# Patient Record
Sex: FEMALE | Race: ASIAN | Employment: UNEMPLOYED | URBAN - METROPOLITAN AREA
[De-identification: names, ages, dates, MRNs, and addresses within clinical notes are randomized per-mention and may not be internally consistent; named-entity substitution may affect disease eponyms.]

---

## 2017-01-13 RX ORDER — DESOGESTREL AND ETHINYL ESTRADIOL 0.15-0.03
KIT ORAL
Qty: 28 TABLET | Refills: 0 | Status: SHIPPED | OUTPATIENT
Start: 2017-01-13 | End: 2017-02-10 | Stop reason: SDUPTHER

## 2017-01-16 RX ORDER — B-COMPLEX WITH VITAMIN C
TABLET ORAL
Qty: 30 TABLET | Refills: 0 | Status: SHIPPED | OUTPATIENT
Start: 2017-01-16 | End: 2017-03-28 | Stop reason: SDUPTHER

## 2017-02-10 RX ORDER — DESOGESTREL AND ETHINYL ESTRADIOL 0.15-0.03
KIT ORAL
Qty: 28 TABLET | Refills: 0 | Status: SHIPPED | OUTPATIENT
Start: 2017-02-10 | End: 2017-03-28 | Stop reason: SDUPTHER

## 2017-03-28 ENCOUNTER — OFFICE VISIT (OUTPATIENT)
Dept: OBGYN CLINIC | Age: 37
End: 2017-03-28
Payer: MEDICARE

## 2017-03-28 VITALS
BODY MASS INDEX: 20.27 KG/M2 | HEIGHT: 63 IN | DIASTOLIC BLOOD PRESSURE: 84 MMHG | WEIGHT: 114.4 LBS | SYSTOLIC BLOOD PRESSURE: 108 MMHG

## 2017-03-28 DIAGNOSIS — Z01.419 ENCOUNTER FOR GYNECOLOGICAL EXAMINATION WITHOUT ABNORMAL FINDING: Primary | ICD-10-CM

## 2017-03-28 LAB — PAP SMEAR: NORMAL

## 2017-03-28 PROCEDURE — 99395 PREV VISIT EST AGE 18-39: CPT | Performed by: NURSE PRACTITIONER

## 2017-03-28 RX ORDER — DESOGESTREL AND ETHINYL ESTRADIOL 0.15-0.03
KIT ORAL
Qty: 28 TABLET | Refills: 12 | Status: SHIPPED | OUTPATIENT
Start: 2017-03-28 | End: 2019-05-28 | Stop reason: CLARIF

## 2017-03-28 ASSESSMENT — ENCOUNTER SYMPTOMS
ABDOMINAL PAIN: 0
COUGH: 0
SHORTNESS OF BREATH: 0
BACK PAIN: 0
CONSTIPATION: 0
ABDOMINAL DISTENTION: 0
DIARRHEA: 0

## 2017-04-04 DIAGNOSIS — Z01.419 ENCOUNTER FOR GYNECOLOGICAL EXAMINATION WITHOUT ABNORMAL FINDING: ICD-10-CM

## 2017-05-02 RX ORDER — B-COMPLEX WITH VITAMIN C
TABLET ORAL
Qty: 30 TABLET | Refills: 0 | Status: SHIPPED | OUTPATIENT
Start: 2017-05-02 | End: 2017-06-20 | Stop reason: SDUPTHER

## 2017-06-09 RX ORDER — B-COMPLEX WITH VITAMIN C
TABLET ORAL
Qty: 30 TABLET | Refills: 0 | Status: SHIPPED | OUTPATIENT
Start: 2017-06-09 | End: 2017-06-20 | Stop reason: SDUPTHER

## 2017-06-20 ENCOUNTER — OFFICE VISIT (OUTPATIENT)
Dept: FAMILY MEDICINE CLINIC | Age: 37
End: 2017-06-20
Payer: MEDICARE

## 2017-06-20 VITALS
HEIGHT: 62 IN | TEMPERATURE: 98 F | SYSTOLIC BLOOD PRESSURE: 125 MMHG | BODY MASS INDEX: 20.5 KG/M2 | HEART RATE: 86 BPM | DIASTOLIC BLOOD PRESSURE: 74 MMHG | WEIGHT: 111.4 LBS

## 2017-06-20 DIAGNOSIS — D69.6 THROMBOCYTOPENIA (HCC): ICD-10-CM

## 2017-06-20 DIAGNOSIS — R42 DIZZINESS: ICD-10-CM

## 2017-06-20 DIAGNOSIS — D50.9 IRON DEFICIENCY ANEMIA, UNSPECIFIED IRON DEFICIENCY ANEMIA TYPE: Primary | ICD-10-CM

## 2017-06-20 DIAGNOSIS — M25.541 ARTHRALGIA OF BOTH HANDS: ICD-10-CM

## 2017-06-20 DIAGNOSIS — H93.12 TINNITUS, LEFT: ICD-10-CM

## 2017-06-20 DIAGNOSIS — E55.9 VITAMIN D DEFICIENCY: ICD-10-CM

## 2017-06-20 DIAGNOSIS — R63.6 UNDERWEIGHT: ICD-10-CM

## 2017-06-20 DIAGNOSIS — M25.542 ARTHRALGIA OF BOTH HANDS: ICD-10-CM

## 2017-06-20 DIAGNOSIS — M54.9 DORSAL BACK PAIN: ICD-10-CM

## 2017-06-20 PROCEDURE — 99213 OFFICE O/P EST LOW 20 MIN: CPT | Performed by: FAMILY MEDICINE

## 2017-06-20 RX ORDER — LANOLIN ALCOHOL/MO/W.PET/CERES
325 CREAM (GRAM) TOPICAL 2 TIMES DAILY
Qty: 90 TABLET | Refills: 3 | Status: SHIPPED | OUTPATIENT
Start: 2017-06-20 | End: 2019-05-30 | Stop reason: SDUPTHER

## 2017-06-26 LAB
ALBUMIN SERPL-MCNC: 3.7 G/DL
ALP BLD-CCNC: 38 U/L
ALT SERPL-CCNC: 12 U/L
ANA TITER: NORMAL
AST SERPL-CCNC: 14 U/L
BASOPHILS ABSOLUTE: 0 /ΜL
BASOPHILS RELATIVE PERCENT: 0.4 %
BILIRUB SERPL-MCNC: 0.6 MG/DL (ref 0.1–1.4)
BUN BLDV-MCNC: 6 MG/DL
CALCIUM SERPL-MCNC: 9.1 MG/DL
CHLORIDE BLD-SCNC: 107 MMOL/L
CO2: 25 MMOL/L
CREAT SERPL-MCNC: 0.69 MG/DL
EOSINOPHILS ABSOLUTE: 0.2 /ΜL
EOSINOPHILS RELATIVE PERCENT: 3.4 %
GFR CALCULATED: 60
GLUCOSE BLD-MCNC: 79 MG/DL
HCT VFR BLD CALC: 41.3 % (ref 36–46)
HEMOGLOBIN: 14 G/DL (ref 12–16)
IRON: 65
LYMPHOCYTES ABSOLUTE: 1.7 /ΜL
LYMPHOCYTES RELATIVE PERCENT: 23.8 %
MCH RBC QN AUTO: 29.7 PG
MCHC RBC AUTO-ENTMCNC: 33.8 G/DL
MCV RBC AUTO: 88 FL
MONOCYTES ABSOLUTE: 0.4 /ΜL
MONOCYTES RELATIVE PERCENT: 6 %
NEUTROPHILS ABSOLUTE: 4.7 /ΜL
NEUTROPHILS RELATIVE PERCENT: 66.4 %
PDW BLD-RTO: 15.1 %
PLATELET # BLD: 145 K/ΜL
PMV BLD AUTO: 9.8 FL
POTASSIUM SERPL-SCNC: 4.1 MMOL/L
RBC # BLD: 4.7 10^6/ΜL
SODIUM BLD-SCNC: 138 MMOL/L
T4 FREE: 0.98
TOTAL IRON BINDING CAPACITY: 570
TOTAL PROTEIN: 6.6
TSH SERPL DL<=0.05 MIU/L-ACNC: 0.66 UIU/ML
URIC ACID: 3.6
VITAMIN D 25-HYDROXY: 29.9
VITAMIN D2, 25 HYDROXY: ABNORMAL
VITAMIN D3,25 HYDROXY: ABNORMAL
WBC # BLD: 7.1 10^3/ML

## 2017-06-29 DIAGNOSIS — M25.541 ARTHRALGIA OF BOTH HANDS: ICD-10-CM

## 2017-06-29 DIAGNOSIS — E55.9 VITAMIN D DEFICIENCY: ICD-10-CM

## 2017-06-29 DIAGNOSIS — D50.9 IRON DEFICIENCY ANEMIA, UNSPECIFIED IRON DEFICIENCY ANEMIA TYPE: ICD-10-CM

## 2017-06-29 DIAGNOSIS — R63.6 UNDERWEIGHT: ICD-10-CM

## 2017-06-29 DIAGNOSIS — M25.542 ARTHRALGIA OF BOTH HANDS: ICD-10-CM

## 2017-07-11 RX ORDER — B-COMPLEX WITH VITAMIN C
TABLET ORAL
Qty: 30 TABLET | Refills: 0 | Status: SHIPPED | OUTPATIENT
Start: 2017-07-11 | End: 2017-07-24 | Stop reason: SDUPTHER

## 2017-07-14 ASSESSMENT — ENCOUNTER SYMPTOMS
DIARRHEA: 0
TROUBLE SWALLOWING: 0
NAUSEA: 0
SORE THROAT: 0
CONSTIPATION: 0
RHINORRHEA: 0
COUGH: 0
ABDOMINAL PAIN: 0
BACK PAIN: 1
SHORTNESS OF BREATH: 0
CHEST TIGHTNESS: 0

## 2017-07-24 ENCOUNTER — OFFICE VISIT (OUTPATIENT)
Dept: FAMILY MEDICINE CLINIC | Age: 37
End: 2017-07-24
Payer: MEDICARE

## 2017-07-24 VITALS
HEIGHT: 62 IN | DIASTOLIC BLOOD PRESSURE: 89 MMHG | SYSTOLIC BLOOD PRESSURE: 123 MMHG | BODY MASS INDEX: 20.61 KG/M2 | HEART RATE: 92 BPM | WEIGHT: 112 LBS

## 2017-07-24 DIAGNOSIS — E55.9 VITAMIN D DEFICIENCY: ICD-10-CM

## 2017-07-24 DIAGNOSIS — D69.6 THROMBOCYTOPENIA (HCC): ICD-10-CM

## 2017-07-24 DIAGNOSIS — R53.83 FATIGUE, UNSPECIFIED TYPE: ICD-10-CM

## 2017-07-24 DIAGNOSIS — D50.9 IRON DEFICIENCY ANEMIA, UNSPECIFIED IRON DEFICIENCY ANEMIA TYPE: Primary | ICD-10-CM

## 2017-07-24 DIAGNOSIS — R42 DIZZINESS: ICD-10-CM

## 2017-07-24 PROCEDURE — 99213 OFFICE O/P EST LOW 20 MIN: CPT | Performed by: FAMILY MEDICINE

## 2017-07-24 RX ORDER — KETOTIFEN FUMARATE 0.25 MG/ML
SOLUTION OPHTHALMIC
COMMUNITY
Start: 2017-06-27 | End: 2020-12-22 | Stop reason: SDUPTHER

## 2017-07-24 RX ORDER — FEXOFENADINE HCL 180 MG/1
TABLET ORAL
COMMUNITY
Start: 2017-06-27 | End: 2017-09-20 | Stop reason: ALTCHOICE

## 2017-07-24 RX ORDER — FLUTICASONE PROPIONATE 50 MCG
SPRAY, SUSPENSION (ML) NASAL PRN
COMMUNITY
Start: 2017-06-27 | End: 2020-08-20

## 2017-07-24 ASSESSMENT — ENCOUNTER SYMPTOMS
NAUSEA: 0
TROUBLE SWALLOWING: 0
CONSTIPATION: 0
SHORTNESS OF BREATH: 0
DIARRHEA: 0
ABDOMINAL PAIN: 0
COUGH: 0
CHEST TIGHTNESS: 0
BACK PAIN: 1
SORE THROAT: 0
RHINORRHEA: 0

## 2017-09-11 RX ORDER — B-COMPLEX WITH VITAMIN C
TABLET ORAL
Qty: 30 TABLET | Refills: 0 | Status: SHIPPED | OUTPATIENT
Start: 2017-09-11 | End: 2017-09-20 | Stop reason: SDUPTHER

## 2017-09-20 ENCOUNTER — OFFICE VISIT (OUTPATIENT)
Dept: FAMILY MEDICINE CLINIC | Age: 37
End: 2017-09-20
Payer: MEDICARE

## 2017-09-20 VITALS
DIASTOLIC BLOOD PRESSURE: 70 MMHG | HEART RATE: 81 BPM | HEIGHT: 62 IN | TEMPERATURE: 98.1 F | BODY MASS INDEX: 20.82 KG/M2 | WEIGHT: 113.13 LBS | SYSTOLIC BLOOD PRESSURE: 117 MMHG | OXYGEN SATURATION: 99 %

## 2017-09-20 DIAGNOSIS — M54.9 DORSAL BACK PAIN: ICD-10-CM

## 2017-09-20 DIAGNOSIS — R42 DIZZINESS: Primary | ICD-10-CM

## 2017-09-20 DIAGNOSIS — R53.83 FATIGUE, UNSPECIFIED TYPE: ICD-10-CM

## 2017-09-20 DIAGNOSIS — E55.9 VITAMIN D DEFICIENCY: ICD-10-CM

## 2017-09-20 PROCEDURE — 99213 OFFICE O/P EST LOW 20 MIN: CPT | Performed by: FAMILY MEDICINE

## 2017-09-20 ASSESSMENT — ENCOUNTER SYMPTOMS
COUGH: 0
CONSTIPATION: 0
SORE THROAT: 0
TROUBLE SWALLOWING: 0
NAUSEA: 0
DIARRHEA: 0
RHINORRHEA: 0
ABDOMINAL PAIN: 0
CHEST TIGHTNESS: 0
SHORTNESS OF BREATH: 1
BACK PAIN: 1

## 2018-01-31 ENCOUNTER — OFFICE VISIT (OUTPATIENT)
Dept: FAMILY MEDICINE CLINIC | Age: 38
End: 2018-01-31
Payer: MEDICARE

## 2018-01-31 VITALS
DIASTOLIC BLOOD PRESSURE: 77 MMHG | SYSTOLIC BLOOD PRESSURE: 112 MMHG | BODY MASS INDEX: 20.87 KG/M2 | HEART RATE: 82 BPM | HEIGHT: 62 IN | WEIGHT: 113.4 LBS | OXYGEN SATURATION: 100 %

## 2018-01-31 DIAGNOSIS — K06.8 GINGIVAL BLEEDING: Primary | ICD-10-CM

## 2018-01-31 DIAGNOSIS — D69.6 THROMBOCYTOPENIA (HCC): ICD-10-CM

## 2018-01-31 DIAGNOSIS — E55.9 VITAMIN D DEFICIENCY: ICD-10-CM

## 2018-01-31 PROCEDURE — 1036F TOBACCO NON-USER: CPT | Performed by: FAMILY MEDICINE

## 2018-01-31 PROCEDURE — 99213 OFFICE O/P EST LOW 20 MIN: CPT | Performed by: FAMILY MEDICINE

## 2018-01-31 PROCEDURE — G8427 DOCREV CUR MEDS BY ELIG CLIN: HCPCS | Performed by: FAMILY MEDICINE

## 2018-01-31 PROCEDURE — G8420 CALC BMI NORM PARAMETERS: HCPCS | Performed by: FAMILY MEDICINE

## 2018-01-31 PROCEDURE — G8484 FLU IMMUNIZE NO ADMIN: HCPCS | Performed by: FAMILY MEDICINE

## 2018-01-31 ASSESSMENT — ENCOUNTER SYMPTOMS
COUGH: 0
NAUSEA: 0
RHINORRHEA: 0
CONSTIPATION: 0
SHORTNESS OF BREATH: 0
TROUBLE SWALLOWING: 0
ABDOMINAL PAIN: 0
CHEST TIGHTNESS: 0
SORE THROAT: 0
DIARRHEA: 0
BACK PAIN: 1

## 2018-01-31 NOTE — PROGRESS NOTES
Visit Information    Have you changed or started any medications since your last visit including any over-the-counter medicines, vitamins, or herbal medicines? no   Are you having any side effects from any of your medications? -  yes - Enskyce patient states some rash on her face   Have you stopped taking any of your medications? Is so, why? -  no    Have you seen any other physician or provider since your last visit? No  Have you had any other diagnostic tests since your last visit? No  Have you been seen in the emergency room and/or had an admission to a hospital since we last saw you? No  Have you had your routine dental cleaning in the past 6 months? yes - January     Have you activated your [employer] account? If not, what are your barriers?  Yes     Patient Care Team:  Ron Brownlee MD as PCP - General (Family Medicine)    Medical History Review  Past Medical, Family, and Social History reviewed and does not contribute to the patient presenting condition    Health Maintenance   Topic Date Due    DTaP/Tdap/Td vaccine (1 - Tdap) 05/14/1997    Flu vaccine (1) 09/01/2017    Cervical cancer screen  03/28/2018    HIV screen  Completed

## 2018-04-05 ENCOUNTER — HOSPITAL ENCOUNTER (OUTPATIENT)
Age: 38
Setting detail: SPECIMEN
Discharge: HOME | End: 2018-04-05
Payer: MEDICARE

## 2018-04-05 ENCOUNTER — OFFICE VISIT (OUTPATIENT)
Dept: OBGYN CLINIC | Age: 38
End: 2018-04-05
Payer: MEDICARE

## 2018-04-05 VITALS
DIASTOLIC BLOOD PRESSURE: 78 MMHG | BODY MASS INDEX: 19.77 KG/M2 | HEIGHT: 63 IN | WEIGHT: 111.6 LBS | SYSTOLIC BLOOD PRESSURE: 118 MMHG

## 2018-04-05 DIAGNOSIS — Z01.419 ENCOUNTER FOR GYNECOLOGICAL EXAMINATION WITHOUT ABNORMAL FINDING: Primary | ICD-10-CM

## 2018-04-05 DIAGNOSIS — L68.0 FEMALE HIRSUTISM: ICD-10-CM

## 2018-04-05 DIAGNOSIS — Z12.31 ENCOUNTER FOR SCREENING MAMMOGRAM FOR BREAST CANCER: ICD-10-CM

## 2018-04-05 DIAGNOSIS — N93.9 ABNORMAL UTERINE BLEEDING (AUB): ICD-10-CM

## 2018-04-05 PROCEDURE — 99395 PREV VISIT EST AGE 18-39: CPT | Performed by: NURSE PRACTITIONER

## 2018-04-05 ASSESSMENT — ENCOUNTER SYMPTOMS
COUGH: 0
DIARRHEA: 0
ABDOMINAL DISTENTION: 0
ABDOMINAL PAIN: 0
SHORTNESS OF BREATH: 0
BACK PAIN: 0
CONSTIPATION: 0

## 2018-04-09 LAB
AVERAGE GLUCOSE: 97
BASOPHILS ABSOLUTE: 0 /ΜL
BASOPHILS RELATIVE PERCENT: 0.2 %
CORTISOL: 8.4
EOSINOPHILS ABSOLUTE: 0.1 /ΜL
EOSINOPHILS RELATIVE PERCENT: 1.7 %
HBA1C MFR BLD: 5 %
HCT VFR BLD CALC: 40.1 % (ref 36–46)
HEMOGLOBIN: 13.9 G/DL (ref 12–16)
INR BLD: 1.1
LYMPHOCYTES ABSOLUTE: 2.1 /ΜL
LYMPHOCYTES RELATIVE PERCENT: 24.4 %
MCH RBC QN AUTO: 31 PG
MCHC RBC AUTO-ENTMCNC: 34.7 G/DL
MCV RBC AUTO: 89 FL
MONOCYTES ABSOLUTE: 0.5 /ΜL
MONOCYTES RELATIVE PERCENT: 6 %
NEUTROPHILS ABSOLUTE: 5.8 /ΜL
NEUTROPHILS RELATIVE PERCENT: 67.7 %
PDW BLD-RTO: 13.5 %
PLATELET # BLD: 140 K/ΜL
PMV BLD AUTO: 9.9 FL
PROTIME: 12.2 SECONDS
RBC # BLD: 4.49 10^6/ΜL
WBC # BLD: 8.6 10^3/ML

## 2018-04-10 DIAGNOSIS — L68.0 FEMALE HIRSUTISM: ICD-10-CM

## 2018-04-10 DIAGNOSIS — K06.8 GINGIVAL BLEEDING: ICD-10-CM

## 2018-04-13 ENCOUNTER — PROCEDURE VISIT (OUTPATIENT)
Dept: OBGYN CLINIC | Age: 38
End: 2018-04-13
Payer: MEDICARE

## 2018-04-13 DIAGNOSIS — N93.9 ABNORMAL UTERINE BLEEDING (AUB): ICD-10-CM

## 2018-04-13 DIAGNOSIS — L68.0 FEMALE HIRSUTISM: ICD-10-CM

## 2018-04-13 LAB
SEX HORMONE BINDING GLOBULIN: NORMAL
TESTOSTERONE FREE: 0.33
TESTOSTERONE, FEMALES/CHILDREN: 22

## 2018-04-13 PROCEDURE — 76830 TRANSVAGINAL US NON-OB: CPT | Performed by: OBSTETRICS & GYNECOLOGY

## 2018-04-13 PROCEDURE — 76856 US EXAM PELVIC COMPLETE: CPT | Performed by: OBSTETRICS & GYNECOLOGY

## 2018-04-19 ENCOUNTER — OFFICE VISIT (OUTPATIENT)
Dept: FAMILY MEDICINE CLINIC | Age: 38
End: 2018-04-19
Payer: MEDICARE

## 2018-04-19 VITALS
WEIGHT: 109.8 LBS | HEIGHT: 63 IN | DIASTOLIC BLOOD PRESSURE: 74 MMHG | BODY MASS INDEX: 19.45 KG/M2 | SYSTOLIC BLOOD PRESSURE: 114 MMHG | HEART RATE: 77 BPM

## 2018-04-19 DIAGNOSIS — E55.9 VITAMIN D DEFICIENCY: ICD-10-CM

## 2018-04-19 DIAGNOSIS — M54.9 DORSAL BACK PAIN: ICD-10-CM

## 2018-04-19 DIAGNOSIS — D69.6 THROMBOCYTOPENIA (HCC): ICD-10-CM

## 2018-04-19 DIAGNOSIS — K21.9 GASTROESOPHAGEAL REFLUX DISEASE WITHOUT ESOPHAGITIS: ICD-10-CM

## 2018-04-19 DIAGNOSIS — N93.8 DUB (DYSFUNCTIONAL UTERINE BLEEDING): Primary | ICD-10-CM

## 2018-04-19 DIAGNOSIS — D25.9 UTERINE LEIOMYOMA, UNSPECIFIED LOCATION: ICD-10-CM

## 2018-04-19 PROBLEM — H81.8X9 UNILATERAL VESTIBULAR WEAKNESS: Status: ACTIVE | Noted: 2017-06-15

## 2018-04-19 PROBLEM — H81.4 CENTRAL POSITIONAL VERTIGO: Status: ACTIVE | Noted: 2017-06-15

## 2018-04-19 PROCEDURE — G8427 DOCREV CUR MEDS BY ELIG CLIN: HCPCS | Performed by: FAMILY MEDICINE

## 2018-04-19 PROCEDURE — G8420 CALC BMI NORM PARAMETERS: HCPCS | Performed by: FAMILY MEDICINE

## 2018-04-19 PROCEDURE — 99214 OFFICE O/P EST MOD 30 MIN: CPT | Performed by: FAMILY MEDICINE

## 2018-04-19 PROCEDURE — 1036F TOBACCO NON-USER: CPT | Performed by: FAMILY MEDICINE

## 2018-04-19 RX ORDER — OMEPRAZOLE 20 MG/1
20 CAPSULE, DELAYED RELEASE ORAL DAILY
Qty: 30 CAPSULE | Refills: 3 | Status: ON HOLD | OUTPATIENT
Start: 2018-04-19 | End: 2022-05-16 | Stop reason: ALTCHOICE

## 2018-04-19 ASSESSMENT — ENCOUNTER SYMPTOMS
TROUBLE SWALLOWING: 0
BACK PAIN: 1
ABDOMINAL PAIN: 0
DIARRHEA: 0
COUGH: 1
SORE THROAT: 0
CHEST TIGHTNESS: 0
NAUSEA: 0
CONSTIPATION: 0
FACIAL SWELLING: 0
RHINORRHEA: 0
SHORTNESS OF BREATH: 0

## 2018-04-19 ASSESSMENT — PATIENT HEALTH QUESTIONNAIRE - PHQ9
1. LITTLE INTEREST OR PLEASURE IN DOING THINGS: 0
SUM OF ALL RESPONSES TO PHQ QUESTIONS 1-9: 0
SUM OF ALL RESPONSES TO PHQ9 QUESTIONS 1 & 2: 0
2. FEELING DOWN, DEPRESSED OR HOPELESS: 0

## 2018-04-20 LAB — CYTOLOGY REPORT: NORMAL

## 2018-05-09 ENCOUNTER — INITIAL CONSULT (OUTPATIENT)
Dept: OBGYN CLINIC | Age: 38
End: 2018-05-09
Payer: MEDICARE

## 2018-05-09 VITALS
SYSTOLIC BLOOD PRESSURE: 116 MMHG | DIASTOLIC BLOOD PRESSURE: 70 MMHG | HEIGHT: 62 IN | WEIGHT: 109.8 LBS | BODY MASS INDEX: 20.2 KG/M2

## 2018-05-09 DIAGNOSIS — N92.1 MENOMETRORRHAGIA: Primary | ICD-10-CM

## 2018-05-09 PROCEDURE — 1036F TOBACCO NON-USER: CPT | Performed by: OBSTETRICS & GYNECOLOGY

## 2018-05-09 PROCEDURE — G8428 CUR MEDS NOT DOCUMENT: HCPCS | Performed by: OBSTETRICS & GYNECOLOGY

## 2018-05-09 PROCEDURE — 99213 OFFICE O/P EST LOW 20 MIN: CPT | Performed by: OBSTETRICS & GYNECOLOGY

## 2018-05-09 PROCEDURE — G8420 CALC BMI NORM PARAMETERS: HCPCS | Performed by: OBSTETRICS & GYNECOLOGY

## 2018-06-11 ENCOUNTER — OFFICE VISIT (OUTPATIENT)
Dept: OBGYN CLINIC | Age: 38
End: 2018-06-11
Payer: MEDICARE

## 2018-06-11 VITALS
BODY MASS INDEX: 20.17 KG/M2 | WEIGHT: 109.6 LBS | DIASTOLIC BLOOD PRESSURE: 70 MMHG | HEIGHT: 62 IN | SYSTOLIC BLOOD PRESSURE: 114 MMHG

## 2018-06-11 DIAGNOSIS — N94.6 MENORRHALGIA: Primary | ICD-10-CM

## 2018-06-11 DIAGNOSIS — D25.0 SUBMUCOUS LEIOMYOMA OF UTERUS: ICD-10-CM

## 2018-06-11 DIAGNOSIS — N84.0 UTERINE POLYP: ICD-10-CM

## 2018-06-11 PROCEDURE — G8420 CALC BMI NORM PARAMETERS: HCPCS | Performed by: OBSTETRICS & GYNECOLOGY

## 2018-06-11 PROCEDURE — G8428 CUR MEDS NOT DOCUMENT: HCPCS | Performed by: OBSTETRICS & GYNECOLOGY

## 2018-06-11 PROCEDURE — 1036F TOBACCO NON-USER: CPT | Performed by: OBSTETRICS & GYNECOLOGY

## 2018-06-11 PROCEDURE — 99213 OFFICE O/P EST LOW 20 MIN: CPT | Performed by: OBSTETRICS & GYNECOLOGY

## 2018-07-31 ENCOUNTER — OFFICE VISIT (OUTPATIENT)
Dept: FAMILY MEDICINE CLINIC | Age: 38
End: 2018-07-31
Payer: MEDICARE

## 2018-07-31 VITALS
SYSTOLIC BLOOD PRESSURE: 109 MMHG | BODY MASS INDEX: 20.13 KG/M2 | RESPIRATION RATE: 16 BRPM | WEIGHT: 109.4 LBS | HEART RATE: 85 BPM | DIASTOLIC BLOOD PRESSURE: 69 MMHG | HEIGHT: 62 IN | OXYGEN SATURATION: 99 %

## 2018-07-31 DIAGNOSIS — E55.9 VITAMIN D DEFICIENCY: ICD-10-CM

## 2018-07-31 DIAGNOSIS — N93.8 DUB (DYSFUNCTIONAL UTERINE BLEEDING): ICD-10-CM

## 2018-07-31 DIAGNOSIS — M54.9 DORSAL BACK PAIN: Primary | ICD-10-CM

## 2018-07-31 DIAGNOSIS — D50.9 IRON DEFICIENCY ANEMIA, UNSPECIFIED IRON DEFICIENCY ANEMIA TYPE: ICD-10-CM

## 2018-07-31 DIAGNOSIS — D69.6 THROMBOCYTOPENIA (HCC): ICD-10-CM

## 2018-07-31 PROCEDURE — G8427 DOCREV CUR MEDS BY ELIG CLIN: HCPCS | Performed by: FAMILY MEDICINE

## 2018-07-31 PROCEDURE — 1036F TOBACCO NON-USER: CPT | Performed by: FAMILY MEDICINE

## 2018-07-31 PROCEDURE — 99213 OFFICE O/P EST LOW 20 MIN: CPT | Performed by: FAMILY MEDICINE

## 2018-07-31 PROCEDURE — G8420 CALC BMI NORM PARAMETERS: HCPCS | Performed by: FAMILY MEDICINE

## 2018-07-31 ASSESSMENT — ENCOUNTER SYMPTOMS
BACK PAIN: 0
ABDOMINAL PAIN: 0
COUGH: 0
SHORTNESS OF BREATH: 0
TROUBLE SWALLOWING: 0
CHEST TIGHTNESS: 0
DIARRHEA: 0
RHINORRHEA: 0
NAUSEA: 0
SORE THROAT: 0
CONSTIPATION: 0

## 2018-07-31 NOTE — PROGRESS NOTES
Visit Information    Have you changed or started any medications since your last visit including any over-the-counter medicines, vitamins, or herbal medicines? no   Have you stopped taking any of your medications? Is so, why? -  yes - birth control meds   Are you having any side effects from any of your medications? - yes - she felt she was with bc and that's why she stopped     Have you seen any other physician or provider since your last visit? yes - obgyn    Have you had any other diagnostic tests since your last visit?  no   Have you been seen in the emergency room and/or had an admission in a hospital since we last saw you?  no   Have you had your routine dental cleaning in the past 6 months?  no     Do you have an active MyChart account? If no, what is the barrier?   Yes    Patient Care Team:  Baldev Badillo MD as PCP - General (Family Medicine)  Baldev Badillo MD as PCP - [facility] Attributed Provider    Medical History Review  Past Medical, Family, and Social History reviewed and does contribute to the patient presenting condition    Health Maintenance   Topic Date Due    Lipid screen  05/14/2018    DTaP/Tdap/Td vaccine (1 - Tdap) 04/19/2019 (Originally 5/14/1997)    Flu vaccine (1) 09/01/2018    Cervical cancer screen  04/05/2019    HIV screen  Completed

## 2018-07-31 NOTE — PROGRESS NOTES
rhinorrhea, sore throat and trouble swallowing. Eyes: Negative for visual disturbance. Respiratory: Negative for cough, chest tightness and shortness of breath. Cardiovascular: Negative for chest pain, palpitations and leg swelling. Gastrointestinal: Negative for abdominal pain, constipation, diarrhea and nausea. Endocrine: Negative for polydipsia and polyuria. Genitourinary: Negative for difficulty urinating, dysuria and hematuria. Musculoskeletal: Negative for arthralgias, back pain, gait problem and neck pain. Skin: Negative for rash. Neurological: Negative for dizziness, light-headedness and headaches. Psychiatric/Behavioral: Negative for dysphoric mood and sleep disturbance. The patient is not nervous/anxious. Objective:     Physical Exam   Constitutional: She is oriented to person, place, and time. Vital signs are normal. She appears well-developed and well-nourished. HENT:   Head: Normocephalic. Nose: Nose normal.   Mouth/Throat: Oropharynx is clear and moist.   Eyes: Pupils are equal, round, and reactive to light. No scleral icterus. Neck: Normal range of motion. Neck supple. No JVD present. Neck shows no nodes or bruits. Thyroid is not palpable. Cardiovascular: Normal rate, regular rhythm, normal heart sounds and normal pulses. Exam reveals no gallop. No murmur heard. Pulmonary/Chest: Effort normal and breath sounds normal. She has no wheezes. She has no rales. Abdominal: Soft. Bowel sounds are normal. She exhibits no mass. There is no tenderness. Liver and spleen are not palpable. Musculoskeletal:   Legs show no edema. She moves all her limbs well. Feet show no tenderness. They're warm to touch. Pulses are palpable. She is able to stand up straight. There is mild tenderness over the trapezius muscles and dorsal muscles. No back deformity noted. No tremors. Gait is stable. Lymphadenopathy:     She has no cervical adenopathy.    Neurological: She is alert and oriented to person, place, and time. Coordination normal.   Skin: Skin is warm and dry. No rash noted. Psychiatric: She has a normal mood and affect. Her behavior is normal. Thought content normal.   Nursing note and vitals reviewed. /69 (Site: Left Arm, Position: Sitting, Cuff Size: Medium Adult)   Pulse 85   Resp 16   Ht 5' 2\" (1.575 m)   Wt 109 lb 6.4 oz (49.6 kg)   SpO2 99%   BMI 20.01 kg/m²     Assessment:       Diagnosis Orders   1. Dorsal back pain     2. Vitamin D deficiency  Comprehensive Metabolic Panel    Vitamin D 1,25 Dihydroxy    Vitamin D 25 Hydroxy   3. Thrombocytopenia (HCC)  CBC Auto Differential    Comprehensive Metabolic Panel   4. DUB (dysfunctional uterine bleeding)     5. Iron deficiency anemia, unspecified iron deficiency anemia type  Iron and TIBC       Plan:      Return in about 3 months (around 10/31/2018). Orders Placed This Encounter   Procedures    CBC Auto Differential     Standing Status:   Future     Standing Expiration Date:   7/31/2019    Comprehensive Metabolic Panel     Standing Status:   Future     Standing Expiration Date:   7/31/2019    Iron and TIBC     Standing Status:   Future     Standing Expiration Date:   7/31/2019     Order Specific Question:   Is Patient Fasting? Answer:   yes     Order Specific Question:   No of Hours? Answer:   8    Vitamin D 1,25 Dihydroxy     Standing Status:   Future     Standing Expiration Date:   7/31/2019    Vitamin D 25 Hydroxy     Standing Status:   Future     Standing Expiration Date:   7/31/2019     No orders of the defined types were placed in this encounter. Findings and/or pathophysiology discussed with patient. Plan of treatment discussed. Patient's medical problems were discussed with her. Findings were explained. Chart was evaluated. Available lab work and tests results were discussed. Her medications were reviewed. Health maintenance was discussed.   Weight management was

## 2019-05-28 ENCOUNTER — OFFICE VISIT (OUTPATIENT)
Dept: OBGYN CLINIC | Age: 39
End: 2019-05-28
Payer: MEDICARE

## 2019-05-28 ENCOUNTER — HOSPITAL ENCOUNTER (OUTPATIENT)
Age: 39
Setting detail: SPECIMEN
Discharge: HOME | End: 2019-05-28
Payer: MEDICARE

## 2019-05-28 VITALS
BODY MASS INDEX: 18.5 KG/M2 | DIASTOLIC BLOOD PRESSURE: 66 MMHG | SYSTOLIC BLOOD PRESSURE: 100 MMHG | WEIGHT: 104.4 LBS | HEIGHT: 63 IN

## 2019-05-28 DIAGNOSIS — Z01.419 ENCOUNTER FOR GYNECOLOGICAL EXAMINATION WITH PAPANICOLAOU SMEAR OF CERVIX: ICD-10-CM

## 2019-05-28 DIAGNOSIS — N92.1 MENORRHAGIA WITH IRREGULAR CYCLE: ICD-10-CM

## 2019-05-28 DIAGNOSIS — Z00.00 ENCOUNTER FOR WELL WOMAN EXAM WITHOUT GYNECOLOGICAL EXAM: Primary | ICD-10-CM

## 2019-05-28 PROCEDURE — 99396 PREV VISIT EST AGE 40-64: CPT | Performed by: NURSE PRACTITIONER

## 2019-05-28 ASSESSMENT — ENCOUNTER SYMPTOMS
ABDOMINAL DISTENTION: 0
BACK PAIN: 0
COUGH: 0
CONSTIPATION: 0
ABDOMINAL PAIN: 0
DIARRHEA: 0
SHORTNESS OF BREATH: 0

## 2019-05-28 NOTE — PROGRESS NOTES
HPI:     Coleman Vera a 37 y.o. female who presents today for:  Chief Complaint   Patient presents with    Annual Exam     presents for annual exam, last pap 04/05/18 normal, no mamm's    Vaginal Bleeding     episodes of bleeding twice per month    Bleeding/Bruising     bruising and unexplained weight lose       HPI  Here for annual exam.  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 Has 3 children- 15/10/7. Eats very healthy, working on adding exercise. GYNECOLOGICHISTORY:  MenstrualHistory: Patient's last menstrual period was 05/01/2019. Sexually Transmitted Infections: No  History of Ectopic Pregnancy:No  Menarche: 13  Cycles states tat she is having a period twice a month  Had US last year- fibroid noted and discussed with Dr Phil Chin. He recommended hysteroscopy and she did not pursue at that time  Durationof cycles: 2/7  Dysmenorrhea: mild  Sexually active: not currently  Dyspareunia:    Current Outpatient Medications   Medication Sig Dispense Refill    omeprazole (PRILOSEC) 20 MG delayed release capsule Take 1 capsule by mouth daily 30 capsule 3    EQ EYE ITCH RELIEF 0.025 % ophthalmic solution       fluticasone (FLONASE) 50 MCG/ACT nasal spray       ferrous sulfate (FE TABS) 325 (65 Fe) MG EC tablet Take 1 tablet by mouth 2 times daily (Patient taking differently: Take 325 mg by mouth daily as needed ) 90 tablet 3    Calcium Carbonate-Vitamin D (OYSTER SHELL CALCIUM/D) 500-200 MG-UNIT TABS TAKE ONE TABLET BY MOUTH ONCE DAILY 30 tablet 0    ibuprofen (ADVIL;MOTRIN) 400 MG tablet Take 1 tablet by mouth every 8 hours as needed for Pain 90 tablet 1    fluocinolone acetonide (SYNALAR) 0.01 % external solution        No current facility-administered medications for this visit.       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[facility])      Denies family history of breast, ovarian, uterus, colon CA     Past Surgical History:   Procedure Laterality Date     SECTION, LOW TRANSVERSE  2004     SECTION, LOW TRANSVERSE  2008     SECTION, LOW TRANSVERSE  10/2012    TUBAL LIGATION  10/8/12     Family History   Problem Relation Age of Onset    High Blood Pressure Mother     High Blood Pressure Father      Social History     Tobacco Use    Smoking status: Never Smoker    Smokeless tobacco: Never Used   Substance Use Topics    Alcohol use: No        Subjective:      Review of Systems   Constitutional: Positive for unexpected weight change (weight loss). Negative for appetite change and fatigue. HENT: Negative for congestion and hearing loss. Eyes: Negative for visual disturbance. Respiratory: Negative for cough and shortness of breath. Cardiovascular: Negative for chest pain and palpitations. Gastrointestinal: Negative for abdominal distention, abdominal pain, constipation and diarrhea. Genitourinary: Positive for menstrual problem (menorrhagia). Negative for flank pain, frequency, pelvic pain and vaginal discharge. Musculoskeletal: Negative for back pain. Neurological: Negative for syncope and headaches. Hematological: Bruises/bleeds easily. Psychiatric/Behavioral: Negative for behavioral problems. Objective:     /66   Ht 5' 2.75\" (1.594 m)   Wt 104 lb 6.4 oz (47.4 kg)   LMP 2019 Comment: twice per month  Breastfeeding? No   BMI 18.64 kg/m²   Physical Exam   Constitutional: She is oriented to person, place, and time. She appears well-developed and well-nourished. Eyes: Pupils are equal, round, and reactive to light. Neck: No tracheal deviation present. No thyromegaly present. Cardiovascular: Normal rate, regular rhythm and normal heart sounds. Pulmonary/Chest: Effort normal and breath sounds normal. No respiratory distress. Right breast exhibits no inverted nipple. Left breast exhibits no inverted nipple, no mass, no nipple discharge, no skin change and no tenderness.  No breast tenderness, discharge or bleeding. Breasts are symmetrical.   Abdominal: Soft. Bowel sounds are normal. She exhibits no distension and no mass. There is no tenderness. There is no CVA tenderness. Hernia confirmed negative in the right inguinal area and confirmed negative in the left inguinal area. Genitourinary: No breast tenderness, discharge or bleeding. There is no rash or lesion on the right labia. There is no rash or lesion on the left labia. Uterus is not deviated, not enlarged and not fixed. Cervix exhibits no motion tenderness, no discharge and no friability. Right adnexum displays no mass, no tenderness and no fullness. Left adnexum displays no mass, no tenderness and no fullness. No tenderness in the vagina. No vaginal discharge found. Musculoskeletal: She exhibits no edema or tenderness. Lymphadenopathy:     She has no cervical adenopathy. Right: No inguinal adenopathy present. Left: No inguinal adenopathy present. Neurological: She is alert and oriented to person, place, and time. Skin: Skin is warm and dry. Psychiatric: She has a normal mood and affect. Her behavior is normal. Judgment and thought content normal.         Assessment:     1. Encounter for well woman exam without gynecological exam    2. Menorrhagia with irregular cycle          Plan:   1. Pap collected. Discussed new pap smear guidelines. Desires re-pap in 1 year. 2. Screening mammogram discussed and advised yearly if within normal limits. 3. Calcium and Vitamin D dosing reviewed. 4. Colonoscopy screening reviewed. 5. Bone density testing reviewed. 6. Pelvic US  7.  Advised FU w/PCP for well exam and bruising problems, unintended weight loss  Electronicallysigned by Christina Espana on 5/28/2019

## 2019-05-30 DIAGNOSIS — D50.9 IRON DEFICIENCY ANEMIA, UNSPECIFIED IRON DEFICIENCY ANEMIA TYPE: ICD-10-CM

## 2019-05-30 NOTE — TELEPHONE ENCOUNTER
Health Maintenance   Topic Date Due    DTaP/Tdap/Td vaccine (1 - Tdap) 05/14/1997    Lipid screen  05/14/2018    Cervical cancer screen  04/05/2019    Flu vaccine (Season Ended) 09/01/2019    HIV screen  Completed    Pneumococcal 0-64 years Vaccine  Aged Out             (applicable per patient's age: Cancer Screenings, Depression Screening, Fall Risk Screening, Immunizations)    Hemoglobin A1C (%)   Date Value   04/09/2018 5.0     AST (U/L)   Date Value   06/26/2017 14     ALT (U/L)   Date Value   06/26/2017 12     BUN (mg/dL)   Date Value   06/26/2017 6      (goal A1C is < 7)   (goal LDL is <100) need 30-50% reduction from baseline     BP Readings from Last 3 Encounters:   05/28/19 100/66   07/31/18 109/69   06/11/18 114/70    (goal /80)      All Future Testing planned in [facility]:  Lab Frequency Next Occurrence   CBC Auto Differential Once 07/31/2019   Comprehensive Metabolic Panel Once 70/06/2080   Iron and TIBC Once 07/31/2019   Vitamin D 1,25 Dihydroxy Once 06/01/2019   Vitamin D 25 Hydroxy Once 06/01/2019   US PELVIS LIMITED Once 05/28/2019   PAP Smear Once 05/28/2019       Next Visit Date:  Future Appointments   Date Time Provider Shaila Mosquera   6/4/2019  9:15 AM SCHEDULE, Pamela Perez ULTRASOUND [facility]   6/7/2019 10:20 AM STEVENSON Herrera V WALK IN [facility]            Patient Active Problem List:     Other specified fetal and placental problems affecting management of mother, antepartum     Suspected placental problem not found     Dorsal back pain     DUB (dysfunctional uterine bleeding)     Vitamin D deficiency     Thrombocytopenia (HCC)     Underweight     Anemia     Allergic conjunctivitis     Decreased libido     Fatigue     Menorrhagia     Dizziness     Allergic conjunctivitis of both eyes     Iron deficiency anemia     Arthralgia of both hands     Central positional vertigo     Unilateral vestibular weakness     Uterine leiomyoma

## 2019-05-31 RX ORDER — LANOLIN ALCOHOL/MO/W.PET/CERES
325 CREAM (GRAM) TOPICAL 2 TIMES DAILY
Qty: 90 TABLET | Refills: 0 | Status: SHIPPED | OUTPATIENT
Start: 2019-05-31 | End: 2019-06-20 | Stop reason: DRUGHIGH

## 2019-06-03 RX ORDER — B-COMPLEX WITH VITAMIN C
TABLET ORAL
Qty: 30 TABLET | Refills: 1 | Status: SHIPPED | OUTPATIENT
Start: 2019-06-03 | End: 2021-01-26

## 2019-06-03 NOTE — TELEPHONE ENCOUNTER
Health Maintenance   Topic Date Due    DTaP/Tdap/Td vaccine (1 - Tdap) 05/14/1997    Lipid screen  05/14/2018    Cervical cancer screen  04/05/2019    Flu vaccine (Season Ended) 09/01/2019    HIV screen  Completed    Pneumococcal 0-64 years Vaccine  Aged Out             (applicable per patient's age: Cancer Screenings, Depression Screening, Fall Risk Screening, Immunizations)    Hemoglobin A1C (%)   Date Value   04/09/2018 5.0     AST (U/L)   Date Value   06/26/2017 14     ALT (U/L)   Date Value   06/26/2017 12     BUN (mg/dL)   Date Value   06/26/2017 6      (goal A1C is < 7)   (goal LDL is <100) need 30-50% reduction from baseline     BP Readings from Last 3 Encounters:   05/28/19 100/66   07/31/18 109/69   06/11/18 114/70    (goal /80)      All Future Testing planned in [facility]:  Lab Frequency Next Occurrence   CBC Auto Differential Once 07/31/2019   Comprehensive Metabolic Panel Once 34/35/3480   Iron and TIBC Once 07/31/2019   Vitamin D 1,25 Dihydroxy Once 06/01/2019   Vitamin D 25 Hydroxy Once 06/01/2019   US PELVIS LIMITED Once 05/28/2019   PAP Smear Once 05/28/2019       Next Visit Date:  Future Appointments   Date Time Provider Shaila Mosquera   6/4/2019  9:15 AM SCHEDULE, Jared Yusuf [facility]   6/7/2019 10:20 AM STEVENSON Forrester V WALK IN [facility]            Patient Active Problem List:     Other specified fetal and placental problems affecting management of mother, antepartum     Suspected placental problem not found     Dorsal back pain     DUB (dysfunctional uterine bleeding)     Vitamin D deficiency     Thrombocytopenia (HCC)     Underweight     Anemia     Allergic conjunctivitis     Decreased libido     Fatigue     Menorrhagia     Dizziness     Allergic conjunctivitis of both eyes     Iron deficiency anemia     Arthralgia of both hands     Central positional vertigo     Unilateral vestibular weakness     Uterine leiomyoma

## 2019-06-04 ENCOUNTER — PROCEDURE VISIT (OUTPATIENT)
Dept: OBGYN CLINIC | Age: 39
End: 2019-06-04
Payer: MEDICARE

## 2019-06-04 DIAGNOSIS — D25.1 FIBROIDS, INTRAMURAL: ICD-10-CM

## 2019-06-04 DIAGNOSIS — N92.0 MENORRHAGIA WITH REGULAR CYCLE: Primary | ICD-10-CM

## 2019-06-04 PROCEDURE — 76830 TRANSVAGINAL US NON-OB: CPT | Performed by: OBSTETRICS & GYNECOLOGY

## 2019-06-04 PROCEDURE — 76856 US EXAM PELVIC COMPLETE: CPT | Performed by: OBSTETRICS & GYNECOLOGY

## 2019-06-06 LAB — CYTOLOGY REPORT: NORMAL

## 2019-06-07 ENCOUNTER — OFFICE VISIT (OUTPATIENT)
Dept: PRIMARY CARE CLINIC | Age: 39
End: 2019-06-07
Payer: MEDICARE

## 2019-06-07 VITALS
WEIGHT: 105 LBS | OXYGEN SATURATION: 98 % | HEART RATE: 75 BPM | HEIGHT: 63 IN | DIASTOLIC BLOOD PRESSURE: 79 MMHG | BODY MASS INDEX: 18.61 KG/M2 | RESPIRATION RATE: 20 BRPM | SYSTOLIC BLOOD PRESSURE: 125 MMHG | TEMPERATURE: 97.9 F

## 2019-06-07 DIAGNOSIS — Z76.89 REFERRED BY PRIMARY CARE PHYSICIAN: ICD-10-CM

## 2019-06-07 DIAGNOSIS — R63.6 UNDERWEIGHT: ICD-10-CM

## 2019-06-07 DIAGNOSIS — Z00.00 ANNUAL PHYSICAL EXAM: ICD-10-CM

## 2019-06-07 DIAGNOSIS — E55.9 VITAMIN D DEFICIENCY: ICD-10-CM

## 2019-06-07 DIAGNOSIS — Z00.00 HEALTHCARE MAINTENANCE: ICD-10-CM

## 2019-06-07 DIAGNOSIS — T14.8XXA BRUISING: Primary | ICD-10-CM

## 2019-06-07 DIAGNOSIS — D69.6 THROMBOCYTOPENIA (HCC): ICD-10-CM

## 2019-06-07 PROCEDURE — 99396 PREV VISIT EST AGE 40-64: CPT | Performed by: PHYSICIAN ASSISTANT

## 2019-06-07 ASSESSMENT — PATIENT HEALTH QUESTIONNAIRE - PHQ9
SUM OF ALL RESPONSES TO PHQ9 QUESTIONS 1 & 2: 0
2. FEELING DOWN, DEPRESSED OR HOPELESS: 0
SUM OF ALL RESPONSES TO PHQ QUESTIONS 1-9: 0
1. LITTLE INTEREST OR PLEASURE IN DOING THINGS: 0
SUM OF ALL RESPONSES TO PHQ QUESTIONS 1-9: 0

## 2019-06-07 NOTE — PROGRESS NOTES
[facility]  [address] [facility]  [address]  Dept: [phone]  Dept Fax: [phone]    Office Progress/Follow Up Note  Date of patient's visit: 6/7/2019  Patient's Name:  Maurice Bridges YOB: 1980            Patient Care Team:  Natalie Canavan, PA-C as PCP - General  Natalie Canavan, PA-C as PCP - [facility] Empaneled Provider  ================================================================    REASON FOR VISIT/CHIEF COMPLAINT:  Bleeding/Bruising (states she has a low platelet count); Chest Pain (left side under arm pit); and Fatigue    HISTORY OF PRESENTING ILLNESS:  History was obtained from: patient. Maurice Bridges is a 37 y.o. is here for physical, complaining of bruising, leg pain. Patient states she is compliant with medications. Pt states that she has noticed bruising \" over the last year\", has history of thrombocytopenia. Discussed bruising, thrombocytopenia in depth with patient, informed patient we will evaluate with lab work. Patient states bilateral leg pain with back pain. Upon physical examination myalgia due to muscle strain. Discussed muscle strain, treatments in depth with patient, informed patient she will be given stretches for muscle strain in bilateral legs and back, if no improvement patient will be referred to PT. Patient is requesting referral to PCP closer to her home, referral given. Patient blood pressure stable in office. Patient weight is stable. Labs reviewed, instructed patient to get lab done before seeing new PCP. Health maintenance reviewed, instructed patient to follow-up with new PCP for health maintenance concerns. Medication reviewed. Fatigue    This is a recurrent problem. Associated symptoms include fatigue, myalgias and numbness.  Pertinent negatives include no abdominal pain, arthralgias, chest pain, chills, congestion, coughing, fever, headaches, nausea, sore throat or tobacco: Never Used   Substance Use Topics    Alcohol use: No    Drug use: No       Family History   Problem Relation Age of Onset    High Blood Pressure Mother     High Blood Pressure Father         REVIEW OFSYSTEMS:  Review of Systems   Constitutional: Positive for fatigue. Negative for chills and fever. HENT: Negative for congestion, hearing loss, rhinorrhea and sore throat. Eyes: Negative for pain and visual disturbance. Respiratory: Negative for cough and shortness of breath. Cardiovascular: Negative for chest pain. Gastrointestinal: Negative for abdominal pain, constipation, diarrhea, nausea and vomiting. Genitourinary: Negative for difficulty urinating, dysuria, frequency and urgency. Musculoskeletal: Positive for myalgias. Negative for arthralgias and back pain. Neurological: Positive for dizziness, tingling, light-headedness and numbness. Negative for headaches. Hematological: Bruises/bleeds easily. PHYSICAL EXAM:  Vitals:    06/07/19 1052   BP: 125/79   Site: Left Upper Arm   Position: Sitting   Cuff Size: Medium Adult   Pulse: 75   Resp: 20   Temp: 97.9 °F (36.6 °C)   TempSrc: Oral   SpO2: 98%   Weight: 105 lb (47.6 kg)   Height: 5' 2.75\" (1.594 m)     BP Readings from Last 3 Encounters:   06/07/19 125/79   05/28/19 100/66   07/31/18 109/69        Physical Exam   Constitutional: She is oriented to person, place, and time. Vital signs are normal. She appears well-developed and well-nourished. She is cooperative. HENT:   Head: Normocephalic and atraumatic. Mouth/Throat: Oropharynx is clear and moist.   Eyes: Pupils are equal, round, and reactive to light. Conjunctivae are normal.   Neck: Normal range of motion. Cardiovascular: Normal rate, regular rhythm and normal heart sounds. Pulmonary/Chest: Effort normal and breath sounds normal.   Abdominal: Soft. She exhibits no mass. There is no tenderness. Musculoskeletal: Normal range of motion.  She exhibits no edema or

## 2019-06-07 NOTE — PROGRESS NOTES
Visit Information    Have you changed or started any medications since your last visit including any over-the-counter medicines, vitamins, or herbal medicines? no   Have you stopped taking any of your medications? Is so, why? -  no  Are you having any side effects from any of your medications? - no    Have you seen any other physician or provider since your last visit? yes - dr. Morales Members   Have you had any other diagnostic tests since your last visit?  no   Have you been seen in the emergency room and/or had an admission in a hospital since we last saw you?  no   Have you had your routine dental cleaning in the past 6 months?  no     Do you have an active MyChart account? If no, what is the barrier?   Yes    Patient Care Team:  Jenna De Jesus PA-C as PCP - General  Jenna De Jesus PA-C as PCP - [facility] Provider    Medical History Review  Past Medical, Family, and Social History reviewed and does not contribute to the patient presenting condition    Health Maintenance   Topic Date Due    DTaP/Tdap/Td vaccine (1 - Tdap) 05/14/1997    Lipid screen  05/14/2018    Flu vaccine (Season Ended) 09/01/2019    Cervical cancer screen  05/28/2020    HIV screen  Completed    Pneumococcal 0-64 years Vaccine  Aged Out

## 2019-06-07 NOTE — PATIENT INSTRUCTIONS
Patient Education        Calf Strain: Rehab Exercises  Your Care Instructions  Here are some examples of typical rehabilitation exercises for your condition. Start each exercise slowly. Ease off the exercise if you start to have pain. Your doctor or physical therapist will tell you when you can start these exercises and which ones will work best for you. How to do the exercises  Calf wall stretch (back knee straight)    1. Stand facing a wall with your hands on the wall at about eye level. Put your affected leg about a step behind your other leg. 2. Keeping your back leg straight and your back heel on the floor, bend your front knee and gently bring your hip and chest toward the wall until you feel a stretch in the calf of your back leg. 3. Hold the stretch for at least 15 to 30 seconds. 4. Repeat 2 to 4 times. Calf wall stretch (knees bent)    1. Stand facing a wall with your hands on the wall at about eye level. Put your affected leg about a step behind your other leg. 2. Keeping both heels on the floor, bend both knees. Then gently bring your hip and chest toward the wall until you feel a stretch in the calf of your back leg. 3. Hold the stretch for at least 15 to 30 seconds. 4. Repeat 2 to 4 times. Bilateral calf stretch (knees straight)    1. Place a book on the floor a few inches from a wall or countertop, and put the balls of your feet on it. Your heels should be on the floor. The book needs to be thick enough so that you can feel a gentle stretch in each calf. If you are not steady on your feet, hold on to a chair, counter, or wall while you do this stretch. 2. Keep your knees straight, and lean forward until you feel a stretch in each calf. 3. To get more stretch, add another book or use a thicker book, such as a phone book, a dictionary, or an encyclopedia. 4. Hold the stretch for at least 15 to 30 seconds. 5. Repeat 2 to 4 times. Bilateral calf stretch (knees bent)    1.  Place a book on the floor a few inches from a wall or countertop, and put the balls of your feet on it. Your heels should be on the floor. The book needs to be thick enough so that you can feel a gentle stretch in each calf. If you are not steady on your feet, hold on to a chair, counter, or wall while you do this stretch. 2. Bend your knees, and lean forward until you feel a stretch in each calf. 3. To get more stretch, add another book or use a thicker book, such as a phone book, a dictionary, or an encyclopedia. 4. Hold the stretch for at least 15 to 30 seconds. 5. Repeat 2 to 4 times. Ankle plantarflexion    1. Sit with your affected leg straight and supported on the floor. Your other leg should be bent, with that foot flat on the floor. 2. Keeping your affected leg straight, gently flex your foot downward so your toes are pointed away from your body. Then slowly relax your foot to the starting position. 3. Repeat 8 to 12 times. Ankle dorsiflexion    1. Sit with your affected leg straight and supported on the floor. Your other leg should be bent, with that foot flat on the floor. 2. Keeping your leg straight, gently flex your foot back so your toes point upward. Then slowly relax your foot to the starting position. 3. Repeat 8 to 12 times. Bilateral heel raises on step    1. Stand on the bottom step of a staircase, facing up toward the stairs. Put the balls of your feet on the step. If you are not steady on your feet, hold on to the banister or wall. 2. Keeping both knees straight, slowly lift your heels above the step so that you are standing on your toes. Then slowly lower your heels below the step and toward the floor. 3. Return to the starting position, with your feet even with the step. 4. Repeat 8 to 12 times. Follow-up care is a key part of your treatment and safety. Be sure to make and go to all appointments, and call your doctor if you are having problems.  It's also a good idea to know your test results and keep a list of the medicines you take. Where can you learn more? Go to https://[email or web address].[email or web address] org and sign in to your [employer] account. Enter J384 in the [facility] box to learn more about \"Calf Strain: Rehab Exercises. \"     If you do not have an account, please click on the \"Sign Up Now\" link. Current as of: September 20, 2018  Content Version: 12.0  © 3880-9934 Healthwise, Incorporated. Care instructions adapted under license by [facility] ([facility]). If you have questions about a medical condition or this instruction, always ask your healthcare professional. [address] any warranty or liability for your use of this information. Patient Education        Back Stretches: Exercises  Your Care Instructions  Here are some examples of exercises for stretching your back. Start each exercise slowly. Ease off the exercise if you start to have pain. Your doctor or physical therapist will tell you when you can start these exercises and which ones will work best for you. How to do the exercises  Overhead stretch    5. Stand comfortably with your feet shoulder-width apart. 6. Looking straight ahead, raise both arms over your head and reach toward the ceiling. Do not allow your head to tilt back. 7. Hold for 15 to 30 seconds, then lower your arms to your sides. 8. Repeat 2 to 4 times. Side stretch    5. Stand comfortably with your feet shoulder-width apart. 6. Raise one arm over your head, and then lean to the other side. 7. Slide your hand down your leg as you let the weight of your arm gently stretch your side muscles. Hold for 15 to 30 seconds. 8. Repeat 2 to 4 times on each side. Press-up    6. Lie on your stomach, supporting your body with your forearms. 7. Press your elbows down into the floor to raise your upper back. As you do this, relax your stomach muscles and allow your back to arch without using your back muscles.  As your press up, do not let your hips or pelvis come off the floor. 8. Hold for 15 to 30 seconds, then relax. 9. Repeat 2 to 4 times. Relax and rest    6. Lie on your back with a rolled towel under your neck and a pillow under your knees. Extend your arms comfortably to your sides. 7. Relax and breathe normally. 8. Remain in this position for about 10 minutes. 9. If you can, do this 2 or 3 times each day. Follow-up care is a key part of your treatment and safety. Be sure to make and go to all appointments, and call your doctor if you are having problems. It's also a good idea to know your test results and keep a list of the medicines you take. Where can you learn more? Go to https://TheraVid.GetShopApp. org and sign in to your [employer] account. Enter Y648 in the [employer] box to learn more about \"Back Stretches: Exercises. \"     If you do not have an account, please click on the \"Sign Up Now\" link. Current as of: September 20, 2018  Content Version: 12.0  © 7984-9468 Healthwise, Incorporated. Care instructions adapted under license by [facility] ([facility]). If you have questions about a medical condition or this instruction, always ask your healthcare professional. [address] any warranty or liability for your use of this information. Patient Education        Healthy Upper Back: Exercises  Your Care Instructions  Here are some examples of exercises for your upper back. Start each exercise slowly. Ease off the exercise if you start to have pain. Your doctor or physical therapist will tell you when you can start these exercises and which ones will work best for you. How to do the exercises  Lower neck and upper back stretch    9. Stretch your arms out in front of your body. Clasp one hand on top of your other hand. 10. Gently reach out so that you feel your shoulder blades stretching away from each other. 11. Gently bend your head forward.   12. Hold for 15 to 30 seconds. 13. Repeat 2 to 4 times. Midback stretch    9. Kneel on the floor, and sit back on your ankles. 10. Lean forward, place your hands on the floor, and stretch your arms out in front of you. Rest your head between your arms. 11. Gently push your chest toward the floor, reaching as far in front of you as possible. 12. Hold for 15 to 30 seconds. 13. Repeat 2 to 4 times. Shoulder rolls    10. Sit comfortably with your feet shoulder-width apart. You can also do this exercise while standing. 11. Roll your shoulders up, then back, and then down in a smooth, circular motion. 12. Repeat 2 to 4 times. Wall push-up    10. Stand against a wall with your feet about 12 to 24 inches back from the wall. If you feel any pain when you do this exercise, stand closer to the wall. 11. Place your hands on the wall slightly wider apart than your shoulders, and lean forward. 12. Gently lean your body toward the wall. Then push back to your starting position. Keep the motion smooth and controlled. 13. Repeat 8 to 12 times. Resisted shoulder blade squeeze    4. Sit or stand, holding the band in both hands in front of you. Keep your elbows close to your sides, bent at a 90-degree angle. Your palms should face up. 5. Squeeze your shoulder blades together, and move your arms to the outside, stretching the band. Be sure to keep your elbows at your sides while you do this. 6. Relax. 7. Repeat 8 to 12 times. Resisted rows    4. Put the band around a solid object, such as a bedpost, at about waist level. Hold one end of the band in each hand. 5. With your elbows at your sides and bent to 90 degrees, pull the band back to move your shoulder blades toward each other. Return to the starting position. 6. Repeat 8 to 12 times. Follow-up care is a key part of your treatment and safety. Be sure to make and go to all appointments, and call your doctor if you are having problems.  It's also a good idea to know your test results and keep a list of the medicines you take. Where can you learn more? Go to https://[email or web address].[email or web address] org and sign in to your [employer] account. Enter P558 in the [facility] box to learn more about \"Healthy Upper Back: Exercises. \"     If you do not have an account, please click on the \"Sign Up Now\" link. Current as of: September 20, 2018  Content Version: 12.0  © 0405-5856 Healthwise, Incorporated. Care instructions adapted under license by [facility] ([facility]). If you have questions about a medical condition or this instruction, always ask your healthcare professional. [address] any warranty or liability for your use of this information.

## 2019-06-11 LAB
ALBUMIN SERPL-MCNC: 4.1 G/DL
ALP BLD-CCNC: 52 U/L
ALT SERPL-CCNC: 18 U/L
ANION GAP SERPL CALCULATED.3IONS-SCNC: 6 MMOL/L
AST SERPL-CCNC: 15 U/L
BASOPHILS ABSOLUTE: NORMAL /ΜL
BASOPHILS RELATIVE PERCENT: NORMAL %
BILIRUB SERPL-MCNC: 0.6 MG/DL (ref 0.1–1.4)
BUN BLDV-MCNC: 11 MG/DL
CALCIUM SERPL-MCNC: 9.1 MG/DL
CHLORIDE BLD-SCNC: 105 MMOL/L
CHOLESTEROL, TOTAL: 126 MG/DL
CHOLESTEROL/HDL RATIO: 2.4
CO2: 27 MMOL/L
CREAT SERPL-MCNC: 0.66 MG/DL
EOSINOPHILS ABSOLUTE: NORMAL /ΜL
EOSINOPHILS RELATIVE PERCENT: NORMAL %
GFR CALCULATED: >60
GLUCOSE BLD-MCNC: 82 MG/DL
HCT VFR BLD CALC: 37.7 % (ref 36–46)
HDLC SERPL-MCNC: 53 MG/DL (ref 35–70)
HEMOGLOBIN: 13 G/DL (ref 12–16)
INR BLD: 1
IRON: 105
LDL CHOLESTEROL CALCULATED: 55 MG/DL (ref 0–160)
LYMPHOCYTES ABSOLUTE: NORMAL /ΜL
LYMPHOCYTES RELATIVE PERCENT: NORMAL %
MCH RBC QN AUTO: 31.3 PG
MCHC RBC AUTO-ENTMCNC: 34.6 G/DL
MCV RBC AUTO: 91 FL
MONOCYTES ABSOLUTE: NORMAL /ΜL
MONOCYTES RELATIVE PERCENT: NORMAL %
NEUTROPHILS ABSOLUTE: NORMAL /ΜL
NEUTROPHILS RELATIVE PERCENT: NORMAL %
PLATELET # BLD: 131 K/ΜL
PMV BLD AUTO: 10.5 FL
POTASSIUM SERPL-SCNC: 4.1 MMOL/L
PROTIME: 12 SECONDS
RBC # BLD: 4.16 10^6/ΜL
SODIUM BLD-SCNC: 138 MMOL/L
T4 FREE: 1.05
TOTAL IRON BINDING CAPACITY: 487
TOTAL PROTEIN: 6.4
TRIGL SERPL-MCNC: 91 MG/DL
TSH SERPL DL<=0.05 MIU/L-ACNC: 0.72 UIU/ML
VITAMIN D 25-HYDROXY: 21.3
VITAMIN D2, 25 HYDROXY: NORMAL
VITAMIN D3,25 HYDROXY: NORMAL
VLDLC SERPL CALC-MCNC: 18 MG/DL
WBC # BLD: 6.8 10^3/ML

## 2019-06-15 ASSESSMENT — ENCOUNTER SYMPTOMS
ABDOMINAL PAIN: 0
EYE PAIN: 0
CONSTIPATION: 0
RHINORRHEA: 0
SHORTNESS OF BREATH: 0
NAUSEA: 0
DIARRHEA: 0
BACK PAIN: 0
COUGH: 0
SORE THROAT: 0
VOMITING: 0

## 2019-06-17 ENCOUNTER — OFFICE VISIT (OUTPATIENT)
Dept: OBGYN CLINIC | Age: 39
End: 2019-06-17
Payer: MEDICARE

## 2019-06-17 VITALS
HEIGHT: 63 IN | DIASTOLIC BLOOD PRESSURE: 78 MMHG | BODY MASS INDEX: 18.54 KG/M2 | SYSTOLIC BLOOD PRESSURE: 110 MMHG | WEIGHT: 104.6 LBS

## 2019-06-17 DIAGNOSIS — N92.3 INTERMENSTRUAL BLEEDING: Primary | ICD-10-CM

## 2019-06-17 DIAGNOSIS — D25.1 INTRAMURAL LEIOMYOMA OF UTERUS: ICD-10-CM

## 2019-06-17 PROCEDURE — G8420 CALC BMI NORM PARAMETERS: HCPCS | Performed by: NURSE PRACTITIONER

## 2019-06-17 PROCEDURE — G8427 DOCREV CUR MEDS BY ELIG CLIN: HCPCS | Performed by: NURSE PRACTITIONER

## 2019-06-17 PROCEDURE — 99213 OFFICE O/P EST LOW 20 MIN: CPT | Performed by: NURSE PRACTITIONER

## 2019-06-17 PROCEDURE — 1036F TOBACCO NON-USER: CPT | Performed by: NURSE PRACTITIONER

## 2019-06-17 NOTE — PROGRESS NOTES
S-  Here to review US results from 6.4.19 for intramenstrual bleeding- happens for a few days monthly between her cycles- small amounts of mucousy/red discharge. Us notes 7.8cm intramural fibroid. Endometrial lining thin- 3.79 . Seeing her PCP for bruising issues  Labs drawn and fairly unremarkable  TSH wnl  Long discussion regarding hysteroscopy as recommended last year by Dr Emy Gao. She is open to scheduling a surgical consult and if bleeding continues, we can discuss other options    O-  Assessment deferred at this time  VS as charted    A-  Patient is a 37 y.o. [id number]  seen with total face to face time of 15 minutes. More than 50% of this visit was on counseling and education regarding her    Diagnosis Orders   1. Intermenstrual bleeding     2. Intramural leiomyoma of uterus      and her options. She was also counseled on her preventative health maintenance recommendations and follow-up of surgical consult. Refer to plan and assessment. Recent Results (from the past 8736 hour(s))   GYN Cytology    Collection Time: 05/28/19  8:52 AM   Result Value Ref Range    Cytology Report       [id number]  [email or web address]  [facility]  ANATOMIC PATHOLOGY  [address] [place] [address], [facility] [address] [place], [address]  [phone]  Fax: [phone]  GYNECOLOGIC CYTOLOGY REPORT    Patient Name: Best Castro  MR#: [id number]  Specimen #[id number]  Source:  1: Cervical material, (ThinPrep vial, Imaging-assisted review)    Clinical History  Menorrhagia: with irregular cycle N92.1  Z01.419 Routine gyn exam without abnormal findings  High risk HPV DNA testing is requested if the diagnosis is abnormal  LMP:  5/1/19  INTERPRETATION    Cervical material, (ThinPrep vial, Imaging-assisted review):  Specimen Adequacy:       Unsatisfactory for evaluation.   Specimen processed and examined but unsatisfactory   for evaluation of epithelial abnormality due to:       - Insufficient epithelial cell component, predominantly blood.  Descriptive Diagnosis:       Interpretation not possible due to unsatisfactory specimen  adequacy. Cytotechnologist:   ANIBAL Live CD(ASCP)   **Electronically Signed Out**  bee/6/6/2019         P-  Set up surgical consult w/Dr Emy Gao  Discuss options after hysteroscopy  RTO annual exam and prn

## 2019-06-20 ENCOUNTER — OFFICE VISIT (OUTPATIENT)
Dept: PRIMARY CARE CLINIC | Age: 39
End: 2019-06-20
Payer: MEDICARE

## 2019-06-20 VITALS
BODY MASS INDEX: 18.61 KG/M2 | DIASTOLIC BLOOD PRESSURE: 78 MMHG | HEIGHT: 63 IN | RESPIRATION RATE: 20 BRPM | HEART RATE: 76 BPM | TEMPERATURE: 97.9 F | OXYGEN SATURATION: 100 % | WEIGHT: 105 LBS | SYSTOLIC BLOOD PRESSURE: 119 MMHG

## 2019-06-20 DIAGNOSIS — Z00.00 HEALTHCARE MAINTENANCE: ICD-10-CM

## 2019-06-20 DIAGNOSIS — E55.9 VITAMIN D DEFICIENCY: ICD-10-CM

## 2019-06-20 DIAGNOSIS — T14.8XXA BRUISING: ICD-10-CM

## 2019-06-20 DIAGNOSIS — R63.6 UNDERWEIGHT: ICD-10-CM

## 2019-06-20 DIAGNOSIS — D69.6 THROMBOCYTOPENIA (HCC): ICD-10-CM

## 2019-06-20 DIAGNOSIS — D50.9 IRON DEFICIENCY ANEMIA, UNSPECIFIED IRON DEFICIENCY ANEMIA TYPE: ICD-10-CM

## 2019-06-20 DIAGNOSIS — D69.6 THROMBOCYTOPENIA (HCC): Primary | ICD-10-CM

## 2019-06-20 PROCEDURE — 99214 OFFICE O/P EST MOD 30 MIN: CPT | Performed by: PHYSICIAN ASSISTANT

## 2019-06-20 PROCEDURE — G8420 CALC BMI NORM PARAMETERS: HCPCS | Performed by: PHYSICIAN ASSISTANT

## 2019-06-20 PROCEDURE — G8427 DOCREV CUR MEDS BY ELIG CLIN: HCPCS | Performed by: PHYSICIAN ASSISTANT

## 2019-06-20 PROCEDURE — 1036F TOBACCO NON-USER: CPT | Performed by: PHYSICIAN ASSISTANT

## 2019-06-20 RX ORDER — LANOLIN ALCOHOL/MO/W.PET/CERES
325 CREAM (GRAM) TOPICAL EVERY OTHER DAY
Qty: 90 TABLET | Refills: 0 | Status: SHIPPED | OUTPATIENT
Start: 2019-06-20 | End: 2021-10-15

## 2019-06-20 NOTE — PROGRESS NOTES
[facility]  [address]  [address]  Dept: [phone]  Dept Fax: [phone]    Office Progress/Follow Up Note  Date of patient's visit: 6/20/2019  Patient's Name:  Daily Sanderson YOB: 1980            Patient Care Team:  Justin Zhao PA-C as PCP - General  Justin Zhao PA-C as PCP - [facility] Empaneled Provider  ================================================================    REASON FOR VISIT/CHIEF COMPLAINT:  Discuss Labs    HISTORY OF PRESENTING ILLNESS:  History was obtained from: patient. Daily Sanderson is a 37 y.o. is here for follow-up for myalgia, vitamin D deficiency, iron deficiency. Patient states she is compliant with medications. Patient states myalgia has improved mildly with stretches, instructed patient continue to perform stretches. Patient states she was seen by gynecology for multiple menstrual cycles every month, states has been occurring for the last 2 years, patient states she will refer to gynecology surgery for evaluation. Patient blood pressure is stable in office. Patient weight is stable. Labs reviewed, vitamin D deficiency, stable thrombocytopenia, elevated iron. Discussed vitamin D deficiency in depth with patient, instructed patient continue to take calcium/vitamin D supplements daily. Discussed thrombocytopenia in depth with patient, patient is requesting further evaluation, informed patient she will be referred to hematology. Discussed elevated iron in depth with patient, instructed patient to increase iron supplements to every other day. Health maintenance reviewed. Medications reviewed, prescribed iron 325 mg every other day.     HPI    Patient Active Problem List   Diagnosis    Other specified fetal and placental problems affecting management of mother, antepartum    Suspected placental problem not found    Dorsal back pain    DUB (dysfunctional uterine bleeding)    Vitamin D deficiency    Thrombocytopenia (HCC)    Underweight    Anemia    Allergic conjunctivitis    Decreased libido    Fatigue    Menorrhagia    Dizziness    Allergic conjunctivitis of both eyes    Iron deficiency anemia    Arthralgia of both hands    Central positional vertigo    Unilateral vestibular weakness    Uterine leiomyoma       Health Maintenance Due   Topic Date Due    DTaP/Tdap/Td vaccine (1 - Tdap) 05/14/1997       No Known Allergies      Current Outpatient Medications   Medication Sig Dispense Refill    ferrous sulfate (FE TABS) 325 (65 Fe) MG EC tablet Take 1 tablet by mouth every other day 90 tablet 0    Calcium Carbonate-Vitamin D (OYSTER SHELL CALCIUM/D) 500-200 MG-UNIT TABS TAKE ONE TABLET BY MOUTH ONCE DAILY 30 tablet 1    omeprazole (PRILOSEC) 20 MG delayed release capsule Take 1 capsule by mouth daily 30 capsule 3    EQ EYE ITCH RELIEF 0.025 % ophthalmic solution       fluticasone (FLONASE) 50 MCG/ACT nasal spray       ibuprofen (ADVIL;MOTRIN) 400 MG tablet Take 1 tablet by mouth every 8 hours as needed for Pain 90 tablet 1    fluocinolone acetonide (SYNALAR) 0.01 % external solution        No current facility-administered medications for this visit. Social History     Tobacco Use    Smoking status: Never Smoker    Smokeless tobacco: Never Used   Substance Use Topics    Alcohol use: No    Drug use: No       Family History   Problem Relation Age of Onset    High Blood Pressure Mother     High Blood Pressure Father         REVIEW OFSYSTEMS:  Review of Systems   Constitutional: Negative for chills and fever. HENT: Negative for congestion. Respiratory: Negative for cough, shortness of breath and wheezing. Cardiovascular: Negative for chest pain. Gastrointestinal: Negative for constipation, diarrhea, nausea and vomiting. Genitourinary: Negative for dysuria, frequency and urgency. Musculoskeletal: Positive for myalgias.  Negative for back pain and neck pain. Neurological: Negative for headaches. PHYSICAL EXAM:  Vitals:    06/20/19 0902   BP: 119/78   Site: Left Upper Arm   Position: Sitting   Cuff Size: Medium Adult   Pulse: 76   Resp: 20   Temp: 97.9 °F (36.6 °C)   TempSrc: Oral   SpO2: 100%   Weight: 105 lb (47.6 kg)   Height: 5' 2.75\" (1.594 m)     BP Readings from Last 3 Encounters:   06/26/19 118/70   06/20/19 119/78   06/17/19 110/78        Physical Exam   Constitutional: She is oriented to person, place, and time. Vital signs are normal. She appears well-developed and well-nourished. She is cooperative. HENT:   Head: Normocephalic and atraumatic. Right Ear: External ear normal.   Left Ear: External ear normal.   Nose: Nose normal.   Eyes: Pupils are equal, round, and reactive to light. Conjunctivae and lids are normal.   Cardiovascular: Normal rate, regular rhythm and normal heart sounds. Pulmonary/Chest: Effort normal and breath sounds normal.   Abdominal: Soft. She exhibits no mass. There is no tenderness. Musculoskeletal: She exhibits no tenderness. Neurological: She is alert and oriented to person, place, and time. Skin: Skin is warm and dry. Vitals reviewed. DIAGNOSTIC FINDINGS:  CBC:  Lab Results   Component Value Date    WBC 6.8 06/11/2019    HGB 13.0 06/11/2019     06/11/2019     06/07/2012       BMP:    Lab Results   Component Value Date     06/11/2019    K 4.1 06/11/2019     06/11/2019    CO2 27 06/11/2019    BUN 11 06/11/2019    CREATININE 0.66 06/11/2019    GLUCOSE 82 06/11/2019    GLUCOSE 76 06/07/2012       HEMOGLOBIN A1C:   Lab Results   Component Value Date    LABA1C 5.0 04/09/2018       FASTING LIPID PANEL:  Lab Results   Component Value Date    CHOL 126 06/11/2019    HDL 53 06/11/2019    TRIG 91 06/11/2019       ASSESSMENT AND PLAN:  Kandi Banegas was seen today for discuss labs.     Diagnoses and all orders for this visit:    [facility])  -     [facility]    Vitamin D deficiency    Iron deficiency anemia, unspecified iron deficiency anemia type  -     ferrous sulfate (FE TABS) 325 (65 Fe) MG EC tablet; Take 1 tablet by mouth every other day      FOLLOW UP AND INSTRUCTIONS:  Return if symptoms worsen or fail to improve. · Discussed use, benefit, and side effects of prescribed medications. Barriers to medication compliance addressed. All patient questions answered. Pt voiced understanding. · Patient given educational materials - see patient instructions    Electronically signed by Sabrina Carvajal PA-C on 6/20/19 at 9:19 AM    This note is created with the assistance of a speech-recognition program. While intendingto generate a document that actually reflects the content of the visit, the document can still have some mistakes which may not have been identified and corrected by editing.

## 2019-06-24 ENCOUNTER — TELEPHONE (OUTPATIENT)
Dept: ONCOLOGY | Age: 39
End: 2019-06-24

## 2019-06-24 NOTE — TELEPHONE ENCOUNTER
Received internal referral from Candy William PA-C for pt to see hem/onc for thrombocytopenia.   Attempted to call pt, no answer, unable to leave a message-referral in def que

## 2019-06-26 ENCOUNTER — INITIAL CONSULT (OUTPATIENT)
Dept: OBGYN CLINIC | Age: 39
End: 2019-06-26
Payer: MEDICARE

## 2019-06-26 VITALS — SYSTOLIC BLOOD PRESSURE: 118 MMHG | HEIGHT: 63 IN | DIASTOLIC BLOOD PRESSURE: 70 MMHG | BODY MASS INDEX: 18.75 KG/M2

## 2019-06-26 DIAGNOSIS — N92.1 MENOMETRORRHAGIA: Primary | ICD-10-CM

## 2019-06-26 PROCEDURE — G8427 DOCREV CUR MEDS BY ELIG CLIN: HCPCS | Performed by: OBSTETRICS & GYNECOLOGY

## 2019-06-26 PROCEDURE — G8420 CALC BMI NORM PARAMETERS: HCPCS | Performed by: OBSTETRICS & GYNECOLOGY

## 2019-06-26 PROCEDURE — 1036F TOBACCO NON-USER: CPT | Performed by: OBSTETRICS & GYNECOLOGY

## 2019-06-26 PROCEDURE — 99213 OFFICE O/P EST LOW 20 MIN: CPT | Performed by: OBSTETRICS & GYNECOLOGY

## 2019-06-26 NOTE — PROGRESS NOTES
though it is less sure with the small fibroid. Still, she wants to avoid hysterectomy, and this would be a much milder option. IMP:   Encounter Diagnosis   Name Primary?  Menometrorrhagia Yes         PLAN: I will give her information about endometrial ablation, but give her a month to consider and to have her hematology consult.   Meanwhile, I will write Tee Cornelius with this information

## 2019-06-29 ASSESSMENT — ENCOUNTER SYMPTOMS
NAUSEA: 0
SHORTNESS OF BREATH: 0
VOMITING: 0
CONSTIPATION: 0
BACK PAIN: 0
WHEEZING: 0
DIARRHEA: 0
COUGH: 0

## 2019-07-11 DIAGNOSIS — M54.9 DORSAL BACK PAIN: ICD-10-CM

## 2019-07-11 RX ORDER — IBUPROFEN 400 MG/1
400 TABLET ORAL EVERY 8 HOURS PRN
Qty: 90 TABLET | Refills: 2 | Status: SHIPPED | OUTPATIENT
Start: 2019-07-11 | End: 2020-08-20

## 2020-01-22 ENCOUNTER — OFFICE VISIT (OUTPATIENT)
Dept: OBGYN CLINIC | Age: 40
End: 2020-01-22
Payer: COMMERCIAL

## 2020-01-22 VITALS
WEIGHT: 104 LBS | SYSTOLIC BLOOD PRESSURE: 120 MMHG | DIASTOLIC BLOOD PRESSURE: 68 MMHG | HEIGHT: 63 IN | BODY MASS INDEX: 18.43 KG/M2

## 2020-01-22 PROCEDURE — G8484 FLU IMMUNIZE NO ADMIN: HCPCS | Performed by: OBSTETRICS & GYNECOLOGY

## 2020-01-22 PROCEDURE — 1036F TOBACCO NON-USER: CPT | Performed by: OBSTETRICS & GYNECOLOGY

## 2020-01-22 PROCEDURE — 99213 OFFICE O/P EST LOW 20 MIN: CPT | Performed by: OBSTETRICS & GYNECOLOGY

## 2020-01-22 PROCEDURE — G8427 DOCREV CUR MEDS BY ELIG CLIN: HCPCS | Performed by: OBSTETRICS & GYNECOLOGY

## 2020-01-22 PROCEDURE — G8420 CALC BMI NORM PARAMETERS: HCPCS | Performed by: OBSTETRICS & GYNECOLOGY

## 2020-01-22 RX ORDER — MEDROXYPROGESTERONE ACETATE 10 MG/1
10 TABLET ORAL DAILY
Qty: 10 TABLET | Refills: 0 | Status: SHIPPED | OUTPATIENT
Start: 2020-01-22 | End: 2020-08-20 | Stop reason: ALTCHOICE

## 2020-01-22 NOTE — PROGRESS NOTES
Patient is a 37 y.o. [id number]  seen with total face to face time of 15 minutes. More than 50% of this visit was on counseling and education regarding her menometrorrhagia and her options. She was also counseled on her preventative health maintenance recommendations and follow-up. Blood pressure 120/68, height 5' 2.75\" (1.594 m), weight 104 lb (47.2 kg), not currently breastfeeding. Recent Results (from the past 8736 hour(s))   GYN Cytology    Collection Time: 05/28/19  8:52 AM   Result Value Ref Range    Cytology Report       [id number]  [facility]  [facility]  ANATOMIC PATHOLOGY  [address] [place] [address],  [address]. [place], [address]  [phone]  Fax: [phone]  GYNECOLOGIC CYTOLOGY REPORT    Patient Name: Theresa Frankel  MR#: [id number]  Specimen #[id number]  Source:  1: Cervical material, (ThinPrep vial, Imaging-assisted review)    Clinical History  Menorrhagia: with irregular cycle N92.1  Z01.419 Routine gyn exam without abnormal findings  High risk HPV DNA testing is requested if the diagnosis is abnormal  LMP:  5/1/19  INTERPRETATION    Cervical material, (ThinPrep vial, Imaging-assisted review):  Specimen Adequacy:       Unsatisfactory for evaluation. Specimen processed and examined but unsatisfactory   for evaluation of epithelial abnormality due to:       - Insufficient epithelial cell component, predominantly blood. Descriptive Diagnosis:       Interpretation not possible due to unsatisfactory specimen  adequacy. Cytotechnologist:   ANIBAL Live CD(ASCP)   **Electronically Signed Out**  bee/6/6/2019     TSH without Reflex    Collection Time: 06/11/19 12:00 AM   Result Value Ref Range    TSH 0.72 uIU/mL   T4, Free    Collection Time: 06/11/19 12:00 AM   Result Value Ref Range    T4 Free 1.05    Iron and TIBC    Collection Time: 06/11/19 12:00 AM   Result Value Ref Range    Iron 105     TIBC 487    Vitamin D 25 Hydroxy    Collection Time: 06/11/19 12:00 AM Result Value Ref Range    Vit D, 25-Hydroxy 21.3     Vitamin D3,25 Hydroxy      Vitamin D2, 25 Hydroxy     Protime-INR    Collection Time: 06/11/19 12:00 AM   Result Value Ref Range    Protime 12.0 seconds    INR 1.0    CBC    Collection Time: 06/11/19 12:00 AM   Result Value Ref Range    WBC 6.8 10^3/mL    Hemoglobin 13.0 12.0 - 16.0 g/dL    Hematocrit 37.7 36 - 46 %    Platelets 562 K/µL    Neutrophils %  %    Lymphocytes %  %    Monocytes %  %    Eosinophils %  %    Basophils %  %    Neutrophils Absolute  /µL    Lymphocytes Absolute  /µL    Monocytes Absolute  /µL    Eosinophils Absolute  /µL    Basophils Absolute  /µL    RBC 4.16 10^6/µL    MCV 91 fL    MCH 31.3 pg    MCHC 34.6 g/dL    MPV 10.5 fL   Lipid Panel    Collection Time: 06/11/19 12:00 AM   Result Value Ref Range    Cholesterol, Total 126 mg/dL    HDL 53 35 - 70 mg/dL    LDL Calculated 55 0 - 160 mg/dL    Triglycerides 91 mg/dL    Chol/HDL Ratio 2.4     VLDL 18 mg/dL   Comprehensive Metabolic Panel    Collection Time: 06/11/19 12:00 AM   Result Value Ref Range    Sodium 138 mmol/L    Chloride 105 mmol/L    Potassium 4.1 mmol/L    BUN 11 mg/dL    CREATININE 0.66     Glucose 82 mg/dL    AST 15 U/L    ALT 18 U/L    Calcium 9.1 mg/dL    Total Protein 6.4     CO2 27 mmol/L    Alb 4.1     Alkaline Phosphatase 52 U/L    Total Bilirubin 0.6 0.1 - 1.4 mg/dL    Gfr Calculated >60     Anion Gap 6 mmol/L       The patient is still having frequent bleeding, reporting she has spotting almost every day. We had talked about options at her last visit but she did not pursue that because of insurance issues. Now she is on care source and interested in discussing it again. I went over the options once more of hysterectomy, endometrial ablation, or trying hormonal management. There may be a bit of language barrier, but she is not sure yet which she wants to do. She is scared of surgery.   We finally agreed to try the progesterone x10 days to see if it will stop the

## 2020-01-23 ENCOUNTER — TELEPHONE (OUTPATIENT)
Dept: OBGYN CLINIC | Age: 40
End: 2020-01-23

## 2020-01-28 ENCOUNTER — TELEPHONE (OUTPATIENT)
Dept: OBGYN CLINIC | Age: 40
End: 2020-01-28

## 2020-01-28 NOTE — TELEPHONE ENCOUNTER
Attempted to call patient. Writer spoke with her in person last week with a couple questions regarding her surgery. Attempt to call back made but phone sounded like it was answered with no response and immediate hang up. Will attempt again.

## 2020-01-30 ENCOUNTER — TELEPHONE (OUTPATIENT)
Dept: OBGYN CLINIC | Age: 40
End: 2020-01-30

## 2020-01-30 RX ORDER — TRANEXAMIC ACID 650 1/1
1300 TABLET ORAL 3 TIMES DAILY
Qty: 30 TABLET | Refills: 0 | Status: SHIPPED | OUTPATIENT
Start: 2020-01-30 | End: 2020-08-20 | Stop reason: ALTCHOICE

## 2020-01-30 NOTE — PROGRESS NOTES
Phone call: 38 y/o Pt calling states she continues to have bleeding every day and wanted to know if her Rx can be changed to stop bleeding. Pt would like to talk with .      Bleeding:  Describes it as spotting when waking and when up to bathroom.      I agreed to try Lysteda 1300 mg tid

## 2020-02-03 NOTE — TELEPHONE ENCOUNTER
Pt calling to say that her bleeding has not stopped on Lysteda and no one has called her to schedule PAT. Pt bleeding has improved and is having spotting. Explained to pt it may not go completely away but it's working if it's significant better. Gave pt number to get r/s after missing her PAT appt.

## 2020-02-05 ENCOUNTER — HOSPITAL ENCOUNTER (OUTPATIENT)
Dept: PREADMISSION TESTING | Age: 40
Discharge: HOME | End: 2020-02-09
Payer: COMMERCIAL

## 2020-02-05 VITALS
SYSTOLIC BLOOD PRESSURE: 132 MMHG | TEMPERATURE: 98.6 F | RESPIRATION RATE: 20 BRPM | OXYGEN SATURATION: 100 % | BODY MASS INDEX: 19.07 KG/M2 | DIASTOLIC BLOOD PRESSURE: 83 MMHG | HEIGHT: 62 IN | WEIGHT: 103.62 LBS | HEART RATE: 74 BPM

## 2020-02-05 LAB
-: NORMAL
AMORPHOUS: NORMAL
BACTERIA: NORMAL
BILIRUBIN URINE: NEGATIVE
CASTS UA: NORMAL /LPF (ref 0–8)
COLOR: YELLOW
COMMENT UA: ABNORMAL
CRYSTALS, UA: NORMAL /HPF
EPITHELIAL CELLS UA: NORMAL /HPF (ref 0–5)
GLUCOSE URINE: NEGATIVE
HCT VFR BLD CALC: 36.3 % (ref 36.3–47.1)
HEMOGLOBIN: 11.8 G/DL (ref 11.9–15.1)
KETONES, URINE: NEGATIVE
LEUKOCYTE ESTERASE, URINE: NEGATIVE
MCH RBC QN AUTO: 30.3 PG (ref 25.2–33.5)
MCHC RBC AUTO-ENTMCNC: 32.5 G/DL (ref 28.4–34.8)
MCV RBC AUTO: 93.1 FL (ref 82.6–102.9)
MUCUS: NORMAL
NITRITE, URINE: NEGATIVE
NRBC AUTOMATED: 0 PER 100 WBC
OTHER OBSERVATIONS UA: NORMAL
PDW BLD-RTO: 12.9 % (ref 11.8–14.4)
PH UA: 5.5 (ref 5–8)
PLATELET # BLD: 172 K/UL (ref 138–453)
PMV BLD AUTO: 11.3 FL (ref 8.1–13.5)
PROTEIN UA: NEGATIVE
RBC # BLD: 3.9 M/UL (ref 3.95–5.11)
RBC UA: NORMAL /HPF (ref 0–4)
RENAL EPITHELIAL, UA: NORMAL /HPF
SPECIFIC GRAVITY UA: 1.01 (ref 1–1.03)
TRICHOMONAS: NORMAL
TURBIDITY: CLEAR
URINE HGB: ABNORMAL
UROBILINOGEN, URINE: NORMAL
WBC # BLD: 6.6 K/UL (ref 3.5–11.3)
WBC UA: NORMAL /HPF (ref 0–5)
YEAST: NORMAL

## 2020-02-05 PROCEDURE — 36415 COLL VENOUS BLD VENIPUNCTURE: CPT

## 2020-02-05 PROCEDURE — 85027 COMPLETE CBC AUTOMATED: CPT

## 2020-02-05 PROCEDURE — 81001 URINALYSIS AUTO W/SCOPE: CPT

## 2020-02-05 RX ORDER — SODIUM CHLORIDE, SODIUM LACTATE, POTASSIUM CHLORIDE, CALCIUM CHLORIDE 600; 310; 30; 20 MG/100ML; MG/100ML; MG/100ML; MG/100ML
1000 INJECTION, SOLUTION INTRAVENOUS CONTINUOUS
Status: CANCELLED | OUTPATIENT
Start: 2020-02-05

## 2020-02-05 NOTE — ANESTHESIA PRE-OP
Anesthesia Focused Assessment    STOP-BANG Sleep Apnea Questionnaire    Obstructive Sleep Apnea:                                                                 No     Machine used:                                                                                  No            SNORE loudly (heard through closed doors)? No      TIRED, fatigued, sleepy during daytime? No      OBSERVED stopping breathing during sleep? No      High blood PRESSURE being treated? No      BMI over 35? No  AGE over 48? No  NECK circumference over 16\"? No  GENDER (male)? No                High risk 5-8  Intermediate risk 3-4  Low risk 0-2    Total 3  High risk 5-8  Intermediate risk 3-4  Low risk 0-2      Type 1 DM:                                                                                        No    TYPE 2:                                                                                             No    If yes, insulin dependent? No    Coronary Artery Disease :                                                                No        Active smoker                                                                                   No    Drinks Alcohol                                                                                   No    Dentition: Dentures                                                                            No              Partial                                                                                                 No    Any loose or missing teeth                                                                 No    Defib / AICD / Pacemaker:                                                               No    Renal Failure: No    If Yes, On dialysis?       Hx of anesthesia complications with Patient                               No    Hx of anesthesia complications with family No    Medical or cardiac clearance ordered:                                         No    Anesthesiologist called:                                                                No    GARRETT Escobar PA-C  Electronically signed 2/5/2020 at 9:10 AM

## 2020-02-07 ENCOUNTER — HOSPITAL ENCOUNTER (OUTPATIENT)
Age: 40
Setting detail: OUTPATIENT SURGERY
Discharge: HOME | End: 2020-02-07
Attending: OBSTETRICS & GYNECOLOGY | Admitting: OBSTETRICS & GYNECOLOGY
Payer: COMMERCIAL

## 2020-02-07 ENCOUNTER — ANESTHESIA (OUTPATIENT)
Dept: OPERATING ROOM | Age: 40
End: 2020-02-07
Payer: COMMERCIAL

## 2020-02-07 ENCOUNTER — ANESTHESIA EVENT (OUTPATIENT)
Dept: OPERATING ROOM | Age: 40
End: 2020-02-07
Payer: COMMERCIAL

## 2020-02-07 VITALS
BODY MASS INDEX: 18.95 KG/M2 | HEIGHT: 62 IN | SYSTOLIC BLOOD PRESSURE: 134 MMHG | RESPIRATION RATE: 16 BRPM | WEIGHT: 103 LBS | DIASTOLIC BLOOD PRESSURE: 79 MMHG | TEMPERATURE: 97.9 F | HEART RATE: 66 BPM | OXYGEN SATURATION: 100 %

## 2020-02-07 VITALS
OXYGEN SATURATION: 100 % | DIASTOLIC BLOOD PRESSURE: 86 MMHG | TEMPERATURE: 96 F | RESPIRATION RATE: 13 BRPM | SYSTOLIC BLOOD PRESSURE: 114 MMHG

## 2020-02-07 PROBLEM — Z98.890 STATUS POST HYSTEROSCOPY: Status: ACTIVE | Noted: 2020-02-07

## 2020-02-07 PROCEDURE — 7100000001 HC PACU RECOVERY - ADDTL 15 MIN: Performed by: OBSTETRICS & GYNECOLOGY

## 2020-02-07 PROCEDURE — 7100000041 HC SPAR PHASE II RECOVERY - ADDTL 15 MIN: Performed by: OBSTETRICS & GYNECOLOGY

## 2020-02-07 PROCEDURE — 2500000003 HC RX 250 WO HCPCS: Performed by: NURSE ANESTHETIST, CERTIFIED REGISTERED

## 2020-02-07 PROCEDURE — 3700000001 HC ADD 15 MINUTES (ANESTHESIA): Performed by: OBSTETRICS & GYNECOLOGY

## 2020-02-07 PROCEDURE — 3600000004 HC SURGERY LEVEL 4 BASE: Performed by: OBSTETRICS & GYNECOLOGY

## 2020-02-07 PROCEDURE — 58563 HYSTEROSCOPY ABLATION: CPT | Performed by: OBSTETRICS & GYNECOLOGY

## 2020-02-07 PROCEDURE — 2720000010 HC SURG SUPPLY STERILE: Performed by: OBSTETRICS & GYNECOLOGY

## 2020-02-07 PROCEDURE — 7100000000 HC PACU RECOVERY - FIRST 15 MIN: Performed by: OBSTETRICS & GYNECOLOGY

## 2020-02-07 PROCEDURE — 2580000003 HC RX 258: Performed by: ANESTHESIOLOGY

## 2020-02-07 PROCEDURE — 2709999900 HC NON-CHARGEABLE SUPPLY: Performed by: OBSTETRICS & GYNECOLOGY

## 2020-02-07 PROCEDURE — 6370000000 HC RX 637 (ALT 250 FOR IP): Performed by: ANESTHESIOLOGY

## 2020-02-07 PROCEDURE — 88305 TISSUE EXAM BY PATHOLOGIST: CPT

## 2020-02-07 PROCEDURE — 2580000003 HC RX 258: Performed by: OBSTETRICS & GYNECOLOGY

## 2020-02-07 PROCEDURE — 3700000000 HC ANESTHESIA ATTENDED CARE: Performed by: OBSTETRICS & GYNECOLOGY

## 2020-02-07 PROCEDURE — 3600000014 HC SURGERY LEVEL 4 ADDTL 15MIN: Performed by: OBSTETRICS & GYNECOLOGY

## 2020-02-07 PROCEDURE — 6360000002 HC RX W HCPCS: Performed by: NURSE ANESTHETIST, CERTIFIED REGISTERED

## 2020-02-07 PROCEDURE — 7100000040 HC SPAR PHASE II RECOVERY - FIRST 15 MIN: Performed by: OBSTETRICS & GYNECOLOGY

## 2020-02-07 PROCEDURE — 81025 URINE PREGNANCY TEST: CPT

## 2020-02-07 RX ORDER — ONDANSETRON 2 MG/ML
4 INJECTION INTRAMUSCULAR; INTRAVENOUS
Status: DISCONTINUED | OUTPATIENT
Start: 2020-02-07 | End: 2020-02-07 | Stop reason: HOSPADM

## 2020-02-07 RX ORDER — HYDROCODONE BITARTRATE AND ACETAMINOPHEN 5; 325 MG/1; MG/1
1 TABLET ORAL EVERY 6 HOURS PRN
Qty: 6 TABLET | Refills: 0 | Status: SHIPPED | OUTPATIENT
Start: 2020-02-07 | End: 2020-02-10

## 2020-02-07 RX ORDER — DEXAMETHASONE SODIUM PHOSPHATE 10 MG/ML
INJECTION INTRAMUSCULAR; INTRAVENOUS PRN
Status: DISCONTINUED | OUTPATIENT
Start: 2020-02-07 | End: 2020-02-07 | Stop reason: SDUPTHER

## 2020-02-07 RX ORDER — IBUPROFEN 800 MG/1
800 TABLET ORAL EVERY 8 HOURS PRN
Qty: 30 TABLET | Refills: 0 | Status: SHIPPED | OUTPATIENT
Start: 2020-02-07

## 2020-02-07 RX ORDER — HYDROCODONE BITARTRATE AND ACETAMINOPHEN 5; 325 MG/1; MG/1
1 TABLET ORAL
Status: COMPLETED | OUTPATIENT
Start: 2020-02-07 | End: 2020-02-07

## 2020-02-07 RX ORDER — LIDOCAINE HYDROCHLORIDE 10 MG/ML
INJECTION, SOLUTION EPIDURAL; INFILTRATION; INTRACAUDAL; PERINEURAL PRN
Status: DISCONTINUED | OUTPATIENT
Start: 2020-02-07 | End: 2020-02-07 | Stop reason: SDUPTHER

## 2020-02-07 RX ORDER — ONDANSETRON 2 MG/ML
INJECTION INTRAMUSCULAR; INTRAVENOUS PRN
Status: DISCONTINUED | OUTPATIENT
Start: 2020-02-07 | End: 2020-02-07 | Stop reason: SDUPTHER

## 2020-02-07 RX ORDER — FENTANYL CITRATE 50 UG/ML
25 INJECTION, SOLUTION INTRAMUSCULAR; INTRAVENOUS EVERY 5 MIN PRN
Status: DISCONTINUED | OUTPATIENT
Start: 2020-02-07 | End: 2020-02-07 | Stop reason: HOSPADM

## 2020-02-07 RX ORDER — CEFAZOLIN SODIUM 2 G/50ML
SOLUTION INTRAVENOUS PRN
Status: DISCONTINUED | OUTPATIENT
Start: 2020-02-07 | End: 2020-02-07 | Stop reason: SDUPTHER

## 2020-02-07 RX ORDER — FENTANYL CITRATE 50 UG/ML
50 INJECTION, SOLUTION INTRAMUSCULAR; INTRAVENOUS EVERY 5 MIN PRN
Status: DISCONTINUED | OUTPATIENT
Start: 2020-02-07 | End: 2020-02-07 | Stop reason: HOSPADM

## 2020-02-07 RX ORDER — FENTANYL CITRATE 50 UG/ML
INJECTION, SOLUTION INTRAMUSCULAR; INTRAVENOUS PRN
Status: DISCONTINUED | OUTPATIENT
Start: 2020-02-07 | End: 2020-02-07 | Stop reason: SDUPTHER

## 2020-02-07 RX ORDER — PROPOFOL 10 MG/ML
INJECTION, EMULSION INTRAVENOUS PRN
Status: DISCONTINUED | OUTPATIENT
Start: 2020-02-07 | End: 2020-02-07 | Stop reason: SDUPTHER

## 2020-02-07 RX ORDER — MEPERIDINE HYDROCHLORIDE 50 MG/ML
12.5 INJECTION INTRAMUSCULAR; INTRAVENOUS; SUBCUTANEOUS EVERY 5 MIN PRN
Status: DISCONTINUED | OUTPATIENT
Start: 2020-02-07 | End: 2020-02-07 | Stop reason: HOSPADM

## 2020-02-07 RX ORDER — SODIUM CHLORIDE, SODIUM LACTATE, POTASSIUM CHLORIDE, CALCIUM CHLORIDE 600; 310; 30; 20 MG/100ML; MG/100ML; MG/100ML; MG/100ML
1000 INJECTION, SOLUTION INTRAVENOUS CONTINUOUS
Status: DISCONTINUED | OUTPATIENT
Start: 2020-02-07 | End: 2020-02-07 | Stop reason: HOSPADM

## 2020-02-07 RX ORDER — MAGNESIUM HYDROXIDE 1200 MG/15ML
LIQUID ORAL CONTINUOUS PRN
Status: COMPLETED | OUTPATIENT
Start: 2020-02-07 | End: 2020-02-07

## 2020-02-07 RX ORDER — MIDAZOLAM HYDROCHLORIDE 1 MG/ML
INJECTION INTRAMUSCULAR; INTRAVENOUS PRN
Status: DISCONTINUED | OUTPATIENT
Start: 2020-02-07 | End: 2020-02-07 | Stop reason: SDUPTHER

## 2020-02-07 RX ORDER — PROPOFOL 10 MG/ML
INJECTION, EMULSION INTRAVENOUS CONTINUOUS PRN
Status: DISCONTINUED | OUTPATIENT
Start: 2020-02-07 | End: 2020-02-07 | Stop reason: SDUPTHER

## 2020-02-07 RX ADMIN — PROPOFOL 100 MCG/KG/MIN: 10 INJECTION, EMULSION INTRAVENOUS at 10:00

## 2020-02-07 RX ADMIN — LIDOCAINE HYDROCHLORIDE 50 MG: 10 INJECTION, SOLUTION EPIDURAL; INFILTRATION; INTRACAUDAL; PERINEURAL at 09:56

## 2020-02-07 RX ADMIN — CEFAZOLIN SODIUM 2 G: 2 SOLUTION INTRAVENOUS at 10:00

## 2020-02-07 RX ADMIN — FENTANYL CITRATE 50 MCG: 50 INJECTION INTRAMUSCULAR; INTRAVENOUS at 09:51

## 2020-02-07 RX ADMIN — FENTANYL CITRATE 25 MCG: 50 INJECTION INTRAMUSCULAR; INTRAVENOUS at 09:56

## 2020-02-07 RX ADMIN — ONDANSETRON 4 MG: 2 INJECTION, SOLUTION INTRAMUSCULAR; INTRAVENOUS at 10:33

## 2020-02-07 RX ADMIN — SODIUM CHLORIDE, POTASSIUM CHLORIDE, SODIUM LACTATE AND CALCIUM CHLORIDE 1000 ML: 600; 310; 30; 20 INJECTION, SOLUTION INTRAVENOUS at 09:02

## 2020-02-07 RX ADMIN — DEXAMETHASONE SODIUM PHOSPHATE 10 MG: 10 INJECTION INTRAMUSCULAR; INTRAVENOUS at 10:07

## 2020-02-07 RX ADMIN — HYDROCODONE BITARTRATE AND ACETAMINOPHEN 1 TABLET: 5; 325 TABLET ORAL at 12:35

## 2020-02-07 RX ADMIN — FENTANYL CITRATE 25 MCG: 50 INJECTION INTRAMUSCULAR; INTRAVENOUS at 10:24

## 2020-02-07 RX ADMIN — MIDAZOLAM HYDROCHLORIDE 2 MG: 1 INJECTION, SOLUTION INTRAMUSCULAR; INTRAVENOUS at 09:51

## 2020-02-07 RX ADMIN — PROPOFOL 120 MG: 10 INJECTION, EMULSION INTRAVENOUS at 09:56

## 2020-02-07 ASSESSMENT — PAIN SCALES - GENERAL
PAINLEVEL_OUTOF10: 0
PAINLEVEL_OUTOF10: 0

## 2020-02-07 ASSESSMENT — PULMONARY FUNCTION TESTS
PIF_VALUE: 10
PIF_VALUE: 11
PIF_VALUE: 10
PIF_VALUE: 2
PIF_VALUE: 10
PIF_VALUE: 10
PIF_VALUE: 13
PIF_VALUE: 1
PIF_VALUE: 10
PIF_VALUE: 14
PIF_VALUE: 10
PIF_VALUE: 13
PIF_VALUE: 1
PIF_VALUE: 10
PIF_VALUE: 0
PIF_VALUE: 3
PIF_VALUE: 10
PIF_VALUE: 1
PIF_VALUE: 15
PIF_VALUE: 13
PIF_VALUE: 1
PIF_VALUE: 10
PIF_VALUE: 21
PIF_VALUE: 10
PIF_VALUE: 11
PIF_VALUE: 10
PIF_VALUE: 1
PIF_VALUE: 4

## 2020-02-07 ASSESSMENT — PAIN - FUNCTIONAL ASSESSMENT: PAIN_FUNCTIONAL_ASSESSMENT: 0-10

## 2020-02-07 NOTE — ANESTHESIA PRE PROCEDURE
antepartum O36.8990, O43.899    Suspected placental problem not found Z03.72    Dorsal back pain M54.9    DUB (dysfunctional uterine bleeding) N93.8    Vitamin D deficiency E55.9    Thrombocytopenia (HCC) D69.6    Underweight R63.6    Anemia D64.9    Allergic conjunctivitis H10.10    Decreased libido R68.82    Fatigue R53.83    Menorrhagia N92.0    Dizziness R42    Allergic conjunctivitis of both eyes H10.13    Iron deficiency anemia D50.9    Arthralgia of both hands M25.541, M25.542    Central positional vertigo H81.4    Unilateral vestibular weakness H81.8X9    Uterine leiomyoma D25.9    20 Hysteroscopy, D&C, Novasure ablation  Z98.890       Past Medical History:        Diagnosis Date    Dorsal back pain 2013    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[facility])        Past Surgical History:        Procedure Laterality Date     SECTION, LOW TRANSVERSE  2004     SECTION, LOW TRANSVERSE  2008     SECTION, LOW TRANSVERSE  10/2012    TUBAL LIGATION  10/8/12       Social History:    Social History     Tobacco Use    Smoking status: Never Smoker    Smokeless tobacco: Never Used   Substance Use Topics    Alcohol use:  No                                Counseling given: Not Answered      Vital Signs (Current):   Vitals:    20 0841   BP: 138/80   Pulse: 87   Resp: 16   Temp: 98.1 °F (36.7 °C)   TempSrc: Temporal   SpO2: 100%   Weight: 103 lb (46.7 kg)   Height: 5' 2\" (1.575 m)                                              BP Readings from Last 3 Encounters:   20 138/80   20 132/83   20 120/68       NPO Status: Time of last liquid consumption:                         Time of last solid consumption:                         Date of last liquid consumption: 20                        Date of last solid food consumption: 20    BMI:   Wt Readings from Last 3 Encounters:   20 103 lb (46.7 kg)   20 103 lb 9.9 oz (47 kg)   20 104 lb (47.2 kg)     Body mass index is 18.84 kg/m². CBC:   Lab Results   Component Value Date    WBC 6.6 02/05/2020    RBC 3.90 02/05/2020    RBC 3.61 06/07/2012    HGB 11.8 02/05/2020    HCT 36.3 02/05/2020    MCV 93.1 02/05/2020    RDW 12.9 02/05/2020     02/05/2020     06/07/2012       CMP:   Lab Results   Component Value Date     06/11/2019    K 4.1 06/11/2019     06/11/2019    CO2 27 06/11/2019    BUN 11 06/11/2019    CREATININE 0.66 06/11/2019    GFRAA >60 06/07/2012    LABGLOM >60 06/11/2019    LABGLOM >60 06/07/2012    GLUCOSE 82 06/11/2019    GLUCOSE 76 06/07/2012    CALCIUM 9.1 06/11/2019    BILITOT 0.6 06/11/2019    ALKPHOS 52 06/11/2019    AST 15 06/11/2019    ALT 18 06/11/2019       POC Tests: No results for input(s): POCGLU, POCNA, POCK, POCCL, POCBUN, POCHEMO, POCHCT in the last 72 hours. Coags:   Lab Results   Component Value Date    PROTIME 12.0 06/11/2019    INR 1.0 06/11/2019       HCG (If Applicable):   Lab Results   Component Value Date    PREGTESTUR positive 04/25/2012        ABGs: No results found for: PHART, PO2ART, YVS2ABU, FJZ0SJI, BEART, C5IOJUQR     Type & Screen (If Applicable):  No results found for: LABABO, [address]    Anesthesia Evaluation  Patient summary reviewed and Nursing notes reviewed no history of anesthetic complications:   Airway: Mallampati: III  TM distance: >3 FB   Neck ROM: full  Mouth opening: > = 3 FB Dental: normal exam         Pulmonary:Negative Pulmonary ROS and normal exam                               Cardiovascular:Negative CV ROS                      Neuro/Psych:   (+) psychiatric history:            GI/Hepatic/Renal: Neg GI/Hepatic/Renal ROS            Endo/Other: Negative Endo/Other ROS                    Abdominal:           Vascular: negative vascular ROS. Anesthesia Plan      general     ASA 2       Induction: intravenous. MIPS: Postoperative opioids intended.   Anesthetic plan and risks

## 2020-02-07 NOTE — PROGRESS NOTES
Patient decided she would like her pain medication before discharge. Order in [facility], awaiting pharmacy verification.

## 2020-02-07 NOTE — OP NOTE
Operative Note  Department of Obstetrics and Gynecology  [address] [facility]       Patient: Rehana Bernstein   : 1980  MRN: [id number]       Acct: [de-identified]   PCP: Leopoldo Goring, PA-C  Date of Procedure: 20    Pre-operative Diagnosis: 37 y.o. female [id number]   Menometorrhagia     Post-operative Diagnosis: same    Procedure: Hysteroscopy, dilation and curretage, Novasure ablation     Surgeon: Dr. Adrián Elaine MD  Assistants: Austin Lindquist DO; PGY1    Anesthesia: general    Indications: The patient is a 37y.o.-year-old [id number] with who is status post tubal   ligation and thus has completed her family status and desires novasure endometrial ablation for resolution of her symptoms of menometorrhagia. Procedure Details: The patient was seen in the pre-op room. The risks, benefits, complications, treatment options, and expected outcomes were discussed with the patient. The patient concurred with the proposed plan, giving informed consent. The patient was taken to the Operating Room and identified as Rehana Bernstein and the procedure was verified. A Time Out was held and the above information confirmed. After administration of adequate general  anesthesia. She was placed in the dorsolithotomy position with yello-fin stirrups and prepped and draped in the usual sterile fashion. The bladder was emptied. Examination under anesthesia revealed approximately 8 cm sized anteverted uterus. A weighted speculum was inserted into the posterior vaginal fornix. The anterior cervical lip was grasped with a single-tooth tenaculum. The uterine sound was used to measure the cervix and uterine cavity, measuring 4cm and 8cm respectively. The cervix was then dilated with Hegar dilators to size 8. Hysteroscopy was carried out revealing a normal endocervical and endometrial cavity. A sharp curettage was then carried out with endometrial curettings sent to pathology.     NovaSure endometrial ablation procedure was then performed, the device was introduced into the uterus. The uterine cavity length was entered on the instrument panel as 8cm. The array was deployed, and the width was registered as 3cm which was entered into the instrument panel also. The cavity assessment was successfully completed followed by initiation of the ablation cycle which was completed without difficulty in 2 minutes and 0 seconds at a power of 66 sarabia. The novasure device was then removed. Repeat hysteroscopy showed adequate ablation of the endometrium. All the vaginal instruments were removed and hemostasis at the site of the tenaculum was noted. Instrument, sponge, and needle counts were correct at the conclusion of the case. SCDs for DVT prophylaxis remain in place for the post operative period. Dr. Higinio Paris was present for the entire operation.     Findings:  Approximately 8 week sized anteverted uterus, normal appearing endometrium, right tubal ostia visualized   Estimated Blood Loss: Minimalml  Drains:  None  Total IV Fluids: 400ml  Urine output: 200 ml clear urine   Specimens:  Endometrial curretings  Instrument and Sponge Count: Correct  Complications: none  Condition: stable, transfer to post anesthesia recovery    Lily Stokes DO  Ob/Gyn Resident  2/7/2020, 10:42 AM

## 2020-02-07 NOTE — H&P
OB/GYN Pre-Op H&P  [address] [facility]    Patient Name: Antwan Stanford     Patient : 1980  Room/Bed: Room/bed info not found  Admission Date/Time: No admission date for patient encounter. Primary Care Physician: Estela Merlos  MRN: [id number]    Date: 2020  Time: 11:10 PM    The patient was seen in pre-op holding. She is here for hysteroscopy, dilation and curretage, novasure endometrial ablation. Antwan Stanford 37 y.o. was previously evaluated in the clinic for irregular menstrual bleeding. Patient reports spotting almost daily. She has tried progesterone with no relief of her symptoms. She has elected to proceed with endometrial ablation. She had a bilateral tubal ligation in . The procedure risks and complications were reviewed. The labs, Consent, and H&P were reviewed and updated. The patient was counseled on the possibility of  the need of a second surgery. The patient voiced understanding and had all of her questions answered. The possibility of incomplete removal of abnormal tissue was discussed. OBSTETRICAL HISTORY:   OB History    Para Term  AB Living   4 2 2 0 1 3   SAB TAB Ectopic Molar Multiple Live Births   1 0 0 0 0 3      # Outcome Date GA Lbr Kasi/2nd Weight Sex Delivery Anes PTL Lv   4  10/08/12 39w0d  6 lb 4 oz (2.835 kg) M CS-Unspec   GISSELL      Apgar1: 8  Apgar5: 9   3 Term 2008     CS-Unspec   GISSELL   2 Term 2004     CS-Unspec   GISSELL   1 SAB                PAST MEDICAL HISTORY:   has a past medical history of Dorsal back pain and 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[facility] [address] PAST SURGICAL HISTORY:   has a past surgical history that includes Tubal ligation (10/8/12);  section, low transverse (2004);  section, low transverse (2008); and  section, low transverse (10/2012).     ALLERGIES:  Allergies as of 2020    (No Known Allergies)       MEDICATIONS:  No current facility-administered medications for this rhonchi  CARDIOVASCULAR: Regular rate and rhythm, no murmurs rubs or gallops  ABDOMEN: soft, non tender, non distended, no rebound or guarding, no hernias, no hepatomegaly, no splenomegly  MUSCULOSKELETAL:  Equal strength bilaterally, normal muscle tone  SKIN: No abscess or rash  NEUROLOGIC:  Cranial nerves 2-12 grossly intact, no focal deficits  PSYCH: affect appropriate  Pelvic Exam: deferred to OR      LAB RESULTS:  Hospital Outpatient Visit on 02/05/2020   Component Date Value Ref Range Status    WBC 02/05/2020 6.6  3.5 - 11.3 k/uL Final    RBC 02/05/2020 3.90* 3.95 - 5.11 m/uL Final    Hemoglobin 02/05/2020 11.8* 11.9 - 15.1 g/dL Final    Hematocrit 02/05/2020 36.3  36.3 - 47.1 % Final    MCV 02/05/2020 93.1  82.6 - 102.9 fL Final    MCH 02/05/2020 30.3  25.2 - 33.5 pg Final    MCHC 02/05/2020 32.5  28.4 - 34.8 g/dL Final    RDW 02/05/2020 12.9  11.8 - 14.4 % Final    Platelets 65/94/6996 172  138 - 453 k/uL Final    MPV 02/05/2020 11.3  8.1 - 13.5 fL Final    NRBC Automated 02/05/2020 0.0  0.0 per 100 WBC Final    Color, UA 02/05/2020 YELLOW  YELLOW Final    Turbidity UA 02/05/2020 CLEAR  CLEAR Final    Glucose, Ur 02/05/2020 NEGATIVE  NEGATIVE Final    Bilirubin Urine 02/05/2020 NEGATIVE  NEGATIVE Final    Ketones, Urine 02/05/2020 NEGATIVE  NEGATIVE Final    Specific Gravity, UA 02/05/2020 1.007  1.005 - 1.030 Final    Urine Hgb 02/05/2020 MODERATE* NEGATIVE Final    pH, UA 02/05/2020 5.5  5.0 - 8.0 Final    Protein, UA 02/05/2020 NEGATIVE  NEGATIVE Final    Urobilinogen, Urine 02/05/2020 Normal  Normal Final    Nitrite, Urine 02/05/2020 NEGATIVE  NEGATIVE Final    Leukocyte Esterase, Urine 02/05/2020 NEGATIVE  NEGATIVE Final    Urinalysis Comments 02/05/2020 NOT REPORTED   Final    - 02/05/2020        Final    WBC, UA 02/05/2020 2 TO 5  0 - 5 /HPF Final    RBC, UA 02/05/2020 2 TO 5  0 - 4 /HPF Final    Reference range defined for non-centrifuged specimen.     Casts UA 02/05/2020 0

## 2020-02-10 LAB
HCG, PREGNANCY URINE (POC): NEGATIVE
SURGICAL PATHOLOGY REPORT: NORMAL

## 2020-02-11 ENCOUNTER — TELEPHONE (OUTPATIENT)
Dept: OBGYN CLINIC | Age: 40
End: 2020-02-11

## 2020-02-11 NOTE — TELEPHONE ENCOUNTER
OK to take calcium supplement and Vit D and if hgb was low, iron is fine.   Encourage fluids and rest.

## 2020-02-24 ENCOUNTER — OFFICE VISIT (OUTPATIENT)
Dept: OBGYN CLINIC | Age: 40
End: 2020-02-24
Payer: COMMERCIAL

## 2020-02-24 VITALS
SYSTOLIC BLOOD PRESSURE: 110 MMHG | WEIGHT: 103.8 LBS | HEIGHT: 62 IN | BODY MASS INDEX: 19.1 KG/M2 | DIASTOLIC BLOOD PRESSURE: 68 MMHG

## 2020-02-24 PROCEDURE — 99213 OFFICE O/P EST LOW 20 MIN: CPT | Performed by: OBSTETRICS & GYNECOLOGY

## 2020-08-20 ENCOUNTER — HOSPITAL ENCOUNTER (OUTPATIENT)
Age: 40
Setting detail: SPECIMEN
Discharge: HOME | End: 2020-08-20
Payer: COMMERCIAL

## 2020-08-20 ENCOUNTER — OFFICE VISIT (OUTPATIENT)
Dept: OBGYN CLINIC | Age: 40
End: 2020-08-20
Payer: COMMERCIAL

## 2020-08-20 VITALS
DIASTOLIC BLOOD PRESSURE: 78 MMHG | WEIGHT: 102.4 LBS | BODY MASS INDEX: 18.84 KG/M2 | SYSTOLIC BLOOD PRESSURE: 102 MMHG | HEIGHT: 62 IN

## 2020-08-20 PROCEDURE — 99396 PREV VISIT EST AGE 40-64: CPT | Performed by: NURSE PRACTITIONER

## 2020-08-20 ASSESSMENT — ENCOUNTER SYMPTOMS
ABDOMINAL PAIN: 0
BACK PAIN: 0
SHORTNESS OF BREATH: 0
ABDOMINAL DISTENTION: 0
COUGH: 0
DIARRHEA: 0
CONSTIPATION: 0

## 2020-08-20 NOTE — PROGRESS NOTES
DILATATION AND CURETTAGE HYSTEROSCOPY, Jhonatan Paula performed by Chetan Frausto MD at [address] [facility]  10/8/12     Family History   Problem Relation Age of Onset    High Blood Pressure Mother     High Blood Pressure Father      Social History     Tobacco Use    Smoking status: Never Smoker    Smokeless tobacco: Never Used   Substance Use Topics    Alcohol use: No        Subjective:      Review of Systems   Constitutional: Negative for appetite change and fatigue. HENT: Negative for congestion and hearing loss. Eyes: Negative for visual disturbance. Respiratory: Negative for cough and shortness of breath. Cardiovascular: Negative for chest pain and palpitations. Gastrointestinal: Negative for abdominal distention, abdominal pain, constipation and diarrhea. Genitourinary: Positive for vaginal discharge (w/itching). Negative for flank pain, frequency, menstrual problem (amenorrhea) and pelvic pain. Musculoskeletal: Negative for back pain. Neurological: Negative for syncope and headaches. Psychiatric/Behavioral: Negative for behavioral problems. Objective:     /78 (Site: Right Upper Arm, Position: Sitting, Cuff Size: Medium Adult)   Ht 5' 2\" (1.575 m)   Wt 102 lb 6.4 oz (46.4 kg)   LMP 02/01/2020   Breastfeeding No   BMI 18.73 kg/m²   Physical Exam  Constitutional:       Appearance: She is well-developed. Eyes:      Pupils: Pupils are equal, round, and reactive to light. Neck:      Thyroid: No thyromegaly. Trachea: No tracheal deviation. Cardiovascular:      Rate and Rhythm: Normal rate and regular rhythm. Heart sounds: Normal heart sounds. Pulmonary:      Effort: Pulmonary effort is normal. No respiratory distress. Breath sounds: Normal breath sounds. Chest:      Breasts: Breasts are symmetrical.         Right: No inverted nipple. Left: No inverted nipple, mass, nipple discharge, skin change or tenderness.    Abdominal:      General: Bowel sounds are normal. There is no distension. Palpations: Abdomen is soft. There is no mass. Tenderness: There is no abdominal tenderness. Hernia: There is no hernia in the left inguinal area. Genitourinary:     Labia:         Right: No rash or lesion. Left: No rash or lesion. Vagina: No vaginal discharge or tenderness. Cervix: No cervical motion tenderness, discharge or friability. Uterus: Not deviated, not enlarged and not fixed. Adnexa:         Right: No mass, tenderness or fullness. Left: No mass, tenderness or fullness. Musculoskeletal:         General: No tenderness. Lymphadenopathy:      Cervical: No cervical adenopathy. Skin:     General: Skin is warm and dry. Neurological:      Mental Status: She is alert and oriented to person, place, and time. Psychiatric:         Behavior: Behavior normal.         Thought Content: Thought content normal.         Judgment: Judgment normal.           Assessment:     1. Encounter for gynecological examination    2. Vaginal itching    3. Vaginal discharge          Plan:   1. Pap collected. Discussed new pap smear guidelines. Desires re-pap in 1 year. 2. Screening mammogram discussed and advised yearly if within normal limits. 3. Calcium and Vitamin D dosing reviewed. 4. Colonoscopy screening reviewed. 5. Bone density testing reviewed.    Affirm  Electronicallysigned by Aydee Borrego on 8/20/2020

## 2020-08-21 LAB
DIRECT EXAM: ABNORMAL
Lab: ABNORMAL
SPECIMEN DESCRIPTION: ABNORMAL

## 2020-08-21 RX ORDER — FLUCONAZOLE 150 MG/1
150 TABLET ORAL ONCE
Qty: 1 TABLET | Refills: 0 | Status: SHIPPED | OUTPATIENT
Start: 2020-08-21 | End: 2020-08-21

## 2020-08-27 LAB — CYTOLOGY REPORT: NORMAL

## 2020-09-04 ENCOUNTER — OFFICE VISIT (OUTPATIENT)
Dept: PRIMARY CARE CLINIC | Age: 40
End: 2020-09-04
Payer: COMMERCIAL

## 2020-09-04 VITALS
WEIGHT: 100.2 LBS | DIASTOLIC BLOOD PRESSURE: 70 MMHG | SYSTOLIC BLOOD PRESSURE: 120 MMHG | HEART RATE: 70 BPM | OXYGEN SATURATION: 100 % | BODY MASS INDEX: 18.44 KG/M2 | HEIGHT: 62 IN | RESPIRATION RATE: 15 BRPM

## 2020-09-04 PROCEDURE — 99204 OFFICE O/P NEW MOD 45 MIN: CPT | Performed by: INTERNAL MEDICINE

## 2020-09-04 RX ORDER — MIRTAZAPINE 15 MG/1
15 TABLET, FILM COATED ORAL NIGHTLY
Qty: 60 TABLET | Refills: 0 | Status: SHIPPED | OUTPATIENT
Start: 2020-09-04 | End: 2020-12-22

## 2020-09-04 ASSESSMENT — PATIENT HEALTH QUESTIONNAIRE - PHQ9
SUM OF ALL RESPONSES TO PHQ QUESTIONS 1-9: 0
2. FEELING DOWN, DEPRESSED OR HOPELESS: 0
SUM OF ALL RESPONSES TO PHQ QUESTIONS 1-9: 0
1. LITTLE INTEREST OR PLEASURE IN DOING THINGS: 0
SUM OF ALL RESPONSES TO PHQ9 QUESTIONS 1 & 2: 0

## 2020-09-10 ASSESSMENT — ENCOUNTER SYMPTOMS
BACK PAIN: 0
SHORTNESS OF BREATH: 0
COUGH: 0
DIARRHEA: 0
SINUS PAIN: 0
ABDOMINAL DISTENTION: 0
WHEEZING: 0
SINUS PRESSURE: 0
VOMITING: 0
NAUSEA: 0
ABDOMINAL PAIN: 0
CONSTIPATION: 0

## 2020-09-10 NOTE — PROGRESS NOTES
[facility] [place] PR[facility]  [facility] [address] [facility] [address]  [address] [address]  Dept: [phone]  Dept Fax: [phone]    Kathy Lynch is a 41 y.o. female who presents today for her medical conditions/complaints as noted below. Chief Complaint   Patient presents with   [facility] Patient     Establish Care. HPI:     This is a 68-year-old female who is here to establish care. She has past medical history of leiomyoma of uterus status post D&C, vitamin D deficiency, iron deficiency anemia, GERD, underweight and dysthymia. She has been underweight for a long time and she reports that she is unable to gain weight her BMI is at 18.3. Blood pressure is normal.  Currently she is only on iron tablets and what multivitamins. No other complaints or concerns. Discussed with her about getting blood work. She is also mildly depressed and discussed about starting her on Remeron. No other complaints or concerns.       Hemoglobin A1C (%)   Date Value   2018 5.0             ( goal A1C is < 7)   No results found for: LABMICR  LDL Calculated (mg/dL)   Date Value   2019 55       (goal LDL is <100)   AST (U/L)   Date Value   2019 15     ALT (U/L)   Date Value   2019 18     BUN (mg/dL)   Date Value   2019 11     BP Readings from Last 3 Encounters:   20 120/70   20 102/78   20 110/68          (goal 120/80)    Past Medical History:   Diagnosis Date    Dorsal back pain 2013    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HCC)     Thrombocytopenia ([facility] [address]     Vitamin D deficiency       Past Surgical History:   Procedure Laterality Date     SECTION, LOW TRANSVERSE  2004     SECTION, LOW TRANSVERSE  2008     SECTION, LOW TRANSVERSE  10/2012    DILATION AND CURETTAGE OF UTERUS  2020     DILATATION AND CURETTAGE HYSTEROSCOPY, NOVASURE    DILATION AND CURETTAGE OF UTERUS N/A 2020    DILATATION AND CURETTAGE HYSTEROSCOPY, Yesi Benavides performed by Eliana King MD at [address] [place] [address]  10/8/12       Family History   Problem Relation Age of Onset    High Blood Pressure Mother     High Blood Pressure Father        Social History     Tobacco Use    Smoking status: Never Smoker    Smokeless tobacco: Never Used   Substance Use Topics    Alcohol use: No      Current Outpatient Medications   Medication Sig Dispense Refill    mirtazapine (REMERON) 15 MG tablet Take 1 tablet by mouth nightly 60 tablet 0    ibuprofen (ADVIL;MOTRIN) 800 MG tablet Take 1 tablet by mouth every 8 hours as needed for Pain 30 tablet 0    ferrous sulfate (FE TABS) 325 (65 Fe) MG EC tablet Take 1 tablet by mouth every other day (Patient taking differently: Take 325 mg by mouth every other day Indications: PRN ) 90 tablet 0    Calcium Carbonate-Vitamin D (OYSTER SHELL CALCIUM/D) 500-200 MG-UNIT TABS TAKE ONE TABLET BY MOUTH ONCE DAILY (Patient taking differently: daily TAKE ONE TABLET BY MOUTH ONCE DAILY) 30 tablet 1    omeprazole (PRILOSEC) 20 MG delayed release capsule Take 1 capsule by mouth daily (Patient taking differently: Take 20 mg by mouth daily Indications: PRN ) 30 capsule 3    EQ EYE ITCH RELIEF 0.025 % ophthalmic solution       fluocinolone acetonide (SYNALAR) 0.01 % external solution        No current facility-administered medications for this visit. No Known Allergies    Health Maintenance   Topic Date Due    DTaP/Tdap/Td vaccine (1 - Tdap) 09/04/2021 (Originally 5/14/1997)    Flu vaccine (1) 09/04/2021 (Originally 9/1/2020)    Cervical cancer screen  08/20/2021    Lipid screen  06/11/2024    HIV screen  Completed    Hepatitis A vaccine  Aged Out    Hepatitis B vaccine  Aged Out    Hib vaccine  Aged Out    Meningococcal (ACWY) vaccine  Aged Out    Pneumococcal 0-64 years Vaccine  Aged Out       Subjective:      Review of Systems   Constitutional: Negative for activity change, appetite change, chills, fatigue and fever. HENT: Negative for congestion, ear pain, hearing loss, nosebleeds, sinus pressure, sinus pain and sneezing. Eyes: Negative for visual disturbance. Respiratory: Negative for cough, shortness of breath and wheezing. Cardiovascular: Negative for chest pain and palpitations. Gastrointestinal: Negative for abdominal distention, abdominal pain, constipation, diarrhea, nausea and vomiting. Endocrine: Negative for cold intolerance, heat intolerance, polydipsia, polyphagia and polyuria. Genitourinary: Negative for difficulty urinating, menstrual problem, vaginal bleeding and vaginal discharge. Musculoskeletal: Negative for back pain and joint swelling. Skin: Negative for rash. Neurological: Negative for numbness and headaches. Psychiatric/Behavioral: Negative for sleep disturbance. The patient is not nervous/anxious. All other systems reviewed and are negative. Objective:     Physical Exam  Vitals signs and nursing note reviewed. Constitutional:       General: She is not in acute distress. Appearance: She is well-developed. She is not diaphoretic. HENT:      Head: Normocephalic and atraumatic. Right Ear: Hearing normal.      Left Ear: Hearing normal.      Mouth/Throat:      Pharynx: Uvula midline. Eyes:      General: No scleral icterus. Conjunctiva/sclera: Conjunctivae normal.      Pupils: Pupils are equal, round, and reactive to light. Neck:      Musculoskeletal: Full passive range of motion without pain, normal range of motion and neck supple. Thyroid: No thyroid mass or thyromegaly. Vascular: No JVD. Trachea: Phonation normal.   Cardiovascular:      Rate and Rhythm: Normal rate and regular rhythm. Pulses: No decreased pulses. Carotid pulses are 2+ on the right side and 2+ on the left side. Radial pulses are 2+ on the right side and 2+ on the left side. Heart sounds: Normal heart sounds. No murmur.    Pulmonary:      Effort: Pulmonary effort is normal. No accessory muscle usage or respiratory distress. Breath sounds: Normal breath sounds. No wheezing or rales. Abdominal:      General: Bowel sounds are normal. There is no distension. Palpations: Abdomen is soft. Tenderness: There is no abdominal tenderness. Musculoskeletal: Normal range of motion. General: No deformity. Lymphadenopathy:      Cervical: No cervical adenopathy. Skin:     General: Skin is warm. Capillary Refill: Capillary refill takes less than 2 seconds. Findings: No rash. Neurological:      Mental Status: She is alert and oriented to person, place, and time. Deep Tendon Reflexes: Reflexes normal.   Psychiatric:         Behavior: Behavior normal.       /70   Pulse 70   Resp 15   Ht 5' 2\" (1.575 m)   Wt 100 lb 3.2 oz (45.5 kg)   SpO2 100%   BMI 18.33 kg/m²     Assessment:          1. Encounter to establish care with new doctor      2. Intramural leiomyoma of uterus  Status post D&C    3. Thrombocytopenia ([address]  monitor    4. Vitamin D deficiency  supplementation    5. Iron deficiency anemia, unspecified iron deficiency anemia type  On ferrous sulphate    6. Gastroesophageal reflux disease, esophagitis presence not specified  Well controlled    7. Underweight    - mirtazapine (REMERON) 15 MG tablet; Take 1 tablet by mouth nightly  Dispense: 60 tablet; Refill: 0    8. Dysthymia    - mirtazapine (REMERON) 15 MG tablet; Take 1 tablet by mouth nightly  Dispense: 60 tablet; Refill: 0            Diagnosis Orders   1. Encounter to establish care with new doctor     2. Intramural leiomyoma of uterus     3. Thrombocytopenia ([address]     4. Vitamin D deficiency     5. Iron deficiency anemia, unspecified iron deficiency anemia type     6. Gastroesophageal reflux disease, esophagitis presence not specified     7. Underweight  mirtazapine (REMERON) 15 MG tablet   8.  Dysthymia  mirtazapine (REMERON) 15 MG tablet           Plan: Return in about 2 weeks (around 9/18/2020) for Routine follow-up. No orders of the defined types were placed in this encounter. Orders Placed This Encounter   Medications    mirtazapine (REMERON) 15 MG tablet     Sig: Take 1 tablet by mouth nightly     Dispense:  60 tablet     Refill:  0         Patient given educational materials - see patient instructions. Discussed use, benefit, and side effects of prescribedmedications. All patient questions answered. Pt voiced understanding. Reviewed health maintenance. Instructed to continue current medications, diet and exercise. Patient agreed with treatment plan. Follow up as directed. I spent a total of 45 minutes face to face with this patient. Over 50% of that time was spent on counseling and care coordination. Please see assessment and plan for details. Electronically signed by Swetha Borjas MD on 9/10/2020 at 4:48 PM      Please note that this chart was generated using voice recognition Dragon dictation software. Although every effort was made to ensure the accuracy of this automatedtranscription, some errors in transcription may have occurred.

## 2020-09-24 ENCOUNTER — HOSPITAL ENCOUNTER (OUTPATIENT)
Age: 40
Setting detail: SPECIMEN
Discharge: HOME | End: 2020-09-24
Payer: COMMERCIAL

## 2020-09-24 ENCOUNTER — OFFICE VISIT (OUTPATIENT)
Dept: PRIMARY CARE CLINIC | Age: 40
End: 2020-09-24
Payer: COMMERCIAL

## 2020-09-24 VITALS
BODY MASS INDEX: 18.26 KG/M2 | DIASTOLIC BLOOD PRESSURE: 62 MMHG | HEART RATE: 77 BPM | TEMPERATURE: 98 F | HEIGHT: 62 IN | OXYGEN SATURATION: 100 % | WEIGHT: 99.2 LBS | RESPIRATION RATE: 15 BRPM | SYSTOLIC BLOOD PRESSURE: 110 MMHG

## 2020-09-24 LAB
ABSOLUTE EOS #: 0.23 K/UL (ref 0–0.44)
ABSOLUTE IMMATURE GRANULOCYTE: <0.03 K/UL (ref 0–0.3)
ABSOLUTE LYMPH #: 2.02 K/UL (ref 1.1–3.7)
ABSOLUTE MONO #: 0.61 K/UL (ref 0.1–1.2)
ALBUMIN SERPL-MCNC: 4.4 G/DL (ref 3.5–5.2)
ALBUMIN/GLOBULIN RATIO: 1.8 (ref 1–2.5)
ALP BLD-CCNC: 49 U/L (ref 35–104)
ALT SERPL-CCNC: 10 U/L (ref 5–33)
ANION GAP SERPL CALCULATED.3IONS-SCNC: 10 MMOL/L (ref 9–17)
AST SERPL-CCNC: 14 U/L
BASOPHILS # BLD: 0 % (ref 0–2)
BASOPHILS ABSOLUTE: 0.03 K/UL (ref 0–0.2)
BILIRUB SERPL-MCNC: 0.56 MG/DL (ref 0.3–1.2)
BUN BLDV-MCNC: 8 MG/DL (ref 6–20)
BUN/CREAT BLD: NORMAL (ref 9–20)
CALCIUM SERPL-MCNC: 9.1 MG/DL (ref 8.6–10.4)
CHLORIDE BLD-SCNC: 107 MMOL/L (ref 98–107)
CHOLESTEROL/HDL RATIO: 2.4
CHOLESTEROL: 127 MG/DL
CO2: 23 MMOL/L (ref 20–31)
CREAT SERPL-MCNC: 0.56 MG/DL (ref 0.5–0.9)
DIFFERENTIAL TYPE: ABNORMAL
EOSINOPHILS RELATIVE PERCENT: 3 % (ref 1–4)
FOLATE: 7.9 NG/ML
GFR AFRICAN AMERICAN: >60 ML/MIN
GFR NON-AFRICAN AMERICAN: >60 ML/MIN
GFR SERPL CREATININE-BSD FRML MDRD: NORMAL ML/MIN/{1.73_M2}
GFR SERPL CREATININE-BSD FRML MDRD: NORMAL ML/MIN/{1.73_M2}
GLUCOSE BLD-MCNC: 90 MG/DL (ref 70–99)
HCT VFR BLD CALC: 42.3 % (ref 36.3–47.1)
HDLC SERPL-MCNC: 52 MG/DL
HEMOGLOBIN: 13.9 G/DL (ref 11.9–15.1)
IMMATURE GRANULOCYTES: 0 %
LDL CHOLESTEROL: 64 MG/DL (ref 0–130)
LYMPHOCYTES # BLD: 27 % (ref 24–43)
MCH RBC QN AUTO: 30.9 PG (ref 25.2–33.5)
MCHC RBC AUTO-ENTMCNC: 32.9 G/DL (ref 28.4–34.8)
MCV RBC AUTO: 94 FL (ref 82.6–102.9)
MONOCYTES # BLD: 8 % (ref 3–12)
NRBC AUTOMATED: 0 PER 100 WBC
PDW BLD-RTO: 12.7 % (ref 11.8–14.4)
PLATELET # BLD: 129 K/UL (ref 138–453)
PLATELET ESTIMATE: ABNORMAL
PMV BLD AUTO: 12 FL (ref 8.1–13.5)
POTASSIUM SERPL-SCNC: 4.8 MMOL/L (ref 3.7–5.3)
RBC # BLD: 4.5 M/UL (ref 3.95–5.11)
RBC # BLD: ABNORMAL 10*6/UL
SEG NEUTROPHILS: 62 % (ref 36–65)
SEGMENTED NEUTROPHILS ABSOLUTE COUNT: 4.68 K/UL (ref 1.5–8.1)
SODIUM BLD-SCNC: 140 MMOL/L (ref 135–144)
TOTAL PROTEIN: 6.8 G/DL (ref 6.4–8.3)
TRIGL SERPL-MCNC: 55 MG/DL
TSH SERPL DL<=0.05 MIU/L-ACNC: 0.8 MIU/L (ref 0.3–5)
VITAMIN B-12: 776 PG/ML (ref 232–1245)
VITAMIN D 25-HYDROXY: 25.3 NG/ML (ref 30–100)
VLDLC SERPL CALC-MCNC: NORMAL MG/DL (ref 1–30)
WBC # BLD: 7.6 K/UL (ref 3.5–11.3)
WBC # BLD: ABNORMAL 10*3/UL

## 2020-09-24 PROCEDURE — 99386 PREV VISIT NEW AGE 40-64: CPT | Performed by: INTERNAL MEDICINE

## 2020-09-24 ASSESSMENT — ENCOUNTER SYMPTOMS
CONSTIPATION: 0
NAUSEA: 0
VOMITING: 0
WHEEZING: 0
SHORTNESS OF BREATH: 0
SINUS PRESSURE: 0
BACK PAIN: 0
DIARRHEA: 0
ABDOMINAL PAIN: 0
SINUS PAIN: 0
ABDOMINAL DISTENTION: 0
COUGH: 0

## 2020-09-24 NOTE — PROGRESS NOTES
ibuprofen (ADVIL;MOTRIN) 800 MG tablet Take 1 tablet by mouth every 8 hours as needed for Pain 30 tablet 0    ferrous sulfate (FE TABS) 325 (65 Fe) MG EC tablet Take 1 tablet by mouth every other day (Patient taking differently: Take 325 mg by mouth every other day Indications: PRN ) 90 tablet 0    Calcium Carbonate-Vitamin D (OYSTER SHELL CALCIUM/D) 500-200 MG-UNIT TABS TAKE ONE TABLET BY MOUTH ONCE DAILY (Patient taking differently: daily TAKE ONE TABLET BY MOUTH ONCE DAILY) 30 tablet 1    omeprazole (PRILOSEC) 20 MG delayed release capsule Take 1 capsule by mouth daily (Patient taking differently: Take 20 mg by mouth daily Indications: PRN ) 30 capsule 3    EQ EYE ITCH RELIEF 0.025 % ophthalmic solution       fluocinolone acetonide (SYNALAR) 0.01 % external solution        No current facility-administered medications for this visit. No Known Allergies    Health Maintenance   Topic Date Due    DTaP/Tdap/Td vaccine (1 - Tdap) 09/04/2021 (Originally 5/14/1997)    Flu vaccine (1) 09/04/2021 (Originally 9/1/2020)    Cervical cancer screen  08/20/2021    Lipid screen  06/11/2024    HIV screen  Completed    Hepatitis A vaccine  Aged Out    Hepatitis B vaccine  Aged Out    Hib vaccine  Aged Out    Meningococcal (ACWY) vaccine  Aged Out    Pneumococcal 0-64 years Vaccine  Aged Out       Subjective:      Review of Systems   Constitutional: Negative for activity change, appetite change, chills, fatigue and fever. HENT: Negative for congestion, ear pain, hearing loss, nosebleeds, sinus pressure, sinus pain and sneezing. Eyes: Negative for visual disturbance. Respiratory: Negative for cough, shortness of breath and wheezing. Cardiovascular: Negative for chest pain and palpitations. Gastrointestinal: Negative for abdominal distention, abdominal pain, constipation, diarrhea, nausea and vomiting. Endocrine: Negative for cold intolerance, heat intolerance, polydipsia, polyphagia and polyuria. Genitourinary: Negative for difficulty urinating, menstrual problem, vaginal bleeding and vaginal discharge. Musculoskeletal: Negative for back pain and joint swelling. Skin: Negative for rash. Neurological: Negative for numbness and headaches. Psychiatric/Behavioral: Negative for sleep disturbance. The patient is not nervous/anxious. All other systems reviewed and are negative. Objective:     Physical Exam  Vitals signs and nursing note reviewed. Constitutional:       General: She is not in acute distress. Appearance: She is well-developed. She is not diaphoretic. HENT:      Head: Normocephalic and atraumatic. Right Ear: Hearing normal.      Left Ear: Hearing normal.      Mouth/Throat:      Pharynx: Uvula midline. Eyes:      General: No scleral icterus. Conjunctiva/sclera: Conjunctivae normal.      Pupils: Pupils are equal, round, and reactive to light. Neck:      Musculoskeletal: Full passive range of motion without pain, normal range of motion and neck supple. Thyroid: No thyroid mass or thyromegaly. Vascular: No JVD. Trachea: Phonation normal.   Cardiovascular:      Rate and Rhythm: Normal rate and regular rhythm. Pulses: No decreased pulses. Carotid pulses are 2+ on the right side and 2+ on the left side. Radial pulses are 2+ on the right side and 2+ on the left side. Heart sounds: Normal heart sounds. No murmur. Pulmonary:      Effort: Pulmonary effort is normal. No accessory muscle usage or respiratory distress. Breath sounds: Normal breath sounds. No wheezing or rales. Abdominal:      General: Bowel sounds are normal. There is no distension. Palpations: Abdomen is soft. Tenderness: There is no abdominal tenderness. Musculoskeletal: Normal range of motion. General: No deformity. Lymphadenopathy:      Cervical: No cervical adenopathy. Skin:     General: Skin is warm.       Capillary Refill: Capillary refill takes less than 2 seconds. Findings: No rash. Neurological:      Mental Status: She is alert and oriented to person, place, and time. Deep Tendon Reflexes: Reflexes normal.   Psychiatric:         Behavior: Behavior normal.       /62   Pulse 77   Temp 98 °F (36.7 °C) (Temporal)   Resp 15   Ht 5' 2\" (1.575 m)   Wt 99 lb 3.2 oz (45 kg)   SpO2 100%   BMI 18.14 kg/m²     Assessment:          1. Annual physical exam    - Comprehensive Metabolic Panel; Future  - Lipid Panel; Future  - CBC With Auto Differential; Future  - Vitamin B12 & Folate; Future  - Vitamin D 25 Hydroxy; Future  - Hemoglobin A1C; Future  - TSH With Reflex Ft4; Future            Diagnosis Orders   1. Annual physical exam  Comprehensive Metabolic Panel    Lipid Panel    CBC With Auto Differential    Vitamin B12 & Folate    Vitamin D 25 Hydroxy    Hemoglobin A1C    TSH With Reflex Ft4           Plan:      Return in about 3 months (around 12/24/2020) for Routine follow-up. Orders Placed This Encounter   Procedures    Comprehensive Metabolic Panel     Standing Status:   Future     Standing Expiration Date:   9/24/2021    Lipid Panel     Standing Status:   Future     Standing Expiration Date:   9/24/2021     Order Specific Question:   Is Patient Fasting?/# of Hours     Answer:   8-10    CBC With Auto Differential     Standing Status:   Future     Standing Expiration Date:   9/24/2021    Vitamin B12 & Folate     Standing Status:   Future     Standing Expiration Date:   9/24/2021    Vitamin D 25 Hydroxy     Standing Status:   Future     Standing Expiration Date:   9/24/2021    Hemoglobin A1C     Standing Status:   Future     Standing Expiration Date:   9/24/2021    TSH With Reflex Ft4     Standing Status:   Future     Standing Expiration Date:   9/24/2021     No orders of the defined types were placed in this encounter. Patient given educational materials - see patient instructions.   Discussed use, benefit, and side effects of prescribedmedications. All patient questions answered. Pt voiced understanding. Reviewed health maintenance. Instructed to continue current medications, diet and exercise. Patient agreed with treatment plan. Follow up as directed. I spent a total of 30 minutes face to face with this patient. Over 50% of that time was spent on counseling and care coordination. Please see assessment and plan for details. Electronically signed by Alethea Layton MD on 9/24/2020 at 8:58 AM      Please note that this chart was generated using voice recognition Dragon dictation software. Although every effort was made to ensure the accuracy of this automatedtranscription, some errors in transcription may have occurred.

## 2020-09-29 LAB
ESTIMATED AVERAGE GLUCOSE: 105 MG/DL
HBA1C MFR BLD: 5.3 % (ref 4–6)

## 2020-10-06 RX ORDER — ERGOCALCIFEROL 1.25 MG/1
50000 CAPSULE ORAL WEEKLY
Qty: 12 CAPSULE | Refills: 1 | Status: SHIPPED | OUTPATIENT
Start: 2020-10-06 | End: 2021-04-29

## 2020-12-22 ENCOUNTER — OFFICE VISIT (OUTPATIENT)
Dept: PRIMARY CARE CLINIC | Age: 40
End: 2020-12-22
Payer: COMMERCIAL

## 2020-12-22 VITALS
HEART RATE: 84 BPM | WEIGHT: 101 LBS | TEMPERATURE: 97 F | SYSTOLIC BLOOD PRESSURE: 112 MMHG | BODY MASS INDEX: 18.47 KG/M2 | OXYGEN SATURATION: 100 % | DIASTOLIC BLOOD PRESSURE: 64 MMHG | RESPIRATION RATE: 15 BRPM

## 2020-12-22 PROCEDURE — 99214 OFFICE O/P EST MOD 30 MIN: CPT | Performed by: INTERNAL MEDICINE

## 2020-12-22 RX ORDER — KETOTIFEN FUMARATE 0.25 MG/ML
1 SOLUTION OPHTHALMIC 2 TIMES DAILY
Qty: 10 ML | Refills: 0 | Status: ON HOLD | OUTPATIENT
Start: 2020-12-22 | End: 2022-05-16 | Stop reason: ALTCHOICE

## 2020-12-22 RX ORDER — FLUOCINOLONE ACETONIDE 0.1 MG/ML
SOLUTION TOPICAL
Qty: 90 ML | Refills: 0 | Status: ON HOLD | OUTPATIENT
Start: 2020-12-22 | End: 2022-05-16 | Stop reason: ALTCHOICE

## 2020-12-26 ASSESSMENT — ENCOUNTER SYMPTOMS
SHORTNESS OF BREATH: 0
VOMITING: 0
DIARRHEA: 0
SINUS PAIN: 0
SINUS PRESSURE: 0
COUGH: 0
BACK PAIN: 0
NAUSEA: 0
ABDOMINAL PAIN: 0
CONSTIPATION: 0
ABDOMINAL DISTENTION: 0
WHEEZING: 0

## 2020-12-27 NOTE — PROGRESS NOTES
[facility] PRIMARY CARE  [facility] [address] [facility] [address]  [address] [address]  Dept: [phone]  Dept Fax: [phone]    Taylor Allen is a 41 y.o. female who presents today for her medical conditions/complaints as noted below. Chief Complaint   Patient presents with    Follow-up     Patient states she has \"no energy\"       HPI:     This is a 76-year-old female who is here for regular follow-up. Advised her to continue taking Remeron as she has not started taking it. Reviewed all her medications. She is due for mammogram.  Discussed in detail with the patient.       Hemoglobin A1C (%)   Date Value   2020 5.3   2018 5.0             ( goal A1C is < 7)   No results found for: LABMICR  LDL Cholesterol (mg/dL)   Date Value   2020 64     LDL Calculated (mg/dL)   Date Value   2019 55       (goal LDL is <100)   AST (U/L)   Date Value   2020 14     ALT (U/L)   Date Value   2020 10     BUN (mg/dL)   Date Value   2020 8     BP Readings from Last 3 Encounters:   20 112/64   20 110/62   20 120/70          (goal 120/80)    Past Medical History:   Diagnosis Date    Dorsal back pain 2013    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HCC)     Thrombocytopenia ([facility] [address]     Vitamin D deficiency       Past Surgical History:   Procedure Laterality Date     SECTION, LOW TRANSVERSE  2004     SECTION, LOW TRANSVERSE  2008     SECTION, LOW TRANSVERSE  10/2012    DILATION AND CURETTAGE OF UTERUS  2020     DILATATION AND CURETTAGE HYSTEROSCOPY, NOVASURE    DILATION AND CURETTAGE OF UTERUS N/A 2020    DILATATION AND CURETTAGE HYSTEROSCOPY, Luisana Railing performed by Rita Alexandra MD at [address] [place] [address]  10/8/12       Family History   Problem Relation Age of Onset    High Blood Pressure Mother     High Blood Pressure Father        Social History     Tobacco Use    Smoking status: Never Smoker  Smokeless tobacco: Never Used   Substance Use Topics    Alcohol use: No      Current Outpatient Medications   Medication Sig Dispense Refill    fluocinolone acetonide (SYNALAR) 0.01 % external solution Use as directed 90 mL 0    EQ EYE ITCH RELIEF 0.025 % ophthalmic solution Place 1 drop into both eyes 2 times daily 10 mL 0    vitamin D (ERGOCALCIFEROL) 1.25 MG (20230 UT) CAPS capsule Take 1 capsule by mouth once a week 12 capsule 1    ibuprofen (ADVIL;MOTRIN) 800 MG tablet Take 1 tablet by mouth every 8 hours as needed for Pain 30 tablet 0    ferrous sulfate (FE TABS) 325 (65 Fe) MG EC tablet Take 1 tablet by mouth every other day (Patient taking differently: Take 325 mg by mouth every other day Indications: PRN ) 90 tablet 0    Calcium Carbonate-Vitamin D (OYSTER SHELL CALCIUM/D) 500-200 MG-UNIT TABS TAKE ONE TABLET BY MOUTH ONCE DAILY (Patient taking differently: daily TAKE ONE TABLET BY MOUTH ONCE DAILY) 30 tablet 1    omeprazole (PRILOSEC) 20 MG delayed release capsule Take 1 capsule by mouth daily (Patient taking differently: Take 20 mg by mouth daily Indications: PRN ) 30 capsule 3     No current facility-administered medications for this visit. No Known Allergies    Health Maintenance   Topic Date Due    Hepatitis C screen  1980    DTaP/Tdap/Td vaccine (1 - Tdap) 09/04/2021 (Originally 5/14/1997)    Flu vaccine (1) 09/04/2021 (Originally 9/1/2020)    Cervical cancer screen  08/20/2021    Lipid screen  09/24/2025    HIV screen  Completed    Hepatitis A vaccine  Aged Out    Hepatitis B vaccine  Aged Out    Hib vaccine  Aged Out    Meningococcal (ACWY) vaccine  Aged Out    Pneumococcal 0-64 years Vaccine  Aged Out       Subjective:      Review of Systems   Constitutional: Negative for activity change, appetite change, chills, fatigue and fever. HENT: Negative for congestion, ear pain, hearing loss, nosebleeds, sinus pressure, sinus pain and sneezing. Eyes: Negative for visual disturbance. Respiratory: Negative for cough, shortness of breath and wheezing. Cardiovascular: Negative for chest pain and palpitations. Gastrointestinal: Negative for abdominal distention, abdominal pain, constipation, diarrhea, nausea and vomiting. Endocrine: Negative for cold intolerance, heat intolerance, polydipsia, polyphagia and polyuria. Genitourinary: Negative for difficulty urinating, menstrual problem, vaginal bleeding and vaginal discharge. Musculoskeletal: Negative for back pain and joint swelling. Skin: Negative for rash. Neurological: Negative for numbness and headaches. Psychiatric/Behavioral: Negative for sleep disturbance. The patient is not nervous/anxious. All other systems reviewed and are negative. Objective:     Physical Exam  Vitals signs and nursing note reviewed. Constitutional:       General: She is not in acute distress. Appearance: She is well-developed. She is not diaphoretic. HENT:      Head: Normocephalic and atraumatic. Right Ear: Hearing normal.      Left Ear: Hearing normal.      Mouth/Throat:      Pharynx: Uvula midline. Eyes:      General: No scleral icterus. Conjunctiva/sclera: Conjunctivae normal.      Pupils: Pupils are equal, round, and reactive to light. Neck:      Musculoskeletal: Full passive range of motion without pain, normal range of motion and neck supple. Thyroid: No thyroid mass or thyromegaly. Vascular: No JVD. Trachea: Phonation normal.   Cardiovascular:      Rate and Rhythm: Normal rate and regular rhythm. Pulses: No decreased pulses. Carotid pulses are 2+ on the right side and 2+ on the left side. Radial pulses are 2+ on the right side and 2+ on the left side. Heart sounds: Normal heart sounds. No murmur. Pulmonary:      Effort: Pulmonary effort is normal. No accessory muscle usage or respiratory distress. Breath sounds: Normal breath sounds. No wheezing or rales. Abdominal:      General: Bowel sounds are normal. There is no distension. Palpations: Abdomen is soft. Tenderness: There is no abdominal tenderness. Musculoskeletal: Normal range of motion. General: No deformity. Lymphadenopathy:      Cervical: No cervical adenopathy. Skin:     General: Skin is warm. Capillary Refill: Capillary refill takes less than 2 seconds. Findings: No rash. Neurological:      Mental Status: She is alert and oriented to person, place, and time. Deep Tendon Reflexes: Reflexes normal.   Psychiatric:         Behavior: Behavior normal.       /64   Pulse 84   Temp 97 °F (36.1 °C) (Temporal)   Resp 15   Wt 101 lb (45.8 kg)   SpO2 100%   BMI 18.47 kg/m²     Assessment:          1. Underweight  remeron    2. Gastroesophageal reflux disease, unspecified whether esophagitis present  prilosec    3. Vitamin D deficiency  Vit d supplements    4. Breast cancer screening by mammogram    - EMILY DIGITAL SCREEN W OR WO CAD BILATERAL; Future            Diagnosis Orders   1. Underweight     2. Gastroesophageal reflux disease, unspecified whether esophagitis present     3. Vitamin D deficiency     4. Breast cancer screening by mammogram  EMILY DIGITAL SCREEN W OR WO CAD BILATERAL           Plan:      Return in about 4 weeks (around 1/19/2021) for Routine follow-up.     Orders Placed This Encounter   Procedures    EMILY DIGITAL SCREEN W OR WO CAD BILATERAL     Standing Status:   Future     Standing Expiration Date:   12/22/2021     Order Specific Question:   Reason for exam:     Answer:   screening     Orders Placed This Encounter   Medications    fluocinolone acetonide (SYNALAR) 0.01 % external solution     Sig: Use as directed     Dispense:  90 mL     Refill:  0    EQ EYE ITCH RELIEF 0.025 % ophthalmic solution     Sig: Place 1 drop into both eyes 2 times daily     Dispense:  10 mL     Refill:  0 Patient given educational materials - see patient instructions. Discussed use, benefit, and side effects of prescribedmedications. All patient questions answered. Pt voiced understanding. Reviewed health maintenance. Instructed to continue current medications, diet and exercise. Patient agreed with treatment plan. Follow up as directed. I spent a total of 25 minutes face to face with this patient. Over 50% of that time was spent on counseling and care coordination. Please see assessment and plan for details. Electronically signed by Nicole Lowry MD on 12/26/2020 at 8:46 PM      Please note that this chart was generated using voice recognition Dragon dictation software. Although every effort was made to ensure the accuracy of this automatedtranscription, some errors in transcription may have occurred.

## 2021-01-13 ENCOUNTER — HOSPITAL ENCOUNTER (OUTPATIENT)
Dept: MAMMOGRAPHY | Age: 41
Discharge: HOME | End: 2021-01-15
Payer: COMMERCIAL

## 2021-01-13 DIAGNOSIS — Z12.31 BREAST CANCER SCREENING BY MAMMOGRAM: ICD-10-CM

## 2021-01-13 PROCEDURE — 77063 BREAST TOMOSYNTHESIS BI: CPT

## 2021-01-15 ENCOUNTER — HOSPITAL ENCOUNTER (OUTPATIENT)
Dept: WOMENS IMAGING | Age: 41
Discharge: HOME | End: 2021-01-17
Payer: COMMERCIAL

## 2021-01-15 DIAGNOSIS — R92.8 ABNORMAL MAMMOGRAM OF LEFT BREAST: ICD-10-CM

## 2021-01-15 DIAGNOSIS — R92.8 ABNORMAL MAMMOGRAM OF LEFT BREAST: Primary | ICD-10-CM

## 2021-01-15 DIAGNOSIS — R92.8 ABNORMAL MAMMOGRAM: ICD-10-CM

## 2021-01-15 PROCEDURE — 76642 ULTRASOUND BREAST LIMITED: CPT

## 2021-01-15 PROCEDURE — G0279 TOMOSYNTHESIS, MAMMO: HCPCS

## 2021-01-26 ENCOUNTER — OFFICE VISIT (OUTPATIENT)
Dept: PRIMARY CARE CLINIC | Age: 41
End: 2021-01-26
Payer: COMMERCIAL

## 2021-01-26 VITALS
HEART RATE: 80 BPM | DIASTOLIC BLOOD PRESSURE: 64 MMHG | WEIGHT: 102.2 LBS | BODY MASS INDEX: 18.69 KG/M2 | RESPIRATION RATE: 16 BRPM | TEMPERATURE: 97.2 F | SYSTOLIC BLOOD PRESSURE: 100 MMHG | OXYGEN SATURATION: 100 %

## 2021-01-26 DIAGNOSIS — R63.6 UNDERWEIGHT: Primary | ICD-10-CM

## 2021-01-26 PROCEDURE — 99213 OFFICE O/P EST LOW 20 MIN: CPT | Performed by: INTERNAL MEDICINE

## 2021-01-26 RX ORDER — MEGESTROL ACETATE 20 MG/1
20 TABLET ORAL DAILY
Qty: 30 TABLET | Refills: 3 | Status: SHIPPED | OUTPATIENT
Start: 2021-01-26 | End: 2021-08-25

## 2021-01-26 ASSESSMENT — PATIENT HEALTH QUESTIONNAIRE - PHQ9
SUM OF ALL RESPONSES TO PHQ QUESTIONS 1-9: 0
SUM OF ALL RESPONSES TO PHQ QUESTIONS 1-9: 0
SUM OF ALL RESPONSES TO PHQ9 QUESTIONS 1 & 2: 0
SUM OF ALL RESPONSES TO PHQ QUESTIONS 1-9: 0

## 2021-01-31 ASSESSMENT — ENCOUNTER SYMPTOMS
VOMITING: 0
DIARRHEA: 0
SINUS PRESSURE: 0
COUGH: 0
ABDOMINAL DISTENTION: 0
BACK PAIN: 0
NAUSEA: 0
WHEEZING: 0
ABDOMINAL PAIN: 0
SINUS PAIN: 0
CONSTIPATION: 0
SHORTNESS OF BREATH: 0

## 2021-02-01 NOTE — PROGRESS NOTES
[facility] PRIMARY CARE  [facility] [address] [facility] [address]  [address] [address]  Dept: [phone]  Dept Fax: [phone]    Kerry Castaneda is a 41 y.o. female who presents today for her medical conditions/complaints as noted below. Chief Complaint   Patient presents with    Follow-up       HPI:     Is a 41year-old female who is here for follow-up. She was started on Remeron and she has been feeling very sleepy since the start of the medication so she stopped taking it. Discussed about starting her on case. No other complaints or concerns.       Hemoglobin A1C (%)   Date Value   2020 5.3   2018 5.0             ( goal A1C is < 7)   No results found for: LABMICR  LDL Cholesterol (mg/dL)   Date Value   2020 64     LDL Calculated (mg/dL)   Date Value   2019 55       (goal LDL is <100)   AST (U/L)   Date Value   2020 14     ALT (U/L)   Date Value   2020 10     BUN (mg/dL)   Date Value   2020 8     BP Readings from Last 3 Encounters:   21 100/64   20 112/64   20 110/62          (goal 120/80)    Past Medical History:   Diagnosis Date    Dorsal back pain 2013    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HCC)     Thrombocytopenia ([facility] [address]     Vitamin D deficiency       Past Surgical History:   Procedure Laterality Date     SECTION, LOW TRANSVERSE  2004     SECTION, LOW TRANSVERSE  2008     SECTION, LOW TRANSVERSE  10/2012    DILATION AND CURETTAGE OF UTERUS  2020     DILATATION AND CURETTAGE HYSTEROSCOPY, NOVASURE    DILATION AND CURETTAGE OF UTERUS N/A 2020    DILATATION AND CURETTAGE HYSTEROSCOPY, Franklin Chucho performed by Bhumika Olmstead MD at [place] [address]  10/8/12       Family History   Problem Relation Age of Onset    High Blood Pressure Mother     High Blood Pressure Father        Social History     Tobacco Use    Smoking status: Never Smoker    Smokeless tobacco: Never Used Substance Use Topics    Alcohol use: No      Current Outpatient Medications   Medication Sig Dispense Refill    megestrol (MEGACE) 20 MG tablet Take 1 tablet by mouth daily 30 tablet 3    fluocinolone acetonide (SYNALAR) 0.01 % external solution Use as directed 90 mL 0    EQ EYE ITCH RELIEF 0.025 % ophthalmic solution Place 1 drop into both eyes 2 times daily 10 mL 0    vitamin D (ERGOCALCIFEROL) 1.25 MG (94945 UT) CAPS capsule Take 1 capsule by mouth once a week 12 capsule 1    ibuprofen (ADVIL;MOTRIN) 800 MG tablet Take 1 tablet by mouth every 8 hours as needed for Pain 30 tablet 0    ferrous sulfate (FE TABS) 325 (65 Fe) MG EC tablet Take 1 tablet by mouth every other day (Patient taking differently: Take 325 mg by mouth every other day Indications: PRN ) 90 tablet 0    omeprazole (PRILOSEC) 20 MG delayed release capsule Take 1 capsule by mouth daily (Patient taking differently: Take 20 mg by mouth daily Indications: PRN ) 30 capsule 3     No current facility-administered medications for this visit. No Known Allergies    Health Maintenance   Topic Date Due    Hepatitis C screen  1980    DTaP/Tdap/Td vaccine (1 - Tdap) 09/04/2021 (Originally 5/14/1997)    Flu vaccine (1) 09/04/2021 (Originally 9/1/2020)    Cervical cancer screen  08/20/2021    Lipid screen  09/24/2025    HIV screen  Completed    Hepatitis A vaccine  Aged Out    Hepatitis B vaccine  Aged Out    Hib vaccine  Aged Out    Meningococcal (ACWY) vaccine  Aged Out    Pneumococcal 0-64 years Vaccine  Aged Out       Subjective:      Review of Systems   Constitutional: Negative for activity change, appetite change, chills, fatigue and fever. HENT: Negative for congestion, ear pain, hearing loss, nosebleeds, sinus pressure, sinus pain and sneezing. Eyes: Negative for visual disturbance. Respiratory: Negative for cough, shortness of breath and wheezing. Cardiovascular: Negative for chest pain and palpitations. (MEGACE) 20 MG tablet; Take 1 tablet by mouth daily  Dispense: 30 tablet; Refill: 3            Diagnosis Orders   1. Underweight  megestrol (MEGACE) 20 MG tablet           Plan:      Return in about 2 months (around 3/26/2021). No orders of the defined types were placed in this encounter. Orders Placed This Encounter   Medications    megestrol (MEGACE) 20 MG tablet     Sig: Take 1 tablet by mouth daily     Dispense:  30 tablet     Refill:  3         Patient given educational materials - see patient instructions. Discussed use, benefit, and side effects of prescribedmedications. All patient questions answered. Pt voiced understanding. Reviewed health maintenance. Instructed to continue current medications, diet and exercise. Patient agreed with treatment plan. Follow up as directed. I spent a total of 15 minutes face to face with this patient. Over 50% of that time was spent on counseling and care coordination. Please see assessment and plan for details. Electronically signed by Anita Davidson MD on 1/31/2021 at 8:50 PM      Please note that this chart was generated using voice recognition Dragon dictation software. Although every effort was made to ensure the accuracy of this automatedtranscription, some errors in transcription may have occurred.

## 2021-04-08 ENCOUNTER — OFFICE VISIT (OUTPATIENT)
Dept: PRIMARY CARE CLINIC | Age: 41
End: 2021-04-08
Payer: COMMERCIAL

## 2021-04-08 VITALS
HEART RATE: 75 BPM | OXYGEN SATURATION: 99 % | SYSTOLIC BLOOD PRESSURE: 132 MMHG | RESPIRATION RATE: 16 BRPM | WEIGHT: 101.8 LBS | DIASTOLIC BLOOD PRESSURE: 66 MMHG | BODY MASS INDEX: 18.62 KG/M2

## 2021-04-08 DIAGNOSIS — E55.9 VITAMIN D DEFICIENCY: ICD-10-CM

## 2021-04-08 DIAGNOSIS — K21.9 GASTROESOPHAGEAL REFLUX DISEASE, UNSPECIFIED WHETHER ESOPHAGITIS PRESENT: Primary | ICD-10-CM

## 2021-04-08 DIAGNOSIS — R63.6 UNDERWEIGHT: ICD-10-CM

## 2021-04-08 PROCEDURE — 99213 OFFICE O/P EST LOW 20 MIN: CPT | Performed by: INTERNAL MEDICINE

## 2021-04-09 ENCOUNTER — IMMUNIZATION (OUTPATIENT)
Dept: PRIMARY CARE CLINIC | Age: 41
End: 2021-04-09
Payer: COMMERCIAL

## 2021-04-09 PROCEDURE — 91300 COVID-19, PFIZER VACCINE 30MCG/0.3ML DOSE: CPT | Performed by: INTERNAL MEDICINE

## 2021-04-09 PROCEDURE — 0001A COVID-19, PFIZER VACCINE 30MCG/0.3ML DOSE: CPT | Performed by: INTERNAL MEDICINE

## 2021-04-10 ASSESSMENT — ENCOUNTER SYMPTOMS
COUGH: 0
BACK PAIN: 0
NAUSEA: 0
ABDOMINAL DISTENTION: 0
DIARRHEA: 0
CONSTIPATION: 0
SHORTNESS OF BREATH: 0
WHEEZING: 0
SINUS PAIN: 0
SINUS PRESSURE: 0
VOMITING: 0
ABDOMINAL PAIN: 0

## 2021-04-11 NOTE — PROGRESS NOTES
Blood Pressure Father        Social History     Tobacco Use    Smoking status: Never Smoker    Smokeless tobacco: Never Used   Substance Use Topics    Alcohol use: No      Current Outpatient Medications   Medication Sig Dispense Refill    megestrol (MEGACE) 20 MG tablet Take 1 tablet by mouth daily 30 tablet 3    fluocinolone acetonide (SYNALAR) 0.01 % external solution Use as directed 90 mL 0    EQ EYE ITCH RELIEF 0.025 % ophthalmic solution Place 1 drop into both eyes 2 times daily 10 mL 0    vitamin D (ERGOCALCIFEROL) 1.25 MG (19116 UT) CAPS capsule Take 1 capsule by mouth once a week 12 capsule 1    ibuprofen (ADVIL;MOTRIN) 800 MG tablet Take 1 tablet by mouth every 8 hours as needed for Pain 30 tablet 0    ferrous sulfate (FE TABS) 325 (65 Fe) MG EC tablet Take 1 tablet by mouth every other day (Patient taking differently: Take 325 mg by mouth every other day Indications: PRN ) 90 tablet 0    omeprazole (PRILOSEC) 20 MG delayed release capsule Take 1 capsule by mouth daily (Patient taking differently: Take 20 mg by mouth daily Indications: PRN ) 30 capsule 3     No current facility-administered medications for this visit. No Known Allergies    Health Maintenance   Topic Date Due    Hepatitis C screen  Never done    DTaP/Tdap/Td vaccine (1 - Tdap) 09/04/2021 (Originally 5/14/1997)    Flu vaccine (Season Ended) 09/04/2021 (Originally 9/1/2021)    COVID-19 Vaccine (2 - Pfizer 2-dose series) 04/30/2021    Cervical cancer screen  08/20/2021    Lipid screen  09/24/2025    HIV screen  Completed    Hepatitis A vaccine  Aged Out    Hepatitis B vaccine  Aged Out    Hib vaccine  Aged Out    Meningococcal (ACWY) vaccine  Aged Out    Pneumococcal 0-64 years Vaccine  Aged Out       Subjective:      Review of Systems   Constitutional: Negative for activity change, appetite change, chills, fatigue and fever.    HENT: Negative for congestion, ear pain, hearing loss, nosebleeds, sinus pressure, sinus usage or respiratory distress. Breath sounds: Normal breath sounds. No wheezing or rales. Abdominal:      General: Bowel sounds are normal. There is no distension. Palpations: Abdomen is soft. Tenderness: There is no abdominal tenderness. Musculoskeletal: Normal range of motion. General: No deformity. Lymphadenopathy:      Cervical: No cervical adenopathy. Skin:     General: Skin is warm. Capillary Refill: Capillary refill takes less than 2 seconds. Findings: No rash. Neurological:      Mental Status: She is alert and oriented to person, place, and time. Deep Tendon Reflexes: Reflexes normal.   Psychiatric:         Behavior: Behavior normal.       /66   Pulse 75   Resp 16   Wt 101 lb 12.8 oz (46.2 kg)   SpO2 99%   BMI 18.62 kg/m²     Assessment:          1. Gastroesophageal reflux disease, unspecified whether esophagitis present  Prilosec    2. Vitamin D deficiency  Vitamin D supplements    3. Underweight  Megace            Diagnosis Orders   1. Gastroesophageal reflux disease, unspecified whether esophagitis present     2. Vitamin D deficiency     3. Underweight             Plan:      Return in about 6 months (around 10/8/2021) for Routine follow-up. No orders of the defined types were placed in this encounter. No orders of the defined types were placed in this encounter. Patient given educational materials - see patient instructions. Discussed use, benefit, and side effects of prescribedmedications. All patient questions answered. Pt voiced understanding. Reviewed health maintenance. Instructed to continue current medications, diet and exercise. Patient agreed with treatment plan. Follow up as directed. I spent a total of 15 minutes face to face with this patient. Over 50% of that time was spent on counseling and care coordination. Please see assessment and plan for details.       Electronically signed by Stanley Mccabe MD on

## 2021-04-29 RX ORDER — ERGOCALCIFEROL 1.25 MG/1
50000 CAPSULE ORAL WEEKLY
Qty: 12 CAPSULE | Refills: 0 | Status: SHIPPED | OUTPATIENT
Start: 2021-04-29 | Stop reason: SDUPTHER

## 2021-04-30 ENCOUNTER — IMMUNIZATION (OUTPATIENT)
Dept: PRIMARY CARE CLINIC | Age: 41
End: 2021-04-30
Payer: COMMERCIAL

## 2021-04-30 PROCEDURE — 91300 COVID-19, PFIZER VACCINE 30MCG/0.3ML DOSE: CPT | Performed by: INTERNAL MEDICINE

## 2021-04-30 PROCEDURE — 0002A COVID-19, PFIZER VACCINE 30MCG/0.3ML DOSE: CPT | Performed by: INTERNAL MEDICINE

## 2021-08-25 ENCOUNTER — OFFICE VISIT (OUTPATIENT)
Dept: PRIMARY CARE CLINIC | Age: 41
End: 2021-08-25
Payer: COMMERCIAL

## 2021-08-25 VITALS
HEIGHT: 62 IN | SYSTOLIC BLOOD PRESSURE: 120 MMHG | OXYGEN SATURATION: 99 % | HEART RATE: 69 BPM | RESPIRATION RATE: 15 BRPM | DIASTOLIC BLOOD PRESSURE: 84 MMHG | WEIGHT: 103.4 LBS | BODY MASS INDEX: 19.03 KG/M2

## 2021-08-25 DIAGNOSIS — N89.8 VAGINA ITCHING: ICD-10-CM

## 2021-08-25 DIAGNOSIS — Z13.29 SCREENING FOR THYROID DISORDER: ICD-10-CM

## 2021-08-25 DIAGNOSIS — E55.9 VITAMIN D DEFICIENCY: ICD-10-CM

## 2021-08-25 DIAGNOSIS — Z13.6 SCREENING FOR CARDIOVASCULAR CONDITION: ICD-10-CM

## 2021-08-25 DIAGNOSIS — Z13.0 SCREENING, ANEMIA, DEFICIENCY, IRON: ICD-10-CM

## 2021-08-25 DIAGNOSIS — J30.2 SEASONAL ALLERGIES: Primary | ICD-10-CM

## 2021-08-25 DIAGNOSIS — Z76.89 ENCOUNTER TO ESTABLISH CARE: ICD-10-CM

## 2021-08-25 DIAGNOSIS — Z13.1 SCREENING FOR DIABETES MELLITUS: ICD-10-CM

## 2021-08-25 PROBLEM — D25.9 UTERINE LEIOMYOMA: Status: RESOLVED | Noted: 2018-04-19 | Resolved: 2021-08-25

## 2021-08-25 PROCEDURE — 99214 OFFICE O/P EST MOD 30 MIN: CPT | Performed by: NURSE PRACTITIONER

## 2021-08-25 RX ORDER — LORATADINE 10 MG/1
10 TABLET ORAL DAILY
Qty: 30 TABLET | Refills: 2 | Status: SHIPPED | OUTPATIENT
Start: 2021-08-25

## 2021-08-25 RX ORDER — FLUCONAZOLE 150 MG/1
150 TABLET ORAL ONCE
Qty: 1 TABLET | Refills: 0 | Status: SHIPPED | OUTPATIENT
Start: 2021-08-25 | End: 2021-08-25

## 2021-08-25 SDOH — ECONOMIC STABILITY: FOOD INSECURITY: WITHIN THE PAST 12 MONTHS, THE FOOD YOU BOUGHT JUST DIDN'T LAST AND YOU DIDN'T HAVE MONEY TO GET MORE.: NEVER TRUE

## 2021-08-25 SDOH — ECONOMIC STABILITY: FOOD INSECURITY: WITHIN THE PAST 12 MONTHS, YOU WORRIED THAT YOUR FOOD WOULD RUN OUT BEFORE YOU GOT MONEY TO BUY MORE.: NEVER TRUE

## 2021-08-25 ASSESSMENT — ENCOUNTER SYMPTOMS
ABDOMINAL PAIN: 0
CHEST TIGHTNESS: 0
VOMITING: 0
NAUSEA: 0
SORE THROAT: 0
SHORTNESS OF BREATH: 0
RHINORRHEA: 0
SINUS PRESSURE: 1
COLOR CHANGE: 0
DIARRHEA: 0

## 2021-08-25 ASSESSMENT — PATIENT HEALTH QUESTIONNAIRE - PHQ9
2. FEELING DOWN, DEPRESSED OR HOPELESS: 0
1. LITTLE INTEREST OR PLEASURE IN DOING THINGS: 0
SUM OF ALL RESPONSES TO PHQ QUESTIONS 1-9: 0
SUM OF ALL RESPONSES TO PHQ QUESTIONS 1-9: 0
SUM OF ALL RESPONSES TO PHQ9 QUESTIONS 1 & 2: 0
SUM OF ALL RESPONSES TO PHQ QUESTIONS 1-9: 0

## 2021-08-25 ASSESSMENT — SOCIAL DETERMINANTS OF HEALTH (SDOH): HOW HARD IS IT FOR YOU TO PAY FOR THE VERY BASICS LIKE FOOD, HOUSING, MEDICAL CARE, AND HEATING?: NOT HARD AT ALL

## 2021-08-25 NOTE — PROGRESS NOTES
[facility] CARE  [facility] [address] [facility] [address]  [address] [address]  Dept: [phone]  Dept Fax: [phone]    Ramesh Johns is a 35 y.o. female who presentstoday for her medical conditions/complaints as noted below. Ramesh Johns is c/o of  Chief Complaint   Patient presents with    New Patient     Establish Care. Sneezing, Runny nose, Headache         HPI:     Here to establish as new patient, formerly followed with Dr. Linda Martinez  [facility] significant for vitamin D deficiency, currently on supplement  History of abnormal uterine bleeding and uterine Thayer, had D&C with ablation in  and symptoms have improved. SONJA-on oral iron supplement, no anemia on most recent labs. Has acute complaint of sinus pressure, HA and runny nose, does have history of intermittent allergy symptoms that includes itchy, watery eyes and runny nose, not currently treating with antihistamine. She denies fever, ear pain or sinus pain. Also has complaint of vaginal itching, notes this has been intermittent problem for her since her D&C and ablation. Has not tried over-the-counter medications. Would like to treat for yeast and return if symptoms persist or worsen.   No abdominal or pelvic pain, no abnormal vaginal discharge    No other concerns or complaints      Hemoglobin A1C (%)   Date Value   2020 5.3   2018 5.0             ( goal A1C is < 7)   No results found for: LABMICR  LDL Cholesterol (mg/dL)   Date Value   2020 64     LDL Calculated (mg/dL)   Date Value   2019 55       (goal LDL is <100)   AST (U/L)   Date Value   2020 14     ALT (U/L)   Date Value   2020 10     BUN (mg/dL)   Date Value   2020 8     BP Readings from Last 3 Encounters:   21 120/84   21 132/66   21 100/64          ([id number])    Past Medical History:   Diagnosis Date    Dorsal back pain 2013    DUB (dysfunctional uterine bleeding) 2013    Menorrhagia 10/20/2014    Other specified fetal and placental problems affecting management of mother, antepartum 2012    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[facility] [address]     Suspected placental problem not found 2012    Thrombocytopenia ([facility] [address]     Uterine leiomyoma 2018    Vitamin D deficiency       Past Surgical History:   Procedure Laterality Date     SECTION, LOW TRANSVERSE  2004     SECTION, LOW TRANSVERSE  2008     SECTION, LOW TRANSVERSE  10/2012    DILATION AND CURETTAGE OF UTERUS  2020     DILATATION AND CURETTAGE HYSTEROSCOPY, NOVASURE    DILATION AND CURETTAGE OF UTERUS N/A 2020    DILATATION AND CURETTAGE HYSTEROSCOPY, Chevis Edward performed by Fortino Valentin MD at [facility]  10/8/12       Family History   Problem Relation Age of Onset    High Blood Pressure Mother     High Blood Pressure Father        Social History     Tobacco Use    Smoking status: Never Smoker    Smokeless tobacco: Never Used   Substance Use Topics    Alcohol use: No      Current Outpatient Medications   Medication Sig Dispense Refill    loratadine (CLARITIN) 10 MG tablet Take 1 tablet by mouth daily 30 tablet 2    fluconazole (DIFLUCAN) 150 MG tablet Take 1 tablet by mouth once for 1 dose 1 tablet 0    vitamin D (ERGOCALCIFEROL) 1.25 MG (02505 UT) CAPS capsule Take 1 capsule by mouth once a week 12 capsule 0    fluocinolone acetonide (SYNALAR) 0.01 % external solution Use as directed 90 mL 0    EQ EYE ITCH RELIEF 0.025 % ophthalmic solution Place 1 drop into both eyes 2 times daily 10 mL 0    ibuprofen (ADVIL;MOTRIN) 800 MG tablet Take 1 tablet by mouth every 8 hours as needed for Pain 30 tablet 0    ferrous sulfate (FE TABS) 325 (65 Fe) MG EC tablet Take 1 tablet by mouth every other day (Patient taking differently: Take 325 mg by mouth every other day Indications: PRN ) 90 tablet 0    omeprazole (PRILOSEC) 20 MG delayed release capsule Take 1 capsule by mouth daily (Patient taking differently: Take 20 mg by mouth daily Indications: PRN ) 30 capsule 3     No current facility-administered medications for this visit. No Known Allergies    Health Maintenance   Topic Date Due    Cervical cancer screen  08/20/2021    DTaP/Tdap/Td vaccine (1 - Tdap) 09/04/2021 (Originally 5/14/1997)    Flu vaccine (1) 09/01/2021    Lipid screen  09/24/2025    COVID-19 Vaccine  Completed    HIV screen  Completed    Hepatitis A vaccine  Aged Out    Hepatitis B vaccine  Aged Out    Hib vaccine  Aged Out    Meningococcal (ACWY) vaccine  Aged Out    Pneumococcal 0-64 years Vaccine  Aged Out    Hepatitis C screen  Discontinued       Subjective:      Review of Systems   Constitutional: Negative for activity change, fatigue and fever. HENT: Positive for congestion and sinus pressure. Negative for rhinorrhea and sore throat. Eyes: Negative for visual disturbance. Respiratory: Negative for chest tightness and shortness of breath. Cardiovascular: Negative for chest pain and palpitations. Gastrointestinal: Negative for abdominal pain, diarrhea, nausea and vomiting. Endocrine: Negative for polydipsia. Genitourinary: Negative for difficulty urinating. Vaginal itching   Musculoskeletal: Negative for arthralgias and myalgias. Skin: Negative for color change. Neurological: Positive for headaches. Negative for weakness. Psychiatric/Behavioral: Negative for behavioral problems. The patient is not nervous/anxious. All other systems reviewed and are negative. Objective:   /84   Pulse 69   Resp 15   Ht 5' 2\" (1.575 m)   Wt 103 lb 6.4 oz (46.9 kg)   SpO2 99%   BMI 18.91 kg/m²   Physical Exam  Vitals reviewed. Constitutional:       General: She is not in acute distress. Appearance: Normal appearance. HENT:      Head: Normocephalic.       Right Ear: Tympanic membrane, ear canal and external ear normal.      Left Ear: Tympanic membrane, ear canal and external ear normal.      Nose:      Right Sinus: No maxillary sinus tenderness or frontal sinus tenderness. Left Sinus: No maxillary sinus tenderness or frontal sinus tenderness. Eyes:      Pupils: Pupils are equal, round, and reactive to light. Cardiovascular:      Rate and Rhythm: Normal rate and regular rhythm. Pulses: Normal pulses. Heart sounds: Normal heart sounds. Pulmonary:      Effort: Pulmonary effort is normal.      Breath sounds: Normal breath sounds. Abdominal:      General: There is no distension. Musculoskeletal:         General: Normal range of motion. Cervical back: Neck supple. Right lower leg: No edema. Left lower leg: No edema. Lymphadenopathy:      Cervical: No cervical adenopathy. Skin:     General: Skin is warm and dry. Capillary Refill: Capillary refill takes less than 2 seconds. Neurological:      General: No focal deficit present. Mental Status: She is alert and oriented to person, place, and time. Psychiatric:         Mood and Affect: Mood normal.         Behavior: Behavior normal.           :       Diagnosis Orders   1. Seasonal allergies  loratadine (CLARITIN) 10 MG tablet   2. Encounter to establish care     3. Vitamin D deficiency  Vitamin D 25 Hydroxy   4. Vagina itching  fluconazole (DIFLUCAN) 150 MG tablet   5. Screening, anemia, deficiency, iron  CBC Auto Differential   6. Screening for diabetes mellitus  Comprehensive Metabolic Panel   7. Screening for cardiovascular condition  Lipid Panel   8. Screening for thyroid disorder  TSH without Reflex             :          1. Encounter to establish care  Screening labs ordered. Had mammogram January 2021-showed dense breast tissue and asymmetry in left breast followed up with ultrasound that suggested cyst in 2 o'clock position. Normal Pap in 2020.     2. Seasonal allergies  Waxing and waning, trial Claritin daily.   OTC ibuprofen or Tylenol as needed for sinus pain and headache.  - loratadine (CLARITIN) 10 MG tablet; Take 1 tablet by mouth daily  Dispense: 30 tablet; Refill: 2    3. Vitamin D deficiency  Low on most recent lab, on weekly supplement. Recheck labs. - Vitamin D 25 Hydroxy; Future    4. Vagina itching  New, Diflucan once. If symptoms persist, advised to call and may repeat dose in 2 to 3 days. Recommended pelvic exam and vaginitis swab if symptoms remain. May also consider following with GYN    - fluconazole (DIFLUCAN) 150 MG tablet; Take 1 tablet by mouth once for 1 dose  Dispense: 1 tablet; Refill: 0    Return in about 6 months (around 2/25/2022). Patient given educational materials - see patient instructions. Discussed use, benefit, and side effects of prescribed medications. All patient questions answered. Pt voiced understanding. Reviewed health maintenance. Instructed to continue current medications, diet and exercise. Patient agreed with treatment plan. Follow up as directed.        Electronicallysigned by RADHA Wadsworth CNP on 8/25/2021 at 8:25 PM

## 2021-09-10 ENCOUNTER — HOSPITAL ENCOUNTER (OUTPATIENT)
Age: 41
Setting detail: SPECIMEN
Discharge: HOME | End: 2021-09-10
Payer: COMMERCIAL

## 2021-09-10 DIAGNOSIS — Z13.1 SCREENING FOR DIABETES MELLITUS: ICD-10-CM

## 2021-09-10 DIAGNOSIS — Z13.0 SCREENING, ANEMIA, DEFICIENCY, IRON: ICD-10-CM

## 2021-09-10 DIAGNOSIS — Z13.29 SCREENING FOR THYROID DISORDER: ICD-10-CM

## 2021-09-10 DIAGNOSIS — Z13.6 SCREENING FOR CARDIOVASCULAR CONDITION: ICD-10-CM

## 2021-09-10 DIAGNOSIS — E55.9 VITAMIN D DEFICIENCY: ICD-10-CM

## 2021-09-10 LAB
ABSOLUTE EOS #: 0.28 K/UL (ref 0–0.44)
ABSOLUTE IMMATURE GRANULOCYTE: 0.04 K/UL (ref 0–0.3)
ABSOLUTE LYMPH #: 1.88 K/UL (ref 1.1–3.7)
ABSOLUTE MONO #: 0.59 K/UL (ref 0.1–1.2)
ALBUMIN SERPL-MCNC: 4.2 G/DL (ref 3.5–5.2)
ALBUMIN/GLOBULIN RATIO: 1.8 (ref 1–2.5)
ALP BLD-CCNC: 55 U/L (ref 35–104)
ALT SERPL-CCNC: 15 U/L (ref 5–33)
ANION GAP SERPL CALCULATED.3IONS-SCNC: 8 MMOL/L (ref 9–17)
AST SERPL-CCNC: 16 U/L
BASOPHILS # BLD: 0 % (ref 0–2)
BASOPHILS ABSOLUTE: <0.03 K/UL (ref 0–0.2)
BILIRUB SERPL-MCNC: 0.58 MG/DL (ref 0.3–1.2)
BUN BLDV-MCNC: 6 MG/DL (ref 6–20)
BUN/CREAT BLD: ABNORMAL (ref 9–20)
CALCIUM SERPL-MCNC: 8.9 MG/DL (ref 8.6–10.4)
CHLORIDE BLD-SCNC: 108 MMOL/L (ref 98–107)
CHOLESTEROL/HDL RATIO: 2.3
CHOLESTEROL: 129 MG/DL
CO2: 23 MMOL/L (ref 20–31)
CREAT SERPL-MCNC: 0.53 MG/DL (ref 0.5–0.9)
DIFFERENTIAL TYPE: ABNORMAL
EOSINOPHILS RELATIVE PERCENT: 4 % (ref 1–4)
GFR AFRICAN AMERICAN: >60 ML/MIN
GFR NON-AFRICAN AMERICAN: >60 ML/MIN
GFR SERPL CREATININE-BSD FRML MDRD: ABNORMAL ML/MIN/{1.73_M2}
GFR SERPL CREATININE-BSD FRML MDRD: ABNORMAL ML/MIN/{1.73_M2}
GLUCOSE BLD-MCNC: 87 MG/DL (ref 70–99)
HCT VFR BLD CALC: 39.1 % (ref 36.3–47.1)
HDLC SERPL-MCNC: 57 MG/DL
HEMOGLOBIN: 12.5 G/DL (ref 11.9–15.1)
IMMATURE GRANULOCYTES: 1 %
LDL CHOLESTEROL: 63 MG/DL (ref 0–130)
LYMPHOCYTES # BLD: 24 % (ref 24–43)
MCH RBC QN AUTO: 30.1 PG (ref 25.2–33.5)
MCHC RBC AUTO-ENTMCNC: 32 G/DL (ref 28.4–34.8)
MCV RBC AUTO: 94.2 FL (ref 82.6–102.9)
MONOCYTES # BLD: 8 % (ref 3–12)
NRBC AUTOMATED: 0 PER 100 WBC
PDW BLD-RTO: 13.2 % (ref 11.8–14.4)
PLATELET # BLD: 117 K/UL (ref 138–453)
PLATELET ESTIMATE: ABNORMAL
PMV BLD AUTO: 12.2 FL (ref 8.1–13.5)
POTASSIUM SERPL-SCNC: 4.9 MMOL/L (ref 3.7–5.3)
RBC # BLD: 4.15 M/UL (ref 3.95–5.11)
RBC # BLD: ABNORMAL 10*6/UL
SEG NEUTROPHILS: 63 % (ref 36–65)
SEGMENTED NEUTROPHILS ABSOLUTE COUNT: 5 K/UL (ref 1.5–8.1)
SODIUM BLD-SCNC: 139 MMOL/L (ref 135–144)
TOTAL PROTEIN: 6.6 G/DL (ref 6.4–8.3)
TRIGL SERPL-MCNC: 47 MG/DL
TSH SERPL DL<=0.05 MIU/L-ACNC: 0.6 MIU/L (ref 0.3–5)
VITAMIN D 25-HYDROXY: 33.5 NG/ML (ref 30–100)
VLDLC SERPL CALC-MCNC: NORMAL MG/DL (ref 1–30)
WBC # BLD: 7.8 K/UL (ref 3.5–11.3)
WBC # BLD: ABNORMAL 10*3/UL

## 2021-09-14 DIAGNOSIS — D69.6 THROMBOCYTOPENIA (HCC): Primary | ICD-10-CM

## 2021-09-16 ENCOUNTER — TELEPHONE (OUTPATIENT)
Dept: ONCOLOGY | Age: 41
End: 2021-09-16

## 2021-09-16 NOTE — TELEPHONE ENCOUNTER
Procedure:  AMB REFERRAL TO HEMATOLOGY ONCOLOGY   Associated diagnosis: D69.6 (ICD-10-CM) - Thrombocytopenia ([facility] [address]   Date: 9/14/2021   Provider: RADHA Moralez CNP   Department: [facility] 9/16/21 to setup consult

## 2021-09-20 ENCOUNTER — HOSPITAL ENCOUNTER (OUTPATIENT)
Age: 41
Setting detail: SPECIMEN
Discharge: HOME | End: 2021-09-20
Payer: COMMERCIAL

## 2021-09-20 ENCOUNTER — OFFICE VISIT (OUTPATIENT)
Dept: PRIMARY CARE CLINIC | Age: 41
End: 2021-09-20
Payer: COMMERCIAL

## 2021-09-20 VITALS
BODY MASS INDEX: 19.06 KG/M2 | DIASTOLIC BLOOD PRESSURE: 64 MMHG | OXYGEN SATURATION: 99 % | SYSTOLIC BLOOD PRESSURE: 128 MMHG | HEART RATE: 67 BPM | WEIGHT: 104.2 LBS

## 2021-09-20 DIAGNOSIS — N89.8 VAGINA ITCHING: ICD-10-CM

## 2021-09-20 DIAGNOSIS — N89.8 VAGINAL DISCHARGE: ICD-10-CM

## 2021-09-20 DIAGNOSIS — N89.8 VAGINA ITCHING: Primary | ICD-10-CM

## 2021-09-20 DIAGNOSIS — J30.2 SEASONAL ALLERGIC RHINITIS, UNSPECIFIED TRIGGER: ICD-10-CM

## 2021-09-20 PROCEDURE — 99214 OFFICE O/P EST MOD 30 MIN: CPT | Performed by: NURSE PRACTITIONER

## 2021-09-20 RX ORDER — FLUCONAZOLE 150 MG/1
TABLET ORAL
COMMUNITY
Start: 2021-08-25 | End: 2022-01-26 | Stop reason: ALTCHOICE

## 2021-09-20 RX ORDER — FLUTICASONE PROPIONATE 50 MCG
2 SPRAY, SUSPENSION (ML) NASAL DAILY
Qty: 16 G | Refills: 3 | Status: SHIPPED | OUTPATIENT
Start: 2021-09-20

## 2021-09-20 RX ORDER — DIPHENHYDRAMINE HCL 25 MG
25 CAPSULE ORAL NIGHTLY PRN
Qty: 30 CAPSULE | Refills: 0 | Status: SHIPPED | OUTPATIENT
Start: 2021-09-20 | End: 2021-09-30

## 2021-09-20 RX ORDER — METRONIDAZOLE 7.5 MG/G
1 GEL VAGINAL DAILY
Qty: 70 G | Refills: 0 | Status: SHIPPED | OUTPATIENT
Start: 2021-09-20 | End: 2021-09-25

## 2021-09-20 ASSESSMENT — ENCOUNTER SYMPTOMS
DIARRHEA: 0
SHORTNESS OF BREATH: 0
SORE THROAT: 0
VOMITING: 0
COLOR CHANGE: 0
ABDOMINAL PAIN: 0
NAUSEA: 0
CHEST TIGHTNESS: 0
RHINORRHEA: 0

## 2021-09-20 NOTE — PATIENT INSTRUCTIONS
Patient Education        Vaginal Yeast Infection: Care Instructions  Overview     A vaginal yeast infection is the growth of too many yeast cells in the vagina. This is common in women of all ages. Itching, vaginal discharge and irritation, and other symptoms can bother you. But yeast infections don't often cause other health problems. Some medicines can increase your risk of getting a yeast infection. These include antibiotics, birth control pills, hormones, and steroids. You may also be more likely to get a yeast infection if you are pregnant, have diabetes, douche, or wear tight clothes. With treatment, most yeast infections get better in 2 to 3 days. Follow-up care is a key part of your treatment and safety. Be sure to make and go to all appointments, and call your doctor if you are having problems. It's also a good idea to know your test results and keep a list of the medicines you take. How can you care for yourself at home? · Take your medicines exactly as prescribed. Call your doctor if you think you are having a problem with your medicine. · Ask your doctor about over-the-counter (OTC) medicines for yeast infections. They may cost less than prescription medicines. If you use an OTC treatment, read and follow all instructions on the label. · Don't use tampons while using a vaginal cream or suppository. The tampons can absorb the medicine. Use pads instead. · Wear loose cotton clothing. Don't wear nylon or other fabric that holds body heat and moisture close to the skin. · Try sleeping without underwear. · Don't scratch. Relieve itching with a cold pack or a cool bath. · Don't wash your vaginal area more than once a day. Use plain water or a mild, unscented soap. Air-dry the vaginal area. · Change out of wet swimsuits after swimming. · If you are using a vaginal medicine, don't have sex until you have finished your treatment.  But if you do have sex, don't depend on a condom or diaphragm for birth

## 2021-09-20 NOTE — PROGRESS NOTES
[facility] PRIMARY CARE  [address] [facility] [address]  [address] [address]  Dept: [phone]  Dept Fax: [phone]    Teetee Jacobs is a 35 y.o. female who presentstoday for her medical conditions/complaints as noted below. Teetee Jacobs is c/o of  Chief Complaint   Patient presents with    Vaginal Itching         HPI:     Here with complaint of vaginal itching ever since [facility]  last February, it comes and goes.   She denies vaginal discharge or drainage, notes that itching tends to be on outside of labia, she does not shave or trim pubic region  Has used diflucan and monistat with some relief of itching in past but does not feel it completely resolves, PAP last year WNL  Is lebron-menopausal, still having menses but they are irregular and she does skip some months  Denies vaginal or abdominal pain, no fever/chills or other associated symptoms     Has intermittent runny nose from allergies, would like to try nasal spray      Hemoglobin A1C (%)   Date Value   2020 5.3   2018 5.0             ( goal A1C is < 7)   No results found for: LABMICR  LDL Cholesterol (mg/dL)   Date Value   09/10/2021 63   2020 64     LDL Calculated (mg/dL)   Date Value   2019 55       (goal LDL is <100)   AST (U/L)   Date Value   09/10/2021 16     ALT (U/L)   Date Value   09/10/2021 15     BUN (mg/dL)   Date Value   09/10/2021 6     BP Readings from Last 3 Encounters:   21 128/64   21 120/84   21 132/66          (tply222/80)    Past Medical History:   Diagnosis Date    Dorsal back pain 2013    DUB (dysfunctional uterine bleeding) 2013    Menorrhagia 10/20/2014    Other specified fetal and placental problems affecting management of mother, antepartum 2012    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[address]     Suspected placental problem not found 2012    Thrombocytopenia ([address]     Uterine leiomyoma 2018    Vitamin D deficiency       Past Surgical History: Procedure Laterality Date     SECTION, LOW TRANSVERSE  2004     SECTION, LOW TRANSVERSE  2008     SECTION, LOW TRANSVERSE  10/2012    DILATION AND CURETTAGE OF UTERUS  2020     DILATATION AND CURETTAGE HYSTEROSCOPY, NOVASURE    DILATION AND CURETTAGE OF UTERUS N/A 2020    DILATATION AND CURETTAGE HYSTEROSCOPY, NOVASURE performed by Sagrario Constantino MD at [address] [place] [address]  10/8/12       Family History   Problem Relation Age of Onset    High Blood Pressure Mother     High Blood Pressure Father        Social History     Tobacco Use    Smoking status: Never Smoker    Smokeless tobacco: Never Used   Substance Use Topics    Alcohol use: No      Current Outpatient Medications   Medication Sig Dispense Refill    metroNIDAZOLE (METROGEL) 0.75 % vaginal gel Place 1 Applicatorful vaginally daily for 5 days 70 g 0    diphenhydrAMINE (BENADRYL) 25 MG capsule Take 1 capsule by mouth nightly as needed for Itching 30 capsule 0    fluticasone (FLONASE) 50 MCG/ACT nasal spray 2 sprays by Each Nostril route daily 16 g 3    loratadine (CLARITIN) 10 MG tablet Take 1 tablet by mouth daily 30 tablet 2    vitamin D (ERGOCALCIFEROL) 1.25 MG (02047 UT) CAPS capsule Take 1 capsule by mouth once a week 12 capsule 0    fluocinolone acetonide (SYNALAR) 0.01 % external solution Use as directed 90 mL 0    EQ EYE ITCH RELIEF 0.025 % ophthalmic solution Place 1 drop into both eyes 2 times daily 10 mL 0    ibuprofen (ADVIL;MOTRIN) 800 MG tablet Take 1 tablet by mouth every 8 hours as needed for Pain 30 tablet 0    ferrous sulfate (FE TABS) 325 (65 Fe) MG EC tablet Take 1 tablet by mouth every other day (Patient taking differently: Take 325 mg by mouth every other day Indications: PRN ) 90 tablet 0    omeprazole (PRILOSEC) 20 MG delayed release capsule Take 1 capsule by mouth daily (Patient taking differently: Take 20 mg by mouth daily Indications: PRN ) 30 capsule 3    fluconazole Pulses: Normal pulses. Heart sounds: Normal heart sounds. Pulmonary:      Effort: Pulmonary effort is normal.      Breath sounds: Normal breath sounds. Abdominal:      General: There is no distension. Hernia: There is no hernia in the left inguinal area or right inguinal area. Genitourinary:     Exam position: Lithotomy position. Dallin stage (genital): 5. Labia:         Right: No rash. Left: No rash. Vagina: Vaginal discharge (white thick discharge with \"stuck on\" appearance) present. Musculoskeletal:         General: Normal range of motion. Cervical back: Neck supple. Right lower leg: No edema. Left lower leg: No edema. Lymphadenopathy:      Cervical: No cervical adenopathy. Lower Body: No right inguinal adenopathy. No left inguinal adenopathy. Skin:     General: Skin is warm and dry. Capillary Refill: Capillary refill takes less than 2 seconds. Neurological:      General: No focal deficit present. Mental Status: She is alert and oriented to person, place, and time. Psychiatric:         Mood and Affect: Mood normal.         Behavior: Behavior normal.           :       Diagnosis Orders   1. Vagina itching  Vaginitis DNA Probe    metroNIDAZOLE (METROGEL) 0.75 % vaginal gel    diphenhydrAMINE (BENADRYL) 25 MG capsule   2. Vaginal discharge  metroNIDAZOLE (METROGEL) 0.75 % vaginal gel   3. Seasonal allergic rhinitis, unspecified trigger  fluticasone (FLONASE) 50 MCG/ACT nasal spray             :          1. Vagina itching  2. Vaginal discharge  Thick white discharge appears like yeast, will treat and send vaginitis swab. Metrogel daily x 5 days. Trial benadryl nightly for itching, may take during day if not drowsy and helps. Discussed itching may be related to pubic hair and sweating- wear cotton underwear and loose fitting clothing.        - Vaginitis DNA Probe;  Future  - metroNIDAZOLE (METROGEL) 0.75 % vaginal gel; Place 1 Applicatorful vaginally daily for 5 days  Dispense: 70 g; Refill: 0  - diphenhydrAMINE (BENADRYL) 25 MG capsule; Take 1 capsule by mouth nightly as needed for Itching  Dispense: 30 capsule; Refill: 0    3. Seasonal allergic rhinitis, unspecified trigger  - fluticasone (FLONASE) 50 MCG/ACT nasal spray; 2 sprays by Each Nostril route daily  Dispense: 16 g; Refill: 3      Return if symptoms worsen or fail to improve. Patient given educational materials - see patient instructions. Discussed use, benefit, and side effects of prescribed medications. All patient questions answered. Pt voiced understanding. Reviewed health maintenance. Instructed to continue current medications, diet and exercise. Patient agreed with treatment plan. Follow up as directed.        Electronicallysigned by RADHA Escudero CNP on 9/20/2021 at 1:00 PM

## 2021-09-21 LAB
DIRECT EXAM: ABNORMAL
Lab: ABNORMAL
SPECIMEN DESCRIPTION: ABNORMAL

## 2021-10-05 RX ORDER — ERGOCALCIFEROL 1.25 MG/1
50000 CAPSULE ORAL WEEKLY
Qty: 12 CAPSULE | Refills: 1 | Status: SHIPPED | OUTPATIENT
Start: 2021-10-05 | End: 2021-10-06 | Stop reason: SDUPTHER

## 2021-10-06 RX ORDER — ERGOCALCIFEROL 1.25 MG/1
50000 CAPSULE ORAL WEEKLY
Qty: 12 CAPSULE | Refills: 1 | Status: SHIPPED | OUTPATIENT
Start: 2021-10-06 | End: 2022-05-13

## 2021-10-07 ENCOUNTER — TELEPHONE (OUTPATIENT)
Dept: ONCOLOGY | Age: 41
End: 2021-10-07

## 2021-10-07 ENCOUNTER — HOSPITAL ENCOUNTER (OUTPATIENT)
Age: 41
Discharge: HOME | End: 2021-10-07
Payer: COMMERCIAL

## 2021-10-07 ENCOUNTER — INITIAL CONSULT (OUTPATIENT)
Dept: ONCOLOGY | Age: 41
End: 2021-10-07
Payer: COMMERCIAL

## 2021-10-07 VITALS
SYSTOLIC BLOOD PRESSURE: 115 MMHG | WEIGHT: 104 LBS | HEIGHT: 62 IN | TEMPERATURE: 96.2 F | HEART RATE: 64 BPM | BODY MASS INDEX: 19.14 KG/M2 | DIASTOLIC BLOOD PRESSURE: 72 MMHG

## 2021-10-07 DIAGNOSIS — D69.6 THROMBOCYTOPENIA (HCC): ICD-10-CM

## 2021-10-07 LAB
ABSOLUTE EOS #: 0.3 K/UL (ref 0–0.4)
ABSOLUTE IMMATURE GRANULOCYTE: ABNORMAL K/UL (ref 0–0.3)
ABSOLUTE LYMPH #: 1.8 K/UL (ref 1–4.8)
ABSOLUTE MONO #: 0.6 K/UL (ref 0.1–1.2)
ABSOLUTE RETIC #: 0.06 M/UL (ref 0.03–0.08)
ALBUMIN SERPL-MCNC: 4.4 G/DL (ref 3.5–5.2)
ALBUMIN/GLOBULIN RATIO: 2 (ref 1–2.5)
ALP BLD-CCNC: 53 U/L (ref 35–104)
ALT SERPL-CCNC: 15 U/L (ref 5–33)
ANION GAP SERPL CALCULATED.3IONS-SCNC: 6 MMOL/L (ref 9–17)
AST SERPL-CCNC: 18 U/L
BASOPHILS # BLD: 0 % (ref 0–2)
BASOPHILS ABSOLUTE: 0 K/UL (ref 0–0.2)
BILIRUB SERPL-MCNC: 0.87 MG/DL (ref 0.3–1.2)
BUN BLDV-MCNC: 7 MG/DL (ref 6–20)
BUN/CREAT BLD: ABNORMAL (ref 9–20)
CALCIUM SERPL-MCNC: 9.2 MG/DL (ref 8.6–10.4)
CHLORIDE BLD-SCNC: 103 MMOL/L (ref 98–107)
CO2: 26 MMOL/L (ref 20–31)
CREAT SERPL-MCNC: 0.52 MG/DL (ref 0.5–0.9)
DIFFERENTIAL TYPE: ABNORMAL
EOSINOPHILS RELATIVE PERCENT: 3 % (ref 1–4)
FOLATE: 10.3 NG/ML
GFR AFRICAN AMERICAN: >60 ML/MIN
GFR NON-AFRICAN AMERICAN: >60 ML/MIN
GFR SERPL CREATININE-BSD FRML MDRD: ABNORMAL ML/MIN/{1.73_M2}
GFR SERPL CREATININE-BSD FRML MDRD: ABNORMAL ML/MIN/{1.73_M2}
GLUCOSE BLD-MCNC: 67 MG/DL (ref 70–99)
HAV IGM SER IA-ACNC: NONREACTIVE
HCT VFR BLD CALC: 39 % (ref 36–46)
HEMOGLOBIN: 13.4 G/DL (ref 12–16)
HEPATITIS B CORE IGM ANTIBODY: NONREACTIVE
HEPATITIS B SURFACE ANTIGEN: NONREACTIVE
HEPATITIS C ANTIBODY: NONREACTIVE
IMMATURE GRANULOCYTES: ABNORMAL %
IMMATURE RETIC FRACT: 8.7 % (ref 2.7–18.3)
LYMPHOCYTES # BLD: 23 % (ref 24–44)
MCH RBC QN AUTO: 31.4 PG (ref 26–34)
MCHC RBC AUTO-ENTMCNC: 34.4 G/DL (ref 31–37)
MCV RBC AUTO: 91.3 FL (ref 80–100)
MONOCYTES # BLD: 7 % (ref 2–11)
NRBC AUTOMATED: ABNORMAL PER 100 WBC
PDW BLD-RTO: 13.2 % (ref 12.5–15.4)
PLATELET # BLD: 147 K/UL (ref 140–450)
PLATELET ESTIMATE: ABNORMAL
PMV BLD AUTO: 9.6 FL (ref 6–12)
POTASSIUM SERPL-SCNC: 4 MMOL/L (ref 3.7–5.3)
RBC # BLD: 4.27 M/UL (ref 4–5.2)
RBC # BLD: ABNORMAL 10*6/UL
RETIC %: 1.3 % (ref 0.5–1.9)
RETIC HEMOGLOBIN: 36.6 PG (ref 28.2–35.7)
RHEUMATOID FACTOR: <10 IU/ML
SEG NEUTROPHILS: 67 % (ref 36–66)
SEGMENTED NEUTROPHILS ABSOLUTE COUNT: 5.1 K/UL (ref 1.8–7.7)
SODIUM BLD-SCNC: 135 MMOL/L (ref 135–144)
TOTAL PROTEIN: 6.6 G/DL (ref 6.4–8.3)
VITAMIN B-12: 708 PG/ML (ref 232–1245)
WBC # BLD: 7.7 K/UL (ref 3.5–11)
WBC # BLD: ABNORMAL 10*3/UL

## 2021-10-07 PROCEDURE — 80074 ACUTE HEPATITIS PANEL: CPT

## 2021-10-07 PROCEDURE — 99245 OFF/OP CONSLTJ NEW/EST HI 55: CPT | Performed by: INTERNAL MEDICINE

## 2021-10-07 PROCEDURE — 86225 DNA ANTIBODY NATIVE: CPT

## 2021-10-07 PROCEDURE — 36415 COLL VENOUS BLD VENIPUNCTURE: CPT

## 2021-10-07 PROCEDURE — 82746 ASSAY OF FOLIC ACID SERUM: CPT

## 2021-10-07 PROCEDURE — 85025 COMPLETE CBC W/AUTO DIFF WBC: CPT

## 2021-10-07 PROCEDURE — 99202 OFFICE O/P NEW SF 15 MIN: CPT | Performed by: INTERNAL MEDICINE

## 2021-10-07 PROCEDURE — 82607 VITAMIN B-12: CPT

## 2021-10-07 PROCEDURE — 85045 AUTOMATED RETICULOCYTE COUNT: CPT

## 2021-10-07 PROCEDURE — 86038 ANTINUCLEAR ANTIBODIES: CPT

## 2021-10-07 PROCEDURE — 86431 RHEUMATOID FACTOR QUANT: CPT

## 2021-10-07 PROCEDURE — 80053 COMPREHEN METABOLIC PANEL: CPT

## 2021-10-07 NOTE — PROGRESS NOTES
DIAGNOSIS:   1. History of pregnancy-induced thrombocytopenia   2. Progressive thrombocytopenia over the last couple of years  CURRENT THERAPY:  Work-up in progress  BRIEF CASE HISTORY:   Annalisa Pan is a very pleasant 35 y.o. female who is referred to us for mild but progressive thrombocytopenia. Her platelets have been slowly dropping over the last few years last platelets in September were at 117. She is sent to us for a consultation, she overall feels well and has no complaints. She remembers having mild thrombocytopenia during pregnancy in . Last CBC in September showed platelets of 868, but there was very small percentage of immature granulocyte +1 seen. Patient denies any bleeding or bruising    PAST MEDICAL HISTORY: has a past medical history of Dorsal back pain, DUB (dysfunctional uterine bleeding), Menorrhagia, Other specified fetal and placental problems affecting management of mother, antepartum, 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[address] Suspected placental problem not found, Thrombocytopenia ([address] Uterine leiomyoma, and Vitamin D deficiency. PAST SURGICAL HISTORY: has a past surgical history that includes Tubal ligation (10/8/12);  section, low transverse (2004);  section, low transverse (2008);  section, low transverse (10/2012); Dilation and curettage of uterus (2020); and Dilation and curettage of uterus (N/A, 2020). CURRENT MEDICATIONS:  has a current medication list which includes the following prescription(s): vitamin d, fluticasone, loratadine, fluocinolone acetonide, eq eye itch relief, ibuprofen, ferrous sulfate, omeprazole, fluconazole, and [DISCONTINUED] vitamin d. ALLERGIES:  has No Known Allergies. FAMILY HISTORY: Negative for any hematological or oncological conditions. SOCIAL HISTORY:  reports that she has never smoked.  She has never used smokeless tobacco. She reports that she does not drink alcohol and does not use Latest Ref Range: 138 - 453 k/uL 147 145 140 (L) 131 172 129 (L) 117 (L)     REVIEW OF RADIOLOGICAL RESULTS:     IMPRESSION:   1. Mild but progressive thrombocytopenia  2. History of pregnancy-induced thrombocytopenia  3. No symptoms of bleeding or bruisability  PLAN:   I had a long discussion with the patient, she obviously is doing well and she is asymptomatic. Patient work-up will be done but considering her mild thrombocytopenia and absence of symptoms, I would delay invasive testing such as bone marrow aspiration and biopsy at this point. Blood smear will be reviewed. 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   She does not have any evidence of liver disease but we will check for hepatitis

## 2021-10-08 LAB
ANTI DNA DOUBLE STRANDED: 2.5 IU/ML
ANTI-NUCLEAR ANTIBODY (ANA): NEGATIVE
ENA ANTIBODIES SCREEN: 0.2 U/ML
PATHOLOGIST REVIEW: NORMAL
SURGICAL PATHOLOGY REPORT: NORMAL

## 2021-10-15 ENCOUNTER — OFFICE VISIT (OUTPATIENT)
Dept: PRIMARY CARE CLINIC | Age: 41
End: 2021-10-15
Payer: COMMERCIAL

## 2021-10-15 VITALS
BODY MASS INDEX: 19.14 KG/M2 | HEART RATE: 69 BPM | OXYGEN SATURATION: 99 % | RESPIRATION RATE: 13 BRPM | DIASTOLIC BLOOD PRESSURE: 80 MMHG | WEIGHT: 104 LBS | SYSTOLIC BLOOD PRESSURE: 132 MMHG | HEIGHT: 62 IN

## 2021-10-15 DIAGNOSIS — B96.89 BACTERIAL VAGINOSIS: Primary | ICD-10-CM

## 2021-10-15 DIAGNOSIS — N76.0 BACTERIAL VAGINOSIS: Primary | ICD-10-CM

## 2021-10-15 DIAGNOSIS — R53.83 FATIGUE, UNSPECIFIED TYPE: ICD-10-CM

## 2021-10-15 DIAGNOSIS — K21.9 GASTROESOPHAGEAL REFLUX DISEASE WITHOUT ESOPHAGITIS: ICD-10-CM

## 2021-10-15 PROCEDURE — 99213 OFFICE O/P EST LOW 20 MIN: CPT | Performed by: STUDENT IN AN ORGANIZED HEALTH CARE EDUCATION/TRAINING PROGRAM

## 2021-10-15 RX ORDER — METRONIDAZOLE 500 MG/1
500 TABLET ORAL 2 TIMES DAILY
Qty: 14 TABLET | Refills: 0 | Status: SHIPPED | OUTPATIENT
Start: 2021-10-15 | End: 2021-10-22

## 2021-10-15 ASSESSMENT — ENCOUNTER SYMPTOMS
ABDOMINAL DISTENTION: 0
COUGH: 0
RHINORRHEA: 0
SHORTNESS OF BREATH: 0
WHEEZING: 0
EYE DISCHARGE: 0
EYE ITCHING: 0
ABDOMINAL PAIN: 0
BACK PAIN: 0

## 2021-10-15 NOTE — PROGRESS NOTES
[facility] CARE  [facility] [address] [facility] [address]  [address] [address]  Dept: [phone]  Dept Fax: [phone]    Beckie Chou is a 35 y.o. female who presents today for her medical conditions/complaints as noted below. Chief Complaint   Patient presents with    Fatigue     x 3/4 weeks     Vaginal Itching     x 3 weeks not getting better        HPI:     35 y. o. F presented to office today for urgent visit for fatigue, weakness and vaginal itching. Patient mentioned that she has been feeling very weak and fatigued all the time since 2- 3 weeks. She had blood work-up ordered which showed low platelets after which she followed up with heme oncology as well. Explained and discussed all the labs in details. Updated her about the recent platelet count which is normal.  Patient was very stressed about her low platelet numbers and mentioned it was adding onto her fatigue and weakness. Explained everything in details. She denied any low mood, anxiety, any fever chills, chest pain, palpitations, change in bowel bladder habits or recent travels. She also mentioned she has been having vaginal itching since 2 3 weeks. Has minimal vaginal discharge. She will be treated with Diflucan which did not help her much. She was positive for Gardnerella vaginalis and the vaginitis probe done 3 weeks ago. Denied any rash, erythema, foul-smelling discharge or vaginal bleeding. She mentioned that her itching mainly started after her D&C procedure. Patient also mentioned that she has heartburn on and off. .  Advised to take Prilosec as needed. Advised lifestyle changes    Medications reviewed and refilled as appropriate, problem list updated. All concerns discussed in details and all questions answered to patient satisfaction.             Hemoglobin A1C (%)   Date Value   2020 5.3   2018 5.0             ( goal A1C is < 7)   No results found for: LABMICR  LDL Cholesterol (mg/dL)   Date Value   09/10/2021 63   2020 64     LDL Calculated (mg/dL)   Date Value   2019 55       (goal LDL is <100)   AST (U/L)   Date Value   10/07/2021 18     ALT (U/L)   Date Value   10/07/2021 15     BUN (mg/dL)   Date Value   10/07/2021 7     BP Readings from Last 3 Encounters:   10/15/21 132/80   10/07/21 115/72   21 128/64          (goal 120/80)    Past Medical History:   Diagnosis Date    Dorsal back pain 2013    DUB (dysfunctional uterine bleeding) 2013    Menorrhagia 10/20/2014    Other specified fetal and placental problems affecting management of mother, antepartum 2012    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[facility]     Suspected placental problem not found 2012    Thrombocytopenia ([facility]     Uterine leiomyoma 2018    Vitamin D deficiency       Past Surgical History:   Procedure Laterality Date     SECTION, LOW TRANSVERSE  2004     SECTION, LOW TRANSVERSE  2008     SECTION, LOW TRANSVERSE  10/2012    DILATION AND CURETTAGE OF UTERUS  2020     DILATATION AND CURETTAGE HYSTEROSCOPY, NOVASURE    DILATION AND CURETTAGE OF UTERUS N/A 2020    DILATATION AND CURETTAGE HYSTEROSCOPY, Poncho Sims performed by Calvin Eduardo MD at [address] [place] [address]  10/8/12       Family History   Problem Relation Age of Onset    High Blood Pressure Mother     High Blood Pressure Father        Social History     Tobacco Use    Smoking status: Never Smoker    Smokeless tobacco: Never Used   Substance Use Topics    Alcohol use: No      Current Outpatient Medications   Medication Sig Dispense Refill    metroNIDAZOLE (FLAGYL) 500 MG tablet Take 1 tablet by mouth 2 times daily for 7 days 14 tablet 0    vitamin D (ERGOCALCIFEROL) 1.25 MG (27309 UT) CAPS capsule Take 1 capsule by mouth once a week 12 capsule 1    fluconazole (DIFLUCAN) 150 MG tablet       fluticasone (FLONASE) 50 MCG/ACT nasal spray 2 sprays by Each Nostril route 1/15/2022) for pcp. No orders of the defined types were placed in this encounter. Orders Placed This Encounter   Medications    metroNIDAZOLE (FLAGYL) 500 MG tablet     Sig: Take 1 tablet by mouth 2 times daily for 7 days     Dispense:  14 tablet     Refill:  0         Patient given educational materials - see patient instructions. Discussed use, benefit, and side effects of prescribedmedications. All patient questions answered. Pt voiced understanding. Reviewed health maintenance. Instructed to continue current medications, diet and exercise. Patient agreed with treatment plan. Follow up as directed. I spent a total of 20-29 minutes face to face with this patient. Over 50% of that time was spent on counseling and care coordination. Please see assessment and plan for details. Electronically signed by   Wilbert Mcelroy MD on 10/15/2021 at 9:03 AM  [facility]. Please note that this chart was generated using voice recognition Dragon dictation software. Although every effort was made to ensure the accuracy of this automatedtranscription, some errors in transcription may have occurred.

## 2021-10-22 ENCOUNTER — NURSE TRIAGE (OUTPATIENT)
Dept: OTHER | Facility: CLINIC | Age: 41
End: 2021-10-22

## 2021-10-22 NOTE — TELEPHONE ENCOUNTER
Reason for Disposition   [1] Numbness or tingling on both sides of body AND [2] is a new symptom present > 24 hours    Answer Assessment - Initial Assessment Questions  1. SYMPTOM: \"What is the main symptom you are concerned about? \" (e.g., weakness, numbness)      Numbness in bilateral arms but left arm worse than right    2. ONSET: \"When did this start? \" (minutes, hours, days; while sleeping)      Brian 10/10/21    3. LAST NORMAL: \"When was the last time you were normal (no symptoms)? \"      Prior to the 10/10/21    4. PATTERN \"Does this come and go, or has it been constant since it started? \"  \"Is it present now? \"      Comes and goes    5. CARDIAC SYMPTOMS: \"Have you had any of the following symptoms: chest pain, difficulty breathing, palpitations? \"      Denies    6. NEUROLOGIC SYMPTOMS: \"Have you had any of the following symptoms: headache, dizziness, vision loss, double vision, changes in speech, unsteady on your feet? \"      Headache and dizziness for last 3-4 days- comes and goes. Denies dizziness right now. Increased fatigue and and weakness- mainly in the morning. States feels very tired right now    7. OTHER SYMPTOMS: \"Do you have any other symptoms? \"      See above    8. PREGNANCY: \"Is there any chance you are pregnant? \" \"When was your last menstrual period? \"      n/a    Protocols used: NEUROLOGIC DEFICIT-ADULT-AH    Received call from [facility] with [employer]. Brief description of triage: Patient experiencing numbness in bilateral arms x 2 weeks. Also experiencing increased fatigue and weakness, especially in morning. Triage indicates for patient to Be seen within 3 weeks. Care advice provided, patient verbalizes understanding; denies any other questions or concerns; instructed to call back for any new or worsening symptoms. Writer provided warm transfer to [facility] at [facility] for appointment scheduling. Attention Provider:   Thank you for allowing me to participate in the care of your patient. The patient was connected to triage in response to information provided to the ECC/PSC. Please do not respond through this encounter as the response is not directed to a shared pool.

## 2021-11-04 ENCOUNTER — OFFICE VISIT (OUTPATIENT)
Dept: ONCOLOGY | Age: 41
End: 2021-11-04
Payer: COMMERCIAL

## 2021-11-04 ENCOUNTER — TELEPHONE (OUTPATIENT)
Dept: ONCOLOGY | Age: 41
End: 2021-11-04

## 2021-11-04 VITALS
DIASTOLIC BLOOD PRESSURE: 76 MMHG | BODY MASS INDEX: 18.88 KG/M2 | TEMPERATURE: 97.2 F | SYSTOLIC BLOOD PRESSURE: 125 MMHG | WEIGHT: 103.2 LBS | HEART RATE: 76 BPM

## 2021-11-04 DIAGNOSIS — D69.6 THROMBOCYTOPENIA (HCC): ICD-10-CM

## 2021-11-04 PROCEDURE — 99211 OFF/OP EST MAY X REQ PHY/QHP: CPT | Performed by: INTERNAL MEDICINE

## 2021-11-04 PROCEDURE — 99214 OFFICE O/P EST MOD 30 MIN: CPT | Performed by: INTERNAL MEDICINE

## 2021-11-04 NOTE — PROGRESS NOTES
DIAGNOSIS:   1. History of pregnancy-induced thrombocytopenia   2. Progressive thrombocytopenia over the last couple of years  CURRENT THERAPY:  Work-up in progress  BRIEF CASE HISTORY:   Liliya Cespedes is a very pleasant 35 y.o. female who is referred to us for mild but progressive thrombocytopenia. Her platelets have been slowly dropping over the last few years last platelets in September were at 117. She is sent to us for a consultation, she overall feels well and has no complaints. She remembers having mild thrombocytopenia during pregnancy in . Last CBC in September showed platelets of 296, but there was very small percentage of immature granulocyte +1 seen. Patient denies any bleeding or bruising  The patient is seen and evaluated. Extensive work-up was done and showed improved with platelets up to 1 63,816. K56 and folic acid were normal.  Differential leukocyte count was normal.  Liver enzymes are normal.  PAST MEDICAL HISTORY: has a past medical history of Dorsal back pain, DUB (dysfunctional uterine bleeding), Menorrhagia, Other specified fetal and placental problems affecting management of mother, antepartum, 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[address] Suspected placental problem not found, Thrombocytopenia ([address] Uterine leiomyoma, and Vitamin D deficiency. PAST SURGICAL HISTORY: has a past surgical history that includes Tubal ligation (10/8/12);  section, low transverse (2004);  section, low transverse (2008);  section, low transverse (10/2012); Dilation and curettage of uterus (2020); and Dilation and curettage of uterus (N/A, 2020). CURRENT MEDICATIONS:  has a current medication list which includes the following prescription(s): vitamin d, fluconazole, fluticasone, loratadine, fluocinolone acetonide, eq eye itch relief, ibuprofen, omeprazole, and [DISCONTINUED] vitamin d. ALLERGIES:  has No Known Allergies.     FAMILY HISTORY: Negative for any hematological or oncological conditions. SOCIAL HISTORY:  reports that she has never smoked. She has never used smokeless tobacco. She reports that she does not drink alcohol and does not use drugs. REVIEW OF SYSTEMS:   General: no fever or night sweats, Weight is stable. ENT: No double or blurred vision, no tinnitus or hearing problem, no dysphagia or sore throat   Respiratory: No chest pain, no shortness of breath, no cough or hemoptysis. Cardiovascular: Denies chest pain, PND or orthopnea. No L E swelling or palpitations. Gastrointestinal:    No nausea or vomiting, abdominal pain, diarrhea or constipation. Genitourinary: Denies dysuria, hematuria, frequency, urgency or incontinence. Neurological: Denies headaches, decreased LOC, no sensory or motor focal deficits. Musculoskeletal:  No arthralgia no back pain or joint swelling. Skin: There are no rashes or bleeding. Psychiatric:  No anxiety, no depression. Endocrine: no diabetes or thyroid disease. Hematologic: no bleeding , no adenopathy. PHYSICAL EXAM: Shows a well appearing 35y.o.-year-old female who is not in pain or distress. Vital Signs: Blood pressure 125/76, pulse 76, temperature 97.2 °F (36.2 °C), temperature source Temporal, weight 103 lb 3.2 oz (46.8 kg), not currently breastfeeding. HEENT: Normocephalic and atraumatic. Pupils are equal, round, reactive to light and accommodation. Extraocular muscles are intact. Neck: Showed no JVD, no carotid bruit . Lungs: Clear to auscultation bilaterally. Heart: Regular without any murmur. Abdomen: Soft, nontender. No hepatosplenomegaly. Extremities: Lower extremities show no edema, clubbing, or cyanosis. Breasts: Examination not done today.  Neuro exam: intact cranial nerves bilaterally no motor or sensory deficit, gait is normal. Lymphatic: no adenopathy appreciated in the supraclavicular, axillary, cervical or inguinal area    REVIEW OF LABORATORY DATA:   Results for [facility] Kamuela (MRN [id number]) as of 11/4/2021 13:21   Ref. Range 6/11/2019 00:00 2/5/2020 09:48 9/24/2020 09:17 9/10/2021 09:03 10/7/2021 10:07   Platelet Count Latest Ref Range: 140 - 450 k/uL 131 172 129 (L) 117 (L) 147     REVIEW OF RADIOLOGICAL RESULTS:     IMPRESSION:   1. Mild but progressive thrombocytopenia  2. History of pregnancy-induced thrombocytopenia  3. No symptoms of bleeding or bruisability  PLAN:   I had a long discussion with the patient. Work-up was reviewed with her. Platelets are back to normal.  Work-up is essentially otherwise negative. I think she might have mildly mild ITP that leads to fluctuation of platelets. As long as they are above 100,000, no intervention is necessary.   We will continue observation by primary care physician and will be available as needed

## 2021-11-04 NOTE — TELEPHONE ENCOUNTER
AVS from 11/4/21    rv PRN    Patient was given AVS with office number to contact with any further needs    Electronically signed by Rosa Hammond on 11/4/2021 at 12:04 PM

## 2021-11-10 ENCOUNTER — TELEPHONE (OUTPATIENT)
Dept: PRIMARY CARE CLINIC | Age: 41
End: 2021-11-10

## 2021-11-23 NOTE — PROGRESS NOTES
[facility] PHYSICIANS  [facility] [address]  [address] [facility] [id number]     Hugo Garcia is a 35 y.o. y.o. female who presents today for had concerns including Vaginal Itching. States she has noticed an increase in watery odorless Discharge and spotting 2-3d. No cramping. Tested positive for BV in September 2021 completed flagyl and metrogel and states symptoms have never completely gone away. Itching is worse. Irregular bleeding since [facility] , ablation, hysteroscopy February 2020. Not sexually active. No urinary symptoms    Reports loose stools for past 5-6d, no fever. Working on this issue with PCP. Started magnesium with no relief so she stopped taking it. /64 (Site: Right Upper Arm, Position: Sitting, Cuff Size: Small Adult)   Ht 5' 2\" (1.575 m)   Wt 104 lb (47.2 kg)   LMP 10/19/2021 (Approximate)   BMI 19.02 kg/m²     Allergies:  No Known Allergies       Review of Systems:  Review of Systems   Gastrointestinal: Positive for diarrhea. Negative for abdominal pain. Genitourinary: Positive for vaginal bleeding (spotting) and vaginal discharge (and itching). Negative for decreased urine volume, difficulty urinating, dyspareunia, dysuria, enuresis, flank pain, frequency, genital sores, hematuria, menstrual problem, pelvic pain, urgency and vaginal pain. All other systems reviewed and are negative. Physical Exam:  Physical Exam  Constitutional:       General: She is awake. She is not in acute distress. Appearance: Normal appearance. She is not ill-appearing, toxic-appearing or diaphoretic. Genitourinary:      Right Labia: tenderness. Right Labia: No rash, lesions or skin changes. Left Labia: tenderness. Left Labia: No lesions, skin changes or rash. Neurological:      Mental Status: She is alert. Psychiatric:         Behavior: Behavior is cooperative. Assessment and Plan:  Emilee Flores was seen today for vaginal itching.     Diagnoses and all orders for this visit:    Vaginal itching     Encouraged patient to contact PCP regarding diarrhea. Encouraged her to use OTC hydrocortisone cream and cold compresses for itching. Affirm collected and sent to lab  Will treat w/Flagyl for BV or Diflucan if yeast pending results  She was also counseled on her preventative health maintenance recommendations and follow-up.   RTO annual exam and PRN    Electronically signed by Elidia Dakin, APRN - CNP

## 2021-11-23 NOTE — PATIENT INSTRUCTIONS
Patient Education      Patient Education        Vaginal Yeast Infection: Care Instructions  Overview     A vaginal yeast infection is the growth of too many yeast cells in the vagina. This is common in women of all ages. Itching, vaginal discharge and irritation, and other symptoms can bother you. But yeast infections don't often cause other health problems. Some medicines can increase your risk of getting a yeast infection. These include antibiotics, birth control pills, hormones, and steroids. You may also be more likely to get a yeast infection if you are pregnant, have diabetes, douche, or wear tight clothes. With treatment, most yeast infections get better in 2 to 3 days. Follow-up care is a key part of your treatment and safety. Be sure to make and go to all appointments, and call your doctor if you are having problems. It's also a good idea to know your test results and keep a list of the medicines you take. How can you care for yourself at home? · Take your medicines exactly as prescribed. Call your doctor if you think you are having a problem with your medicine. · Ask your doctor about over-the-counter (OTC) medicines for yeast infections. They may cost less than prescription medicines. If you use an OTC treatment, read and follow all instructions on the label. · Don't use tampons while using a vaginal cream or suppository. The tampons can absorb the medicine. Use pads instead. · Wear loose cotton clothing. Don't wear nylon or other fabric that holds body heat and moisture close to the skin. · Try sleeping without underwear. · Don't scratch. Relieve itching with a cold pack or a cool bath. · Don't wash your vaginal area more than once a day. Use plain water or a mild, unscented soap. Air-dry the vaginal area. · Change out of wet swimsuits after swimming. · If you are using a vaginal medicine, don't have sex until you have finished your treatment.  But if you do have sex, don't depend on a condom or diaphragm for birth control. The oil in some vaginal medicines weakens latex. · Don't douche. When should you call for help? Call your doctor now or seek immediate medical care if:    · You have unexpected vaginal bleeding.     · You have new or increased pain in your vagina or pelvis. Watch closely for changes in your health, and be sure to contact your doctor if:    · You have a fever.     · You are not getting better after 2 days.     · Your symptoms come back after you finish your medicines. Where can you learn more? Go to [email or web address].[email or web address] and sign in to your [employer] [facility] account. Enter T233 in the JustSpotted box to learn more about \"Vaginal Yeast Infection: Care Instructions. \"     If you do not have an account, please click on the \"Sign Up Now\" link. Current as of: February 11, 2021               Content Version: 13.0  © 2006-2021 [employer]. Care instructions adapted under license by [facility] ([facility]). If you have questions about a medical condition or this instruction, always ask your healthcare professional. [address] any warranty or liability for your use of this information. Vaginitis: Care Instructions  Your Care Instructions     Vaginitis is soreness or infection of the vagina. This common problem can cause itching and burning. And it can cause a change in vaginal discharge. Sometimes it can cause pain during sex. Vaginitis may be caused by bacteria, yeast, or other germs. Some infections that cause it are caught from a sexual partner. Bath products, spermicides, and douches can irritate the vagina too. Some women have this problem during and after menopause. A drop in estrogen levels during this time can cause dryness, soreness, and pain during sex. Your doctor can give you medicine to treat an infection. And home care may help you feel better.  For certain types of infections, your sex partner must be treated too.  Follow-up care is a key part of your treatment and safety. Be sure to make and go to all appointments, and call your doctor if you are having problems. It's also a good idea to know your test results and keep a list of the medicines you take. How can you care for yourself at home? · If your doctor prescribed antibiotics, take them as directed. Do not stop taking them just because you feel better. You need to take the full course of antibiotics. · Take your medicines exactly as prescribed. Call your doctor if you think you are having a problem with your medicine. · Do not eat or drink anything that has alcohol if you are taking metronidazole (Flagyl). · If you have a yeast infection, use over-the-counter products as your doctor tells you to. Or take medicine your doctor prescribes exactly as directed. · Wash your vaginal area daily with water. You also can use a mild, unscented soap if you want. · Do not use scented bath products. And do not use vaginal sprays or douches. · Put a washcloth soaked in cool water on the area to relieve itching. Or you can take cool baths. · If you have dryness because of menopause, use estrogen cream or pills that your doctor prescribes. · Ask your doctor about when it is okay to have sex. · Use a personal lubricant before sex if you have dryness. Examples are Astroglide, K-Y Jelly, and Wet Lubricant Gel. · Ask your doctor if your sex partner also needs treatment. When should you call for help? Call your doctor now or seek immediate medical care if:    · You have a fever and pelvic pain. Watch closely for changes in your health, and be sure to contact your doctor if:    · You have bleeding other than your period.     · You do not get better as expected. Where can you learn more? Go to [email or web address] and sign in to your [facility] account. Enter C492 in the YourTime Solutions box to learn more about \"Vaginitis: Care Instructions. \" If you do not have an account, please click on the \"Sign Up Now\" link. Current as of: February 11, 2021               Content Version: 13.0  © 6054-8851 Healthwise, Incorporated. Care instructions adapted under license by [facility] ([facility]). If you have questions about a medical condition or this instruction, always ask your healthcare professional. [address] any warranty or liability for your use of this information.

## 2021-11-24 ENCOUNTER — OFFICE VISIT (OUTPATIENT)
Dept: OBGYN CLINIC | Age: 41
End: 2021-11-24
Payer: COMMERCIAL

## 2021-11-24 ENCOUNTER — HOSPITAL ENCOUNTER (OUTPATIENT)
Age: 41
Setting detail: SPECIMEN
Discharge: HOME | End: 2021-11-24

## 2021-11-24 VITALS
BODY MASS INDEX: 19.14 KG/M2 | DIASTOLIC BLOOD PRESSURE: 64 MMHG | SYSTOLIC BLOOD PRESSURE: 120 MMHG | WEIGHT: 104 LBS | HEIGHT: 62 IN

## 2021-11-24 DIAGNOSIS — N89.8 VAGINAL ITCHING: ICD-10-CM

## 2021-11-24 DIAGNOSIS — N89.8 VAGINAL ITCHING: Primary | ICD-10-CM

## 2021-11-24 PROCEDURE — 99213 OFFICE O/P EST LOW 20 MIN: CPT

## 2021-11-24 ASSESSMENT — ENCOUNTER SYMPTOMS
DIARRHEA: 1
ABDOMINAL PAIN: 0

## 2021-11-25 LAB
DIRECT EXAM: NORMAL
Lab: NORMAL
SPECIMEN DESCRIPTION: NORMAL

## 2021-12-21 ASSESSMENT — ENCOUNTER SYMPTOMS
SHORTNESS OF BREATH: 0
COUGH: 0
BACK PAIN: 0
CONSTIPATION: 0
ABDOMINAL DISTENTION: 0
ABDOMINAL PAIN: 0
DIARRHEA: 0

## 2021-12-22 ENCOUNTER — OFFICE VISIT (OUTPATIENT)
Dept: OBGYN CLINIC | Age: 41
End: 2021-12-22
Payer: COMMERCIAL

## 2021-12-22 ENCOUNTER — HOSPITAL ENCOUNTER (OUTPATIENT)
Age: 41
Setting detail: SPECIMEN
Discharge: HOME | End: 2021-12-22

## 2021-12-22 VITALS
WEIGHT: 101 LBS | DIASTOLIC BLOOD PRESSURE: 60 MMHG | SYSTOLIC BLOOD PRESSURE: 108 MMHG | BODY MASS INDEX: 18.58 KG/M2 | HEIGHT: 62 IN

## 2021-12-22 DIAGNOSIS — R10.2 PELVIC PAIN IN FEMALE: ICD-10-CM

## 2021-12-22 DIAGNOSIS — N92.3 IMB (INTERMENSTRUAL BLEEDING): ICD-10-CM

## 2021-12-22 DIAGNOSIS — N89.8 VAGINAL ITCHING: Primary | ICD-10-CM

## 2021-12-22 PROCEDURE — 99213 OFFICE O/P EST LOW 20 MIN: CPT | Performed by: NURSE PRACTITIONER

## 2021-12-22 NOTE — PROGRESS NOTES
Subjective:      Patient ID: Cecilio Tijerina is being seen today for No chief complaint on file. HPI  Here for c/o vaginal itching x 2 months w/discharge. States itching is more external labia. Was treated by her pcp for yeast recently and has had another antibiotic Had ablation, hysteroscopy February 2020. Not sexually active. No urinary symptoms. Had a period in November x 4-5 days. Stopped for 2-3 days then spotted for another 4-5 days. C/O RLQ pain for a few days but thinks it's r/t her period coming. Advised that if does not resolve with her period starting or worsens, to call office and will order a pelvic US. Has not used any ibuprofen or tylenol. Review of Systems   Constitutional: Negative for appetite change and fatigue. HENT: Negative for congestion and hearing loss. Eyes: Negative for visual disturbance. Respiratory: Negative for cough and shortness of breath. Cardiovascular: Negative for chest pain and palpitations. Gastrointestinal: Negative for abdominal distention, abdominal pain, constipation and diarrhea. Genitourinary: Positive for menstrual problem (IMB), pelvic pain (RLQ) and vaginal discharge (w/itching). Negative for flank pain and frequency. Musculoskeletal: Negative for back pain. Neurological: Negative for syncope and headaches. Psychiatric/Behavioral: Negative for behavioral problems. There were no vitals filed for this visit.   Current Outpatient Medications   Medication Sig Dispense Refill    Magnesium 100 MG CAPS Take by mouth      vitamin D (ERGOCALCIFEROL) 1.25 MG (82276 UT) CAPS capsule Take 1 capsule by mouth once a week 12 capsule 1    fluconazole (DIFLUCAN) 150 MG tablet  (Patient not taking: Reported on 11/10/2021)      fluticasone (FLONASE) 50 MCG/ACT nasal spray 2 sprays by Each Nostril route daily 16 g 3    loratadine (CLARITIN) 10 MG tablet Take 1 tablet by mouth daily 30 tablet 2    fluocinolone acetonide (SYNALAR) 0.01 % external solution Use as directed (Patient not taking: Reported on 11/10/2021) 90 mL 0    EQ EYE ITCH RELIEF 0.025 % ophthalmic solution Place 1 drop into both eyes 2 times daily 10 mL 0    ibuprofen (ADVIL;MOTRIN) 800 MG tablet Take 1 tablet by mouth every 8 hours as needed for Pain 30 tablet 0    omeprazole (PRILOSEC) 20 MG delayed release capsule Take 1 capsule by mouth daily (Patient taking differently: Take 20 mg by mouth daily Indications: PRN ) 30 capsule 3     No current facility-administered medications for this visit. No Known Allergies  Patient Active Problem List   Diagnosis    Dorsal back pain    Thrombocytopenia (HCC)    Underweight    Decreased libido    Arthralgia of both hands    Central positional vertigo    Unilateral vestibular weakness    2/7/20 Hysteroscopy, D&C, Novasure ablation         Objective:   Physical Exam  Constitutional:       Appearance: Normal appearance. She is normal weight. Genitourinary:      Right Labia: No rash, tenderness, lesions or skin changes. Left Labia: No tenderness, lesions, skin changes or rash. Vaginal discharge (scant dark red/brown) present. Right Adnexa: not tender. Left Adnexa: not tender. HENT:      Head: Normocephalic. Eyes:      Extraocular Movements: Extraocular movements intact. Conjunctiva/sclera: Conjunctivae normal.   Pulmonary:      Effort: Pulmonary effort is normal.   Abdominal:      General: Abdomen is flat. Palpations: Abdomen is soft. Musculoskeletal:         General: Normal range of motion. Cervical back: Normal range of motion. Neurological:      General: No focal deficit present. Mental Status: She is alert and oriented to person, place, and time. Mental status is at baseline. Skin:     General: Skin is warm and dry. Psychiatric:         Mood and Affect: Mood normal.         Behavior: Behavior normal.         Thought Content:  Thought content normal.         Judgment: Judgment normal.         Assessment:      No diagnosis found. Plan:      Patient is a 35 y.o. [id number]  seen with total face to face time of 15 minutes. More than 50% of this visit was on counseling and education regarding her    Diagnosis Orders   1. Vaginal itching  Vaginitis DNA Probe   2. IMB (intermenstrual bleeding)     3. Pelvic pain in female      and her options. She was also counseled on her preventative health maintenance recommendations and follow-up of annual exam. Refer to plan and assessment.     Recent Results (from the past 8736 hour(s))   Vitamin D 25 Hydroxy    Collection Time: 09/10/21  9:03 AM   Result Value Ref Range    Vit D, 25-Hydroxy 33.5 30.0 - 100.0 ng/mL   TSH without Reflex    Collection Time: 09/10/21  9:03 AM   Result Value Ref Range    TSH 0.60 0.30 - 5.00 mIU/L   Lipid Panel    Collection Time: 09/10/21  9:03 AM   Result Value Ref Range    Cholesterol 129 <200 mg/dL    HDL 57 >40 mg/dL    LDL Cholesterol 63 0 - 130 mg/dL    Chol/HDL Ratio 2.3 <5    Triglycerides 47 <150 mg/dL    VLDL NOT REPORTED 1 - 30 mg/dL   Comprehensive Metabolic Panel    Collection Time: 09/10/21  9:03 AM   Result Value Ref Range    Glucose 87 70 - 99 mg/dL    BUN 6 6 - 20 mg/dL    CREATININE 0.53 0.50 - 0.90 mg/dL    Bun/Cre Ratio NOT REPORTED 9 - 20    Calcium 8.9 8.6 - 10.4 mg/dL    Sodium 139 135 - 144 mmol/L    Potassium 4.9 3.7 - 5.3 mmol/L    Chloride 108 (H) 98 - 107 mmol/L    CO2 23 20 - 31 mmol/L    Anion Gap 8 (L) 9 - 17 mmol/L    Alkaline Phosphatase 55 35 - 104 U/L    ALT 15 5 - 33 U/L    AST 16 <32 U/L    Total Bilirubin 0.58 0.3 - 1.2 mg/dL    Total Protein 6.6 6.4 - 8.3 g/dL    Albumin 4.2 3.5 - 5.2 g/dL    Albumin/Globulin Ratio 1.8 1.0 - 2.5    GFR Non-African American >60 >60 mL/min    GFR African American >60 >60 mL/min    GFR Comment          GFR Staging NOT REPORTED    CBC Auto Differential    Collection Time: 09/10/21  9:03 AM   Result Value Ref Range    WBC 7.8 3.5 - 11.3 k/uL    RBC 4.15 3.95 - 5.11 m/uL    Hemoglobin 12.5 11.9 - 15.1 g/dL    Hematocrit 39.1 36.3 - 47.1 %    MCV 94.2 82.6 - 102.9 fL    MCH 30.1 25.2 - 33.5 pg    MCHC 32.0 28.4 - 34.8 g/dL    RDW 13.2 11.8 - 14.4 %    Platelets 873 (L) 597 - 453 k/uL    MPV 12.2 8.1 - 13.5 fL    NRBC Automated 0.0 0.0 per 100 WBC    Differential Type NOT REPORTED     Seg Neutrophils 63 36 - 65 %    Lymphocytes 24 24 - 43 %    Monocytes 8 3 - 12 %    Eosinophils % 4 1 - 4 %    Basophils 0 0 - 2 %    Immature Granulocytes 1 (H) 0 %    Segs Absolute 5.00 1.50 - 8.10 k/uL    Absolute Lymph # 1.88 1.10 - 3.70 k/uL    Absolute Mono # 0.59 0.10 - 1.20 k/uL    Absolute Eos # 0.28 0.00 - 0.44 k/uL    Basophils Absolute <0.03 0.00 - 0.20 k/uL    Absolute Immature Granulocyte 0.04 0.00 - 0.30 k/uL    WBC Morphology NOT REPORTED     RBC Morphology NOT REPORTED     Platelet Estimate NOT REPORTED    Vaginitis DNA Probe    Collection Time: 09/20/21 11:18 PM    Specimen: Vaginal   Result Value Ref Range    Specimen Description . VAGINA     Special Requests NOT REPORTED     Direct Exam POSITIVE for Gardnerella vaginalis. (A)     Direct Exam NEGATIVE for Candida sp. Direct Exam NEGATIVE for Trichomonas vaginalis     Direct Exam       Method of testing is a DNA probe intended for detection and identification of Candida species, Gardnerella vaginalis, and Trichomonas vaginalis nucleic acid in vaginal fluid specimens from patients with symptoms of vaginitis/vaginosis. SURGICAL PATHOLOGY REPORT    Collection Time: 10/07/21 10:00 AM   Result Value Ref Range    Surgical Pathology Report       [id number]  [employer]  [facility]  ANATOMIC PATHOLOGY  [address] [place] [address], [facility] [address].   [place], [address]  [phone]  Fax: [phone]  SURGICAL PATHOLOGY CONSULTATION    Patient Name: Ladan Graham  MR#: [id number]  Specimen #[id number]    Procedures/Addenda  PERIPHERAL BLOOD REPORT     Date Ordered:     10/8/2021     Status:  Signed Out       Date Complete:     10/8/2021     By:  Sesar Chowdhury M.D. Date Reported:     10/8/2021       INTERPRETATION    Peripheral blood:    -  Within normal parameters, no morphologic abnormality. RESULTS-COMMENTS    PERIPHERAL BLOOD STUDY    CBC: Please see the electronic health record for CBC parameters  ([id number], 10/7/21, 10:07). PLATELETS: Platelets show normal morphology. LEUKOCYTES: White blood cells show normal morphology. There are no  blasts. ERYTHROCYTES: Red blood cells show normal morphology. The absolute  reticulocyte count is in the normal range. Note:  The electronic health record is reviewed. Patient has showed  mild thrombocytopenia in the past. Hematology consultation is in  progress. Sesar Chowdhury M.D.                    Source:  1: PERIPHERAL BLOOD FOR REVIEW BY PATHOLOGIST     CBC Auto Differential    Collection Time: 10/07/21 10:07 AM   Result Value Ref Range    WBC 7.7 3.5 - 11.0 k/uL    RBC 4.27 4.0 - 5.2 m/uL    Hemoglobin 13.4 12.0 - 16.0 g/dL    Hematocrit 39.0 36 - 46 %    MCV 91.3 80 - 100 fL    MCH 31.4 26 - 34 pg    MCHC 34.4 31 - 37 g/dL    RDW 13.2 12.5 - 15.4 %    Platelets 866 562 - 214 k/uL    MPV 9.6 6.0 - 12.0 fL    NRBC Automated NOT REPORTED per 100 WBC    Differential Type NOT REPORTED     Seg Neutrophils 67 (H) 36 - 66 %    Lymphocytes 23 (L) 24 - 44 %    Monocytes 7 2 - 11 %    Eosinophils % 3 1 - 4 %    Basophils 0 0 - 2 %    Immature Granulocytes NOT REPORTED 0 %    Segs Absolute 5.10 1.8 - 7.7 k/uL    Absolute Lymph # 1.80 1.0 - 4.8 k/uL    Absolute Mono # 0.60 0.1 - 1.2 k/uL    Absolute Eos # 0.30 0.0 - 0.4 k/uL    Basophils Absolute 0.00 0.0 - 0.2 k/uL    Absolute Immature Granulocyte NOT REPORTED 0.00 - 0.30 k/uL    WBC Morphology NOT REPORTED     RBC Morphology NOT REPORTED     Platelet Estimate NOT REPORTED    Comprehensive Metabolic Panel    Collection Time: 10/07/21 10:07 AM   Result Value Ref Range Glucose 67 (L) 70 - 99 mg/dL    BUN 7 6 - 20 mg/dL    CREATININE 0.52 0.50 - 0.90 mg/dL    Bun/Cre Ratio NOT REPORTED 9 - 20    Calcium 9.2 8.6 - 10.4 mg/dL    Sodium 135 135 - 144 mmol/L    Potassium 4.0 3.7 - 5.3 mmol/L    Chloride 103 98 - 107 mmol/L    CO2 26 20 - 31 mmol/L    Anion Gap 6 (L) 9 - 17 mmol/L    Alkaline Phosphatase 53 35 - 104 U/L    ALT 15 5 - 33 U/L    AST 18 <32 U/L    Total Bilirubin 0.87 0.3 - 1.2 mg/dL    Total Protein 6.6 6.4 - 8.3 g/dL    Albumin 4.4 3.5 - 5.2 g/dL    Albumin/Globulin Ratio 2.0 1.0 - 2.5    GFR Non-African American >60 >60 mL/min    GFR African American >60 >60 mL/min    GFR Comment          GFR Staging NOT REPORTED    Vitamin B12 & Folate    Collection Time: 10/07/21 10:07 AM   Result Value Ref Range    Vitamin B-12 708 232 - 1245 pg/mL    Folate 10.3 >4.8 ng/mL   MJ Screen With Reflex    Collection Time: 10/07/21 10:07 AM   Result Value Ref Range    MJ NEGATIVE NEGATIVE    BHARAT Antibodies Screen 0.2 <0.7 U/mL    Anti ds DNA 2.5 <10.0 IU/mL   Rheumatoid Factor    Collection Time: 10/07/21 10:07 AM   Result Value Ref Range    Rheumatoid Factor <10 <14 IU/mL   Path Review, Smear    Collection Time: 10/07/21 10:07 AM   Result Value Ref Range    Pathologist Review SEE REPORT    Hepatitis Panel, Acute    Collection Time: 10/07/21 10:07 AM   Result Value Ref Range    Hepatitis B Surface Ag NONREACTIVE NONREACTIVE    Hepatitis C Ab NONREACTIVE NONREACTIVE    Hep B Core Ab, IgM NONREACTIVE NONREACTIVE    Hep A IgM NONREACTIVE NONREACTIVE   Reticulocytes    Collection Time: 10/07/21 10:07 AM   Result Value Ref Range    Retic % 1.3 0.5 - 1.9 %    Absolute Retic # 0.060 0.030 - 0.080 M/uL    Immature Retic Fract 8.700 2.7 - 18.3 %    Retic Hemoglobin 36.6 (H) 28.2 - 35.7 pg   VAGINITIS DNA PROBE    Collection Time: 11/24/21 11:38 PM    Specimen: Vaginal   Result Value Ref Range    Specimen Description . VAGINA     Special Requests NOT REPORTED     Direct Exam NEGATIVE for Candida sp.     Direct Exam NEGATIVE for Gardnerella vaginalis     Direct Exam NEGATIVE for Trichomonas vaginalis     Direct Exam       Method of testing is a DNA probe intended for detection and identification of Candida species, Gardnerella vaginalis, and Trichomonas vaginalis nucleic acid in vaginal fluid specimens from patients with symptoms of vaginitis/vaginosis.          PLAN:   Contact office with any worsening pain/not resolving  Hydrocortisone tid to affected area x 5-7 days then coconut oil to moisturize  FU annual exam 2 months or sooner prn

## 2021-12-23 DIAGNOSIS — N89.8 VAGINAL ITCHING: ICD-10-CM

## 2021-12-23 LAB
DIRECT EXAM: ABNORMAL
Lab: ABNORMAL
SPECIMEN DESCRIPTION: ABNORMAL

## 2021-12-24 RX ORDER — FLUCONAZOLE 150 MG/1
150 TABLET ORAL ONCE
Qty: 1 TABLET | Refills: 0 | Status: SHIPPED | OUTPATIENT
Start: 2021-12-24 | End: 2021-12-24

## 2022-01-26 ENCOUNTER — OFFICE VISIT (OUTPATIENT)
Dept: PRIMARY CARE CLINIC | Age: 42
End: 2022-01-26
Payer: COMMERCIAL

## 2022-01-26 VITALS
SYSTOLIC BLOOD PRESSURE: 118 MMHG | HEART RATE: 67 BPM | DIASTOLIC BLOOD PRESSURE: 74 MMHG | WEIGHT: 98.6 LBS | OXYGEN SATURATION: 100 % | RESPIRATION RATE: 14 BRPM | BODY MASS INDEX: 18.03 KG/M2

## 2022-01-26 DIAGNOSIS — N92.0 MENORRHAGIA WITH REGULAR CYCLE: ICD-10-CM

## 2022-01-26 DIAGNOSIS — R10.30 LOWER ABDOMINAL PAIN: Primary | ICD-10-CM

## 2022-01-26 DIAGNOSIS — R19.7 DIARRHEA, UNSPECIFIED TYPE: ICD-10-CM

## 2022-01-26 DIAGNOSIS — R42 EPISODIC LIGHTHEADEDNESS: ICD-10-CM

## 2022-01-26 PROCEDURE — 99214 OFFICE O/P EST MOD 30 MIN: CPT | Performed by: NURSE PRACTITIONER

## 2022-01-26 RX ORDER — LOPERAMIDE HYDROCHLORIDE 2 MG/1
2 CAPSULE ORAL 4 TIMES DAILY PRN
Qty: 30 CAPSULE | Refills: 1 | Status: SHIPPED | OUTPATIENT
Start: 2022-01-26 | End: 2022-02-05

## 2022-01-26 RX ORDER — GREEN TEA/HOODIA GORDONII 315-12.5MG
1 CAPSULE ORAL 2 TIMES DAILY
Qty: 60 TABLET | Refills: 0 | Status: SHIPPED | OUTPATIENT
Start: 2022-01-26 | End: 2022-02-25

## 2022-01-26 ASSESSMENT — ENCOUNTER SYMPTOMS
SHORTNESS OF BREATH: 0
ABDOMINAL PAIN: 0
COLOR CHANGE: 0
BLOOD IN STOOL: 0
SORE THROAT: 0
CHEST TIGHTNESS: 0
VOMITING: 0
NAUSEA: 0
DIARRHEA: 1
RHINORRHEA: 0

## 2022-01-26 ASSESSMENT — PATIENT HEALTH QUESTIONNAIRE - PHQ9
SUM OF ALL RESPONSES TO PHQ QUESTIONS 1-9: 0
SUM OF ALL RESPONSES TO PHQ9 QUESTIONS 1 & 2: 0
2. FEELING DOWN, DEPRESSED OR HOPELESS: 0
1. LITTLE INTEREST OR PLEASURE IN DOING THINGS: 0

## 2022-01-26 NOTE — PATIENT INSTRUCTIONS
Patient Education        Diarrhea: Care Instructions  Overview     Diarrhea is loose, watery stools (bowel movements). The exact cause is often hard to find. Sometimes diarrhea is your body's way of getting rid of what caused an upset stomach. Viruses, food poisoning, and many medicines can cause diarrhea. Some people get diarrhea in response to emotional stress, anxiety, or certain foods. Almost everyone has diarrhea now and then. It usually isn't serious, and your stools will return to normal soon. The important thing to do is replace the fluids you have lost, so you can prevent dehydration. The doctor has checked you carefully, but problems can develop later. If you notice any problems or new symptoms, get medical treatment right away. Follow-up care is a key part of your treatment and safety. Be sure to make and go to all appointments, and call your doctor if you are having problems. It's also a good idea to know your test results and keep a list of the medicines you take. How can you care for yourself at home? · Watch for signs of dehydration, which means your body has lost too much water. Dehydration is a serious condition and should be treated right away. Signs of dehydration are:  ? Increasing thirst and dry eyes and mouth. ? Feeling faint or lightheaded. ? A smaller amount of urine than normal.  · To prevent dehydration, drink plenty of fluids. Choose water and other clear liquids until you feel better. If you have kidney, heart, or liver disease and have to limit fluids, talk with your doctor before you increase the amount of fluids you drink. · When you feel like eating, start with small amounts of food. · The doctor may recommend that you take over-the-counter medicine, such as loperamide (Imodium). Read and follow all instructions on the label. Do not use this medicine if you have bloody diarrhea, a high fever, or other signs of serious illness.  Call your doctor if you think you are having a problem with your medicine. When should you call for help? Call 911 anytime you think you may need emergency care. For example, call if:    · You passed out (lost consciousness).     · Your stools are maroon or very bloody. Call your doctor now or seek immediate medical care if:    · You are dizzy or lightheaded, or you feel like you may faint.     · Your stools are black and look like tar, or they have streaks of blood.     · You have new or worse belly pain.     · You have symptoms of dehydration, such as:  ? Dry eyes and a dry mouth. ? Passing only a little urine. ? Cannot keep fluids down.     · You have a new or higher fever. Watch closely for changes in your health, and be sure to contact your doctor if:    · Your diarrhea is getting worse.     · You see pus in the diarrhea.     · You are not getting better after 2 days (48 hours). Where can you learn more? Go to https://PlayMob.iRise. org and sign in to your [employer] account. Enter M946 in the Every1Mobile box to learn more about \"Diarrhea: Care Instructions. \"     If you do not have an account, please click on the \"Sign Up Now\" link. Current as of: July 1, 2021               Content Version: 13.1  © 2006-2021 Healthwise, Incorporated. Care instructions adapted under license by [facility] ([facility]). If you have questions about a medical condition or this instruction, always ask your healthcare professional. [place] [address] any warranty or liability for your use of this information.

## 2022-01-26 NOTE — PROGRESS NOTES
[facility] CARE  [address] [facility]  [address] [address]  Dept: [phone]  Dept Fax: [phone]    Concepción Zaidi is a 35 y.o. female who presentstoday for her medical conditions/complaints as noted below. Concepción Zaidi is c/o of  Chief Complaint   Patient presents with    Other     having to use bathroom after eating; weakness         HPI:     Here with acute complaint of loose BMs after eating, denies RUQ abdominal pain. Does at times have mid-lower abdominal pain and gurgling of bowels. Describes it like \"boiling water\". Denies BRB in stool or black tarry stool. No NV. No hx of bowel dysfunction. Does note that symptoms seemed to start after taking antibiotic for BV and having several treatments for recurrent yeast infection. At times does have formed stools. Has not tried anything otc for diarrhea. No fever, chills or other associated symptoms    Has been following with GYN for longer and heavier menstrual cycles, does feel weakness. VSS. Has f/u and will likely needs uterine US. Hx of ablation in 2020.        Hemoglobin A1C (%)   Date Value   2020 5.3   2018 5.0             ( goal A1C is < 7)   No results found for: LABMICR  LDL Cholesterol (mg/dL)   Date Value   09/10/2021 63   2020 64     LDL Calculated (mg/dL)   Date Value   2019 55       (goal LDL is <100)   AST (U/L)   Date Value   10/07/2021 18     ALT (U/L)   Date Value   10/07/2021 15     BUN (mg/dL)   Date Value   10/07/2021 7     BP Readings from Last 3 Encounters:   22 118/74   21 108/60   21 120/64          ([id number])    Past Medical History:   Diagnosis Date    Dorsal back pain 2013    DUB (dysfunctional uterine bleeding) 2013    Menorrhagia 10/20/2014    Other specified fetal and placental problems affecting management of mother, antepartum 2012    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[address]     Suspected placental problem not found 2012  Thrombocytopenia ([facility]     Uterine leiomyoma 2018    Vitamin D deficiency       Past Surgical History:   Procedure Laterality Date     SECTION, LOW TRANSVERSE  2004     SECTION, LOW TRANSVERSE  2008     SECTION, LOW TRANSVERSE  10/2012    DILATION AND CURETTAGE OF UTERUS  2020     DILATATION AND CURETTAGE HYSTEROSCOPY, NOVASURE    DILATION AND CURETTAGE OF UTERUS N/A 2020    DILATATION AND CURETTAGE HYSTEROSCOPY, NOVASURE performed by Samantha Kraus MD at [facility]  10/8/12       Family History   Problem Relation Age of Onset    High Blood Pressure Mother     High Blood Pressure Father        Social History     Tobacco Use    Smoking status: Never Smoker    Smokeless tobacco: Never Used   Substance Use Topics    Alcohol use: No      Current Outpatient Medications   Medication Sig Dispense Refill    loperamide (RA ANTI-DIARRHEAL) 2 MG capsule Take 1 capsule by mouth 4 times daily as needed for Diarrhea 30 capsule 1    Probiotic Acidophilus (FLORANEX) TABS Take 1 tablet by mouth 2 times daily 60 tablet 0    vitamin D (ERGOCALCIFEROL) 1.25 MG (21974 UT) CAPS capsule Take 1 capsule by mouth once a week 12 capsule 1    fluticasone (FLONASE) 50 MCG/ACT nasal spray 2 sprays by Each Nostril route daily 16 g 3    loratadine (CLARITIN) 10 MG tablet Take 1 tablet by mouth daily 30 tablet 2    fluocinolone acetonide (SYNALAR) 0.01 % external solution Use as directed 90 mL 0    EQ EYE ITCH RELIEF 0.025 % ophthalmic solution Place 1 drop into both eyes 2 times daily 10 mL 0    ibuprofen (ADVIL;MOTRIN) 800 MG tablet Take 1 tablet by mouth every 8 hours as needed for Pain 30 tablet 0    omeprazole (PRILOSEC) 20 MG delayed release capsule Take 1 capsule by mouth daily (Patient taking differently: Take 20 mg by mouth daily Indications: PRN ) 30 capsule 3     No current facility-administered medications for this visit.      No Known Allergies    Health Maintenance   Topic Date Due    DTaP/Tdap/Td vaccine (1 - Tdap) Never done    COVID-19 Vaccine (3 - Booster for Pfizer series) 09/30/2021    Flu vaccine (1) 09/20/2022 (Originally 9/1/2021)    Depression Monitoring  11/10/2022    Cervical cancer screen  08/20/2023    Lipid screen  09/10/2026    HIV screen  Completed    Hepatitis A vaccine  Aged Out    Hepatitis B vaccine  Aged Out    Hib vaccine  Aged Out    Meningococcal (ACWY) vaccine  Aged Out    Pneumococcal 0-64 years Vaccine  Aged Out    Hepatitis C screen  Discontinued       Subjective:      Review of Systems   Constitutional: Negative for activity change, fatigue and fever. HENT: Negative for congestion, rhinorrhea and sore throat. Eyes: Negative for visual disturbance. Respiratory: Negative for chest tightness and shortness of breath. Cardiovascular: Negative for chest pain and palpitations. Gastrointestinal: Positive for diarrhea. Negative for abdominal pain, blood in stool, nausea and vomiting. Endocrine: Negative for polydipsia. Genitourinary: Positive for menstrual problem. Negative for difficulty urinating. Musculoskeletal: Negative for arthralgias and myalgias. Skin: Negative for color change. Neurological: Positive for light-headedness. Negative for weakness and headaches. Psychiatric/Behavioral: Negative for behavioral problems. The patient is not nervous/anxious. All other systems reviewed and are negative. Objective:   /74   Pulse 67   Resp 14   Wt 98 lb 9.6 oz (44.7 kg)   SpO2 100%   BMI 18.03 kg/m²   Physical Exam  Vitals reviewed. Constitutional:       General: She is not in acute distress. Appearance: Normal appearance. HENT:      Head: Normocephalic. Eyes:      Pupils: Pupils are equal, round, and reactive to light. Cardiovascular:      Rate and Rhythm: Normal rate and regular rhythm. Pulses: Normal pulses. Heart sounds: Normal heart sounds. Pulmonary:      Effort: Pulmonary effort is normal.      Breath sounds: Normal breath sounds. Abdominal:      General: Bowel sounds are normal. There is no distension. Palpations: Abdomen is soft. There is no mass. Tenderness: There is no abdominal tenderness. There is no right CVA tenderness, left CVA tenderness, guarding or rebound. Musculoskeletal:         General: Normal range of motion. Cervical back: Neck supple. Right lower leg: No edema. Left lower leg: No edema. Skin:     General: Skin is warm and dry. Capillary Refill: Capillary refill takes less than 2 seconds. Neurological:      General: No focal deficit present. Mental Status: She is alert and oriented to person, place, and time. Psychiatric:         Mood and Affect: Mood normal.         Behavior: Behavior normal.           :       Diagnosis Orders   1. Lower abdominal pain  CT ABDOMEN W WO CONTRAST    Probiotic Acidophilus (FLORANEX) TABS   2. Diarrhea, unspecified type  CT ABDOMEN W WO CONTRAST    Probiotic Acidophilus (FLORANEX) TABS   3. Menorrhagia with regular cycle  CBC Auto Differential    Ferritin    Iron And TIBC   4. Episodic lightheadedness               :          1. Lower abdominal pain  2. Diarrhea, unspecified type  New, no acute abdomen, rx for immodium and probiotic. Increase fluids and fiber. CTAP to rule out acute process, low suspicion for acute appendicitis or diverticulitis. Possible gallbladder dysfunction due to worsening post-prandial symptoms. Will consider further work up or GI referral if symptoms persist or worsen, discussed red flag complaints. - CT ABDOMEN W WO CONTRAST; Future  - Probiotic Acidophilus (FLORANEX) TABS; Take 1 tablet by mouth 2 times daily  Dispense: 60 tablet; Refill: 0    3. Menorrhagia with regular cycle  4. Lightheadedness   Following with GYN. Plan for uterine US. Check labs to eval for SONJA which may be contributing to weakness/lightheadedness.

## 2022-02-10 DIAGNOSIS — R19.7 DIARRHEA, UNSPECIFIED TYPE: ICD-10-CM

## 2022-02-10 DIAGNOSIS — R10.30 LOWER ABDOMINAL PAIN: ICD-10-CM

## 2022-02-14 ENCOUNTER — TELEPHONE (OUTPATIENT)
Dept: PRIMARY CARE CLINIC | Age: 42
End: 2022-02-14

## 2022-02-14 DIAGNOSIS — R10.30 LOWER ABDOMINAL PAIN: Primary | ICD-10-CM

## 2022-02-14 DIAGNOSIS — R19.7 DIARRHEA, UNSPECIFIED TYPE: ICD-10-CM

## 2022-02-14 NOTE — TELEPHONE ENCOUNTER
CT-Abdomen was denied. Ins. Stating they would like an US or Xray done prior to show medical necessity for CT.     2/16/22-scheduled for CT    -denial letter in media tab    If provider orders U/S then precert dept. Will notify pt. When they call to schedule U/S, that CT will be canceled.

## 2022-02-22 ENCOUNTER — OFFICE VISIT (OUTPATIENT)
Dept: OBGYN CLINIC | Age: 42
End: 2022-02-22
Payer: COMMERCIAL

## 2022-02-22 VITALS
HEIGHT: 62 IN | SYSTOLIC BLOOD PRESSURE: 122 MMHG | DIASTOLIC BLOOD PRESSURE: 84 MMHG | BODY MASS INDEX: 17.55 KG/M2 | WEIGHT: 95.4 LBS

## 2022-02-22 DIAGNOSIS — Z01.419 ENCOUNTER FOR GYNECOLOGICAL EXAMINATION: Primary | ICD-10-CM

## 2022-02-22 DIAGNOSIS — Z12.31 ENCOUNTER FOR SCREENING MAMMOGRAM FOR BREAST CANCER: ICD-10-CM

## 2022-02-22 DIAGNOSIS — N92.1 IRREGULAR INTERMENSTRUAL BLEEDING: ICD-10-CM

## 2022-02-22 PROCEDURE — 99396 PREV VISIT EST AGE 40-64: CPT | Performed by: NURSE PRACTITIONER

## 2022-02-22 ASSESSMENT — ENCOUNTER SYMPTOMS
CONSTIPATION: 0
BACK PAIN: 0
ABDOMINAL DISTENTION: 0
DIARRHEA: 0
COUGH: 0
ABDOMINAL PAIN: 0
SHORTNESS OF BREATH: 0

## 2022-02-22 NOTE — PROGRESS NOTES
Never Used     Social History     Substance and Sexual Activity   Alcohol Use No     Current Outpatient Medications   Medication Sig Dispense Refill    Probiotic Acidophilus (FLORANEX) TABS Take 1 tablet by mouth 2 times daily 60 tablet 0    vitamin D (ERGOCALCIFEROL) 1.25 MG (39271 UT) CAPS capsule Take 1 capsule by mouth once a week 12 capsule 1    fluticasone (FLONASE) 50 MCG/ACT nasal spray 2 sprays by Each Nostril route daily 16 g 3    loratadine (CLARITIN) 10 MG tablet Take 1 tablet by mouth daily 30 tablet 2    fluocinolone acetonide (SYNALAR) 0.01 % external solution Use as directed 90 mL 0    EQ EYE ITCH RELIEF 0.025 % ophthalmic solution Place 1 drop into both eyes 2 times daily 10 mL 0    ibuprofen (ADVIL;MOTRIN) 800 MG tablet Take 1 tablet by mouth every 8 hours as needed for Pain 30 tablet 0    omeprazole (PRILOSEC) 20 MG delayed release capsule Take 1 capsule by mouth daily (Patient taking differently: Take 20 mg by mouth daily Indications: PRN ) 30 capsule 3     No current facility-administered medications for this visit. Allergies:  Patient has no known allergies. Gynecologic History:  No LMP recorded. (Menstrual status: Other - See Notes). Sexually Active:Yes and No  How many partners in the last 12 months- 0  Partners:   Dyspareunia: sometimes  STD History: No  Pap Smear History: 20 NILM. Cytology/cotest due   Mammogram: 1.15.21  BIRAD 2  Colonoscopy: NA  Fam Hx Breast, Ovarian, Colorectal Ca: denies    OB History    Para Term  AB Living   4 2 2 0 1 3   SAB IAB Ectopic Molar Multiple Live Births   1 0 0 0 0 3       Review of Systems   Constitutional: Negative for appetite change and fatigue. HENT: Negative for congestion and hearing loss. Eyes: Negative for visual disturbance. Respiratory: Negative for cough and shortness of breath. Cardiovascular: Negative for chest pain and palpitations.    Gastrointestinal: Negative for abdominal distention, abdominal pain, constipation and diarrhea. Genitourinary: Positive for menstrual problem (AUB). Negative for flank pain, frequency, pelvic pain and vaginal discharge. Musculoskeletal: Negative for back pain. Neurological: Negative for syncope and headaches. Psychiatric/Behavioral: Negative for behavioral problems. There were no vitals taken for this visit. Physical Exam  Constitutional:       Appearance: She is well-developed. HENT:      Head: Normocephalic. Eyes:      Extraocular Movements: Extraocular movements intact. Conjunctiva/sclera: Conjunctivae normal.   Neck:      Thyroid: No thyromegaly. Trachea: No tracheal deviation. Pulmonary:      Effort: Pulmonary effort is normal. No respiratory distress. Chest:   Breasts: Breasts are symmetrical.      Right: No inverted nipple. Left: No inverted nipple, mass, nipple discharge, skin change or tenderness. Abdominal:      General: There is no distension. Palpations: Abdomen is soft. There is no mass. Tenderness: There is no abdominal tenderness. Genitourinary:     Labia:         Right: No rash or lesion. Left: No rash or lesion. Vagina: No vaginal discharge or tenderness. Cervix: No cervical motion tenderness, discharge or friability. Uterus: Not deviated, not enlarged and not fixed. Adnexa:         Right: No mass, tenderness or fullness. Left: No mass, tenderness or fullness. Musculoskeletal:         General: No tenderness. Normal range of motion. Cervical back: Normal range of motion. Skin:     General: Skin is warm and dry. Neurological:      General: No focal deficit present. Mental Status: She is alert and oriented to person, place, and time. Mental status is at baseline. Psychiatric:         Mood and Affect: Mood normal.         Behavior: Behavior normal.         Thought Content:  Thought content normal.         Judgment: Judgment normal.                 ASSESSMENT:    35 y.o. Female; Annual   Diagnosis Orders   1. Encounter for gynecological examination     2. Encounter for screening mammogram for breast cancer  EMILY LIMA DIGITAL SCREEN BILATERAL   3. Irregular intermenstrual bleeding  US PELVIS COMPLETE     No follow-ups on file. Hereditary Breast, Ovarian, Colon and Uterine Cancer screening Done. Tobacco & Secondary smoke risks reviewed; instructed on cessation and avoidance    - Pap not collected per ASCCP guidelines.  -Discussed menopausal symptoms, HRT, incontinence. - Screening mammogram discussed and advised yearly if normalstarting at age 36.  - Calcium and Vitamin D dosing reviewed. - Colonoscopy screening reviewed. - General diet and exercise reviewed. - Routine health maintenance per patients PCP.   Pelvic US  Electronically signed by RADHA Harrell CNP on 2/22/22 at 5:48 AM [facility]    [address]

## 2022-02-23 ENCOUNTER — HOSPITAL ENCOUNTER (OUTPATIENT)
Age: 42
Setting detail: SPECIMEN
Discharge: HOME | End: 2022-02-23

## 2022-02-23 DIAGNOSIS — N92.0 MENORRHAGIA WITH REGULAR CYCLE: ICD-10-CM

## 2022-02-23 LAB
ABSOLUTE EOS #: 0.27 K/UL (ref 0–0.44)
ABSOLUTE IMMATURE GRANULOCYTE: <0.03 K/UL (ref 0–0.3)
ABSOLUTE LYMPH #: 2.14 K/UL (ref 1.1–3.7)
ABSOLUTE MONO #: 0.49 K/UL (ref 0.1–1.2)
BASOPHILS # BLD: 1 % (ref 0–2)
BASOPHILS ABSOLUTE: 0.04 K/UL (ref 0–0.2)
EOSINOPHILS RELATIVE PERCENT: 4 % (ref 1–4)
FERRITIN: 39 UG/L (ref 13–150)
HCT VFR BLD CALC: 42.5 % (ref 36.3–47.1)
HEMOGLOBIN: 13.8 G/DL (ref 11.9–15.1)
IMMATURE GRANULOCYTES: 0 %
IRON SATURATION: 22 % (ref 20–55)
IRON: 70 UG/DL (ref 37–145)
LYMPHOCYTES # BLD: 31 % (ref 24–43)
MCH RBC QN AUTO: 31.6 PG (ref 25.2–33.5)
MCHC RBC AUTO-ENTMCNC: 32.5 G/DL (ref 28.4–34.8)
MCV RBC AUTO: 97.3 FL (ref 82.6–102.9)
MONOCYTES # BLD: 7 % (ref 3–12)
NRBC AUTOMATED: 0 PER 100 WBC
PDW BLD-RTO: 13.1 % (ref 11.8–14.4)
PLATELET # BLD: 144 K/UL (ref 138–453)
PMV BLD AUTO: 11.6 FL (ref 8.1–13.5)
RBC # BLD: 4.37 M/UL (ref 3.95–5.11)
SEG NEUTROPHILS: 57 % (ref 36–65)
SEGMENTED NEUTROPHILS ABSOLUTE COUNT: 3.96 K/UL (ref 1.5–8.1)
TOTAL IRON BINDING CAPACITY: 318 UG/DL (ref 250–450)
UNSATURATED IRON BINDING CAPACITY: 248 UG/DL (ref 112–347)
WBC # BLD: 6.9 K/UL (ref 3.5–11.3)

## 2022-02-25 ENCOUNTER — OFFICE VISIT (OUTPATIENT)
Dept: PRIMARY CARE CLINIC | Age: 42
End: 2022-02-25
Payer: COMMERCIAL

## 2022-02-25 ENCOUNTER — HOSPITAL ENCOUNTER (OUTPATIENT)
Age: 42
Setting detail: SPECIMEN
Discharge: HOME | End: 2022-02-25

## 2022-02-25 VITALS
BODY MASS INDEX: 17.74 KG/M2 | OXYGEN SATURATION: 97 % | SYSTOLIC BLOOD PRESSURE: 120 MMHG | WEIGHT: 97 LBS | HEART RATE: 77 BPM | DIASTOLIC BLOOD PRESSURE: 66 MMHG

## 2022-02-25 DIAGNOSIS — M25.542 ARTHRALGIA OF BOTH HANDS: ICD-10-CM

## 2022-02-25 DIAGNOSIS — R19.7 INTERMITTENT DIARRHEA: ICD-10-CM

## 2022-02-25 DIAGNOSIS — M25.541 ARTHRALGIA OF BOTH HANDS: ICD-10-CM

## 2022-02-25 DIAGNOSIS — K90.49 FOOD INTOLERANCE IN ADULT: Primary | ICD-10-CM

## 2022-02-25 DIAGNOSIS — K90.49 FOOD INTOLERANCE IN ADULT: ICD-10-CM

## 2022-02-25 PROCEDURE — 99214 OFFICE O/P EST MOD 30 MIN: CPT | Performed by: NURSE PRACTITIONER

## 2022-02-25 RX ORDER — DICYCLOMINE HYDROCHLORIDE 10 MG/1
10 CAPSULE ORAL 4 TIMES DAILY
Qty: 90 CAPSULE | Refills: 1 | Status: ON HOLD | OUTPATIENT
Start: 2022-02-25 | End: 2022-05-16 | Stop reason: ALTCHOICE

## 2022-02-25 ASSESSMENT — ENCOUNTER SYMPTOMS
NAUSEA: 0
CONSTIPATION: 0
SORE THROAT: 0
ABDOMINAL PAIN: 1
VOMITING: 0
COLOR CHANGE: 0
CHEST TIGHTNESS: 0
BLOOD IN STOOL: 0
RHINORRHEA: 0
SHORTNESS OF BREATH: 0
ABDOMINAL DISTENTION: 0
DIARRHEA: 1

## 2022-02-25 ASSESSMENT — PATIENT HEALTH QUESTIONNAIRE - PHQ9
7. TROUBLE CONCENTRATING ON THINGS, SUCH AS READING THE NEWSPAPER OR WATCHING TELEVISION: 0
SUM OF ALL RESPONSES TO PHQ QUESTIONS 1-9: 0
8. MOVING OR SPEAKING SO SLOWLY THAT OTHER PEOPLE COULD HAVE NOTICED. OR THE OPPOSITE, BEING SO FIGETY OR RESTLESS THAT YOU HAVE BEEN MOVING AROUND A LOT MORE THAN USUAL: 0
SUM OF ALL RESPONSES TO PHQ QUESTIONS 1-9: 0
SUM OF ALL RESPONSES TO PHQ9 QUESTIONS 1 & 2: 0
1. LITTLE INTEREST OR PLEASURE IN DOING THINGS: 0
6. FEELING BAD ABOUT YOURSELF - OR THAT YOU ARE A FAILURE OR HAVE LET YOURSELF OR YOUR FAMILY DOWN: 0
4. FEELING TIRED OR HAVING LITTLE ENERGY: 0
SUM OF ALL RESPONSES TO PHQ QUESTIONS 1-9: 0
3. TROUBLE FALLING OR STAYING ASLEEP: 0
10. IF YOU CHECKED OFF ANY PROBLEMS, HOW DIFFICULT HAVE THESE PROBLEMS MADE IT FOR YOU TO DO YOUR WORK, TAKE CARE OF THINGS AT HOME, OR GET ALONG WITH OTHER PEOPLE: 0
9. THOUGHTS THAT YOU WOULD BE BETTER OFF DEAD, OR OF HURTING YOURSELF: 0
5. POOR APPETITE OR OVEREATING: 0
SUM OF ALL RESPONSES TO PHQ QUESTIONS 1-9: 0
2. FEELING DOWN, DEPRESSED OR HOPELESS: 0

## 2022-02-25 NOTE — PATIENT INSTRUCTIONS
Patient Education        dicyclomine (oral/injection)  Pronunciation:  nash KILLIAN willie swan  Brand:  Bentyl, Dibent, Dicyclocot  What is the most important information I should know about dicyclomine? Many drugs can affect dicyclomine. Tell your doctor about all your current medicines. What is dicyclomine? Dicyclomine is used to treat functional bowel or irritable bowel syndrome. Dicyclomine may also be used for purposes not listed in this medication guide. What should I discuss with my healthcare provider before taking dicyclomine? You should not use dicyclomine if you are allergic to it, or if you have:  · glaucoma;  · a bladder obstruction or other urination problems;  · a blockage in your digestive tract (stomach or intestines);  · severe ulcerative colitis;  · gastroesophageal reflux disease (GERD);  · a serious heart condition and active bleeding;  · myasthenia gravis; or  · if you are breastfeeding a baby. Not approved for use by anyone younger than 25years old. Dicyclomine should never be given to a child younger than 7 months old. Tell your doctor if you have ever had:  · heart problems or high blood pressure;  · a stroke;  · ulcerative colitis;  · an ileostomy or colostomy;  · an enlarged prostate; or  · liver or kidney disease. Older adults may be more sensitive to the effects of this medicine. Tell your doctor if you are pregnant. Do not breastfeed. How should I take dicyclomine? Follow all directions on your prescription label and read all medication guides or instruction sheets. Your doctor may occasionally change your dose. Use the medicine exactly as directed. Dicyclomine oral is taken by mouth. Measure liquid medicine with the supplied syringe or a dose-measuring device (not a kitchen spoon). Dicyclomine injection is given in a muscle if you are unable to take the medicine by mouth. Call your doctor if your symptoms do not improve after 2 weeks.   Store at room temperature away from moisture and heat. What happens if I miss a dose? Skip the missed dose and use your next dose at the regular time. Do not use two doses at one time. What happens if I overdose? Seek emergency medical attention or call the Poison Help line at 1-247.746.6330. Overdose can cause nausea, vomiting, dilated pupils, weakness or loss of movement in any part of your body, trouble swallowing, fainting, or seizure (convulsions). What should I avoid while taking dicyclomine? May cause dizziness or blurred vision. Avoid driving or hazardous activity until you know how this medicine will affect you. Avoid becoming overheated or dehydrated during exercise and in hot weather. Dicyclomine can decrease sweating and you may be more prone to heat stroke. Tell your doctor if you have a fever while taking dicyclomine. Avoid using an antacid. Antacids can make it harder for your body to absorb dicyclomine oral.  What are the possible side effects of dicyclomine? Get emergency medical help if you have signs of an allergic reaction: hives; difficult breathing; swelling of your face, lips, tongue, or throat. Call your doctor at once if you have:  · fast or slow heartbeats, pounding heartbeats or fluttering in your chest;  · confusion, agitation, hallucinations, unusual thoughts or behavior;  · problems with memory or speech;  · problems with balance or muscle movement;  · diarrhea, severe constipation, or worsening of bowel symptoms;  · trouble swallowing;  · bruising, swelling, or pain where a dicyclomine injection was given; or  · dehydration  --dizziness, confusion, feeling very thirsty, less urination or sweating. Confusion and mood or behavior changes may be more likely in older adults. Common side effects may include:  · drowsiness, dizziness, weakness, nervousness;  · blurred vision;  · dry mouth; or  · nausea. This is not a complete list of side effects and others may occur.  Call your doctor for medical advice about side effects. You may report side effects to FDA at 7-656-FDA-5430. What other drugs will affect dicyclomine? Using dicyclomine with other drugs that make you drowsy can worsen this effect. Ask your doctor before using opioid medication, a sleeping pill, a muscle relaxer, or medicine for anxiety or seizures. Tell your doctor about all your current medicines. Many drugs can affect dicyclomine, especially:  · bronchodilator asthma medication;  · cold or allergy medicine (Benadryl and others);  · glaucoma medication;  · heart medication;  · medicine to treat depression, anxiety, mood disorders, or mental illness;  · medicine to treat overactive bladder;  · medicine to treat Parkinson's disease; or  · medicine to treat stomach problems, motion sickness, or irritable bowel syndrome. This list is not complete and many other drugs may affect dicyclomine. This includes prescription and over-the-counter medicines, vitamins, and herbal products. Not all possible drug interactions are listed here. Where can I get more information? Your pharmacist can provide more information about dicyclomine. Remember, keep this and all other medicines out of the reach of children, never share your medicines with others, and use this medication only for the indication prescribed. Every effort has been made to ensure that the information provided by [address] is accurate, up-to-date, and complete, but no guarantee is made to that effect. Drug information contained herein may be time sensitive. [facility] information has been compiled for use by healthcare practitioners and consumers in the [place] and therefore [facility] does not warrant that uses outside of the [place] are appropriate, unless specifically indicated otherwise. [facility]'s drug information does not endorse drugs, diagnose patients or recommend therapy.  [facility] drug information is an informational resource designed to assist licensed healthcare practitioners in caring for their patients and/or to serve consumers viewing this service as a supplement to, and not a substitute for, the expertise, skill, knowledge and judgment of healthcare practitioners. The absence of a warning for a given drug or drug combination in no way should be construed to indicate that the drug or drug combination is safe, effective or appropriate for any given patient. [facility] does not assume any responsibility for any aspect of healthcare administered with the aid of information [facility] provides. The information contained herein is not intended to cover all possible uses, directions, precautions, warnings, drug interactions, allergic reactions, or adverse effects. If you have questions about the drugs you are taking, check with your doctor, nurse or pharmacist.  Copyright 9181-4840 [address] Version: 6.01. Revision date: 7/8/2021. Care instructions adapted under license by [facility] ([facility]). If you have questions about a medical condition or this instruction, always ask your healthcare professional. [place] [address] any warranty or liability for your use of this information.

## 2022-02-25 NOTE — PROGRESS NOTES
[address] PRIMARY CARE  [address] [facility] [address]  [address] [address]  Dept: [phone]  Dept Fax: [phone]    Esau Olszewski is a 35 y.o. female who presentstoday for her medical conditions/complaints as noted below. Esau Olszewski is c/o of  Chief Complaint   Patient presents with    Diarrhea     off anf on x 5 mo, weakness, weight loss, gluten sensitivity         HPI:     Acute complaint of persistent/intermittent diarrhea or about 5 months. Does have formed stool as well. Feels that she gets diarrhea when she has dairy or wheat products, no history of food sensitivity but she is concerned about intolerance now. Is more careful about what she is eating so has subsequently lost weight, down about 7 pounds on average. Denies dizziness or lightheadedness, no significant abdominal pain, no black tarry stool or blood in the stool, no urinary complaints. Is trying to increase fluids, does periodically have some weakness. Remains on probiotic and has been using Imodium episodically with moderate improvement. Also on daily PPI    Has intermittent hand stiffness, numbness and tingling, worse at night, feels this may be related to sleep position.  No history of carpal tunnel    Saw GYN for well woman exam, had normal mammogram.  Has episodic breast tenderness with menses, no breast abnormality      Hemoglobin A1C (%)   Date Value   2020 5.3   2018 5.0             ( goal A1C is < 7)   No results found for: LABMICR  LDL Cholesterol (mg/dL)   Date Value   09/10/2021 63   2020 64     LDL Calculated (mg/dL)   Date Value   2019 55       (goal LDL is <100)   AST (U/L)   Date Value   10/07/2021 18     ALT (U/L)   Date Value   10/07/2021 15     BUN (mg/dL)   Date Value   10/07/2021 7     BP Readings from Last 3 Encounters:   22 120/66   22 122/84   22 118/74          ([id number])    Past Medical History:   Diagnosis Date    Dorsal back pain 2013    DUB (dysfunctional uterine bleeding) 2013    Menorrhagia 10/20/2014    Other specified fetal and placental problems affecting management of mother, antepartum 2012    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 [address]     Suspected placental problem not found 2012    Thrombocytopenia ([facility] [address]     Uterine leiomyoma 2018    Vitamin D deficiency       Past Surgical History:   Procedure Laterality Date     SECTION, LOW TRANSVERSE  2004     SECTION, LOW TRANSVERSE  2008     SECTION, LOW TRANSVERSE  10/2012    DILATION AND CURETTAGE OF UTERUS N/A 2020    DILATATION AND CURETTAGE HYSTEROSCOPY, NOVASURE performed by Shawn Kumar MD at [address] [place] [address]  10/08/2012       Family History   Problem Relation Age of Onset    High Blood Pressure Mother     High Blood Pressure Father        Social History     Tobacco Use    Smoking status: Never Smoker    Smokeless tobacco: Never Used   Substance Use Topics    Alcohol use: No      Current Outpatient Medications   Medication Sig Dispense Refill    dicyclomine (BENTYL) 10 MG capsule Take 1 capsule by mouth 4 times daily 90 capsule 1    Probiotic Acidophilus (FLORANEX) TABS Take 1 tablet by mouth 2 times daily 60 tablet 0    vitamin D (ERGOCALCIFEROL) 1.25 MG (89058 UT) CAPS capsule Take 1 capsule by mouth once a week 12 capsule 1    fluticasone (FLONASE) 50 MCG/ACT nasal spray 2 sprays by Each Nostril route daily 16 g 3    loratadine (CLARITIN) 10 MG tablet Take 1 tablet by mouth daily 30 tablet 2    fluocinolone acetonide (SYNALAR) 0.01 % external solution Use as directed 90 mL 0    EQ EYE ITCH RELIEF 0.025 % ophthalmic solution Place 1 drop into both eyes 2 times daily 10 mL 0    ibuprofen (ADVIL;MOTRIN) 800 MG tablet Take 1 tablet by mouth every 8 hours as needed for Pain 30 tablet 0    omeprazole (PRILOSEC) 20 MG delayed release capsule Take 1 capsule by mouth daily (Patient taking differently: Take 20 mg by mouth daily Indications: PRN ) 30 capsule 3     No current facility-administered medications for this visit. No Known Allergies    Health Maintenance   Topic Date Due    DTaP/Tdap/Td vaccine (1 - Tdap) Never done    COVID-19 Vaccine (3 - Booster for [employer] series) 09/30/2021    Flu vaccine (1) 09/20/2022 (Originally 9/1/2021)    Depression Monitoring  01/26/2023    Cervical cancer screen  08/20/2023    Lipid screen  09/10/2026    HIV screen  Completed    Hepatitis A vaccine  Aged Out    Hepatitis B vaccine  Aged Out    Hib vaccine  Aged Out    Meningococcal (ACWY) vaccine  Aged Out    Pneumococcal 0-64 years Vaccine  Aged Out    Hepatitis C screen  Discontinued       Subjective:      Review of Systems   Constitutional: Negative for activity change, fatigue and fever. HENT: Negative for congestion, rhinorrhea and sore throat. Eyes: Negative for visual disturbance. Respiratory: Negative for chest tightness and shortness of breath. Cardiovascular: Negative for chest pain and palpitations. Gastrointestinal: Positive for abdominal pain and diarrhea. Negative for abdominal distention, blood in stool, constipation, nausea and vomiting. Endocrine: Negative for polydipsia. Genitourinary: Negative for difficulty urinating. Musculoskeletal: Positive for arthralgias (bilateral hands). Negative for myalgias. Skin: Negative for color change. Neurological: Negative for weakness and headaches. Psychiatric/Behavioral: Negative for behavioral problems. The patient is not nervous/anxious. All other systems reviewed and are negative. Objective:   /66   Pulse 77   Wt 97 lb (44 kg)   LMP 02/01/2022   SpO2 97%   BMI 17.74 kg/m²   Physical Exam  Vitals reviewed. Constitutional:       General: She is not in acute distress. Appearance: Normal appearance. HENT:      Head: Normocephalic. Eyes:      Pupils: Pupils are equal, round, and reactive to light. Cardiovascular:      Rate and Rhythm: Normal rate and regular rhythm. Pulses: Normal pulses. Heart sounds: Normal heart sounds. Pulmonary:      Effort: Pulmonary effort is normal.      Breath sounds: Normal breath sounds. Abdominal:      General: Abdomen is flat. Bowel sounds are normal. There is no distension. Palpations: Abdomen is soft. There is no mass. Tenderness: There is no abdominal tenderness. There is no right CVA tenderness, left CVA tenderness or guarding. Musculoskeletal:         General: Normal range of motion. Right hand: Normal.      Left hand: Normal.      Cervical back: Neck supple. Right lower leg: No edema. Left lower leg: No edema. Lymphadenopathy:      Cervical: No cervical adenopathy. Skin:     General: Skin is warm and dry. Capillary Refill: Capillary refill takes less than 2 seconds. Neurological:      General: No focal deficit present. Mental Status: She is alert and oriented to person, place, and time. Psychiatric:         Mood and Affect: Mood normal.         Behavior: Behavior normal.           :       Diagnosis Orders   1. Food intolerance in adult  Food Comprehensive Panel    Celiac Disease Panel   2. Intermittent diarrhea  Food Comprehensive Panel    Celiac Disease Panel   3. Arthralgia of both hands               :          1. Food intolerance in adult  2. Intermittent diarrhea  Waxing and waning, seems to be worse with certain food groups. Will check food panel and celiac panel. Trial Bentyl 4 times daily as needed, continue PPI, probiotic and diet as tolerated. Continue adequate p.o. fluid intake, VSS. Discussed if symptoms persist or worsen will refer to GI for further eval    - Food Comprehensive Panel; Future  - Celiac Disease Panel; Future      3. Arthralgia of both hands  Chronic, negative Tinel and Phalen, symptoms worse at night, likely related to sleep position.   Exam within normal limits    Return in about 1 month (around 3/25/2022) for diarrhea. Patient given educational materials - see patient instructions. Discussed use, benefit, and side effects of prescribed medications. All patient questions answered. Pt voiced understanding. Reviewed health maintenance. Instructed to continue current medications, diet and exercise. Patient agreed with treatment plan. Follow up as directed.        Electronicallysigned by RADHA Murrell CNP on 2/25/2022 at 11:09 AM

## 2022-02-26 LAB
GLIADIN DEAMINIDATED PEPTIDE AB IGA: 1.3 U/ML
GLIADIN DEAMINIDATED PEPTIDE AB IGG: <0.4 U/ML
IGA: 146 MG/DL (ref 70–400)
TISSUE TRANSGLUTAMINASE IGA: 0.4 U/ML

## 2022-02-27 LAB
ALLERGEN BARLEY IGE: <0.1 KU/L (ref 0–0.34)
ALLERGEN BEEF: <0.1 KU/L (ref 0–0.34)
ALLERGEN CABBAGE IGE: <0.1 KU/L (ref 0–0.34)
ALLERGEN CARROT IGE: <0.1 KU/L (ref 0–0.34)
ALLERGEN CHICKEN IGE: <0.1 KU/L (ref 0–0.34)
ALLERGEN CODFISH IGE: <0.1 KU/L (ref 0–0.34)
ALLERGEN CORN IGE: <0.1 KU/L (ref 0–0.34)
ALLERGEN COW MILK IGE: <0.1 KU/L (ref 0–0.34)
ALLERGEN CRAB IGE: <0.1 KU/L (ref 0–0.34)
ALLERGEN EGG WHITE IGE: <0.1 KU/L (ref 0–0.34)
ALLERGEN GRAPE IGE: <0.1 KU/L (ref 0–0.34)
ALLERGEN LETTUCE IGE: <0.1 KU/L (ref 0–0.34)
ALLERGEN NAVY BEAN: <0.1 KU/L (ref 0–0.34)
ALLERGEN OAT: <0.1 KU/L (ref 0–0.34)
ALLERGEN ORANGE IGE: <0.1 KU/L (ref 0–0.34)
ALLERGEN PEANUT (F13) IGE: <0.1 KU/L (ref 0–0.34)
ALLERGEN PEPPER C. ANNUUM IGE: <0.1 KU/L (ref 0–0.34)
ALLERGEN PORK: <0.1 KU/L (ref 0–0.34)
ALLERGEN RICE IGE: <0.1 KU/L (ref 0–0.34)
ALLERGEN RYE IGE: <0.1 KU/L (ref 0–0.34)
ALLERGEN SOYBEAN IGE: <0.1 KU/L (ref 0–0.34)
ALLERGEN TOMATO IGE: <0.1 KU/L (ref 0–0.34)
ALLERGEN TUNA IGE: <0.1 KU/L (ref 0–0.34)
ALLERGEN WHEAT IGE: <0.1 KU/L (ref 0–0.34)
IGE: 302 IU/ML
POTATO, IGE: <0.1 KU/L (ref 0–0.34)
SHRIMP: <0.1 KU/L (ref 0–0.34)

## 2022-03-02 DIAGNOSIS — T78.1XXA GASTROINTESTINAL FOOD SENSITIVITY: ICD-10-CM

## 2022-03-02 DIAGNOSIS — R76.8 ELEVATED IGE LEVEL: Primary | ICD-10-CM

## 2022-03-24 ENCOUNTER — TELEPHONE (OUTPATIENT)
Dept: OBGYN CLINIC | Age: 42
End: 2022-03-24

## 2022-03-24 NOTE — TELEPHONE ENCOUNTER
RUTHIE--  Pt arrived at office today for an appt that was previously canceled on 03/03/2022 for review of Us. Per notes I offered pt an appt for surgery Consult w/ Dr. Morelia Walter on 05/18/2022 @ 10:15am her 1st available. Pt Declined. Pt was offered appt w/ Dr. Howard Renee for 05/04 @10:30am which she declined stating both appts were too far away. I offered to look at 's schedule she declined.

## 2022-03-25 ENCOUNTER — OFFICE VISIT (OUTPATIENT)
Dept: PRIMARY CARE CLINIC | Age: 42
End: 2022-03-25
Payer: COMMERCIAL

## 2022-03-25 VITALS
WEIGHT: 92 LBS | HEART RATE: 73 BPM | DIASTOLIC BLOOD PRESSURE: 72 MMHG | BODY MASS INDEX: 16.83 KG/M2 | SYSTOLIC BLOOD PRESSURE: 108 MMHG | OXYGEN SATURATION: 98 %

## 2022-03-25 DIAGNOSIS — R19.7 DIARRHEA, UNSPECIFIED TYPE: ICD-10-CM

## 2022-03-25 DIAGNOSIS — R20.2 INTERMITTENT TINGLING SENSATION OF LEFT HAND AND FOOT: ICD-10-CM

## 2022-03-25 DIAGNOSIS — T78.1XXA GASTROINTESTINAL FOOD SENSITIVITY: Primary | ICD-10-CM

## 2022-03-25 DIAGNOSIS — R63.4 WEIGHT LOSS: ICD-10-CM

## 2022-03-25 PROCEDURE — 99214 OFFICE O/P EST MOD 30 MIN: CPT | Performed by: NURSE PRACTITIONER

## 2022-03-25 ASSESSMENT — PATIENT HEALTH QUESTIONNAIRE - PHQ9
1. LITTLE INTEREST OR PLEASURE IN DOING THINGS: 1
2. FEELING DOWN, DEPRESSED OR HOPELESS: 1
3. TROUBLE FALLING OR STAYING ASLEEP: 0
SUM OF ALL RESPONSES TO PHQ QUESTIONS 1-9: 6
SUM OF ALL RESPONSES TO PHQ QUESTIONS 1-9: 6
10. IF YOU CHECKED OFF ANY PROBLEMS, HOW DIFFICULT HAVE THESE PROBLEMS MADE IT FOR YOU TO DO YOUR WORK, TAKE CARE OF THINGS AT HOME, OR GET ALONG WITH OTHER PEOPLE: 1
SUM OF ALL RESPONSES TO PHQ9 QUESTIONS 1 & 2: 2
6. FEELING BAD ABOUT YOURSELF - OR THAT YOU ARE A FAILURE OR HAVE LET YOURSELF OR YOUR FAMILY DOWN: 0
7. TROUBLE CONCENTRATING ON THINGS, SUCH AS READING THE NEWSPAPER OR WATCHING TELEVISION: 2
9. THOUGHTS THAT YOU WOULD BE BETTER OFF DEAD, OR OF HURTING YOURSELF: 0
SUM OF ALL RESPONSES TO PHQ QUESTIONS 1-9: 6
5. POOR APPETITE OR OVEREATING: 2
4. FEELING TIRED OR HAVING LITTLE ENERGY: 0
8. MOVING OR SPEAKING SO SLOWLY THAT OTHER PEOPLE COULD HAVE NOTICED. OR THE OPPOSITE, BEING SO FIGETY OR RESTLESS THAT YOU HAVE BEEN MOVING AROUND A LOT MORE THAN USUAL: 0
SUM OF ALL RESPONSES TO PHQ QUESTIONS 1-9: 6

## 2022-03-25 ASSESSMENT — ENCOUNTER SYMPTOMS
SORE THROAT: 0
RHINORRHEA: 0
COLOR CHANGE: 0
VOMITING: 0
NAUSEA: 0
CHEST TIGHTNESS: 0
SHORTNESS OF BREATH: 0
ABDOMINAL PAIN: 0

## 2022-03-25 NOTE — PATIENT INSTRUCTIONS
Patient Education        Diarrhea: Care Instructions  Overview     Diarrhea is loose, watery stools (bowel movements). The exact cause is often hard to find. Sometimes diarrhea is your body's way of getting rid of what caused an upset stomach. Viruses, food poisoning, and many medicines can cause diarrhea. Some people get diarrhea in response to emotional stress, anxiety, or certain foods. Almost everyone has diarrhea now and then. It usually isn't serious, and your stools will return to normal soon. The important thing to do is replace the fluids you have lost, so you can prevent dehydration. The doctor has checked you carefully, but problems can develop later. If you notice any problems or new symptoms, get medical treatment right away. Follow-up care is a key part of your treatment and safety. Be sure to make and go to all appointments, and call your doctor if you are having problems. It's also a good idea to know your test results and keep a list of the medicines you take. How can you care for yourself at home? · Watch for signs of dehydration, which means your body has lost too much water. Dehydration is a serious condition and should be treated right away. Signs of dehydration are:  ? Increasing thirst and dry eyes and mouth. ? Feeling faint or lightheaded. ? A smaller amount of urine than normal.  · To prevent dehydration, drink plenty of fluids. Choose water and other clear liquids until you feel better. If you have kidney, heart, or liver disease and have to limit fluids, talk with your doctor before you increase the amount of fluids you drink. · When you feel like eating, start with small amounts of food. · The doctor may recommend that you take over-the-counter medicine, such as loperamide (Imodium). Read and follow all instructions on the label. Do not use this medicine if you have bloody diarrhea, a high fever, or other signs of serious illness.  Call your doctor if you think you are having a problem with your medicine. When should you call for help? Call 911 anytime you think you may need emergency care. For example, call if:    · You passed out (lost consciousness).     · Your stools are maroon or very bloody. Call your doctor now or seek immediate medical care if:    · You are dizzy or lightheaded, or you feel like you may faint.     · Your stools are black and look like tar, or they have streaks of blood.     · You have new or worse belly pain.     · You have symptoms of dehydration, such as:  ? Dry eyes and a dry mouth. ? Passing only a little urine. ? Cannot keep fluids down.     · You have a new or higher fever. Watch closely for changes in your health, and be sure to contact your doctor if:    · Your diarrhea is getting worse.     · You see pus in the diarrhea.     · You are not getting better after 2 days (48 hours). Where can you learn more? Go to [email or web address] org and sign in to your [employer] account. Enter W728 in the [employer] box to learn more about \"Diarrhea: Care Instructions. \"     If you do not have an account, please click on the \"Sign Up Now\" link. Current as of: July 1, 2021               Content Version: 13.1  © 2006-2021 [employer]wise, Incorporated. Care instructions adapted under license by [facility] ([facility]). If you have questions about a medical condition or this instruction, always ask your healthcare professional. [place] [address] any warranty or liability for your use of this information.

## 2022-03-25 NOTE — PROGRESS NOTES
[facility] CARE  [facility] [address]  [address] [address]  Dept: [phone]  Dept Fax: [phone]    Bishnu Tobar is a 35 y.o. female who presentstoday for her medical conditions/complaints as noted below. Bishnu Tobar is c/o of  Chief Complaint   Patient presents with    Diarrhea     has changed diet to less gluten and dairy     Tingling     L hand, L foot at night         HPI:     Here today for c/o continued diarrhea  Pt has lost 5 additional lbs since OV 2/25/22. She has tried to avoid gluten and dairy products as these have caused an increase in GI irritation but food intolerance and celiac panels were WNL. She denies nausea/vomiting, is increasing her fluid intake to compensate for the diarrhea. No abdominal pain, denies fevers. Still has intermittent formed stool at times. No recent travel. Also c/o intermittent left arm and foot numbness, primarily when sleeping and resolves with position change or massage of extremity. Denies associated pain. Has occasional back pain, denies injury/trauma.  No weakness in BUE or BLE extremities, no saddle parasthesia or bowel or bladder incontinence       Hemoglobin A1C (%)   Date Value   09/24/2020 5.3   04/09/2018 5.0             ( goal A1C is < 7)   No results found for: LABMICR  LDL Cholesterol (mg/dL)   Date Value   09/10/2021 63   09/24/2020 64     LDL Calculated (mg/dL)   Date Value   06/11/2019 55       (goal LDL is <100)   AST (U/L)   Date Value   10/07/2021 18     ALT (U/L)   Date Value   10/07/2021 15     BUN (mg/dL)   Date Value   10/07/2021 7     BP Readings from Last 3 Encounters:   03/25/22 108/72   02/25/22 120/66   02/22/22 122/84          ([id number])    Past Medical History:   Diagnosis Date    Dorsal back pain 6/23/2013    DUB (dysfunctional uterine bleeding) 6/23/2013    Menorrhagia 10/20/2014    Other specified fetal and placental problems affecting management of mother, antepartum 2012    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[facility] [address]     Suspected placental problem not found 2012    Thrombocytopenia ([facility] [address]     Uterine leiomyoma 2018    Vitamin D deficiency       Past Surgical History:   Procedure Laterality Date     SECTION, LOW TRANSVERSE  2004     SECTION, LOW TRANSVERSE  2008     SECTION, LOW TRANSVERSE  10/2012    DILATION AND CURETTAGE OF UTERUS N/A 2020    DILATATION AND CURETTAGE HYSTEROSCOPY, NOVASURE performed by Karen Bustos MD at [address] [place] [address]  10/08/2012       Family History   Problem Relation Age of Onset    High Blood Pressure Mother     High Blood Pressure Father        Social History     Tobacco Use    Smoking status: Never Smoker    Smokeless tobacco: Never Used   Substance Use Topics    Alcohol use: No      Current Outpatient Medications   Medication Sig Dispense Refill    dicyclomine (BENTYL) 10 MG capsule Take 1 capsule by mouth 4 times daily 90 capsule 1    vitamin D (ERGOCALCIFEROL) 1.25 MG (33237 UT) CAPS capsule Take 1 capsule by mouth once a week 12 capsule 1    fluticasone (FLONASE) 50 MCG/ACT nasal spray 2 sprays by Each Nostril route daily 16 g 3    loratadine (CLARITIN) 10 MG tablet Take 1 tablet by mouth daily 30 tablet 2    fluocinolone acetonide (SYNALAR) 0.01 % external solution Use as directed 90 mL 0    EQ EYE ITCH RELIEF 0.025 % ophthalmic solution Place 1 drop into both eyes 2 times daily 10 mL 0    ibuprofen (ADVIL;MOTRIN) 800 MG tablet Take 1 tablet by mouth every 8 hours as needed for Pain 30 tablet 0    omeprazole (PRILOSEC) 20 MG delayed release capsule Take 1 capsule by mouth daily (Patient taking differently: Take 20 mg by mouth daily Indications: PRN ) 30 capsule 3     No current facility-administered medications for this visit.      No Known Allergies    Health Maintenance   Topic Date Due    DTaP/Tdap/Td vaccine (1 - Tdap) Never done    COVID-19 Vaccine (3 - Booster for [employer] series) 09/30/2021    Flu vaccine (1) 09/20/2022 (Originally 9/1/2021)    Depression Monitoring  03/25/2023    Cervical cancer screen  08/20/2023    Lipid screen  09/10/2026    HIV screen  Completed    Hepatitis A vaccine  Aged Out    Hepatitis B vaccine  Aged Out    Hib vaccine  Aged Out    Meningococcal (ACWY) vaccine  Aged Out    Pneumococcal 0-64 years Vaccine  Aged Out    Hepatitis C screen  Discontinued       Subjective:      Review of Systems   Constitutional: Negative for activity change, fatigue and fever. HENT: Negative for congestion, rhinorrhea and sore throat. Eyes: Negative for visual disturbance. Respiratory: Negative for chest tightness and shortness of breath. Cardiovascular: Negative for chest pain and palpitations. Gastrointestinal: Positive for diarrhea. Negative for abdominal pain, blood in stool, constipation, nausea and vomiting. Endocrine: Negative for polydipsia. Genitourinary: Negative for difficulty urinating. Musculoskeletal: Positive for arthralgias. Negative for myalgias. Skin: Negative for color change. Neurological: Positive for numbness. Negative for weakness and headaches. Psychiatric/Behavioral: Negative for behavioral problems. The patient is not nervous/anxious. All other systems reviewed and are negative. Objective:   /72   Pulse 73   Wt 92 lb (41.7 kg)   SpO2 98%   BMI 16.83 kg/m²   Physical Exam  Vitals reviewed. Constitutional:       General: She is not in acute distress. Appearance: Normal appearance. HENT:      Head: Normocephalic. Eyes:      Pupils: Pupils are equal, round, and reactive to light. Cardiovascular:      Rate and Rhythm: Normal rate and regular rhythm. Pulses: Normal pulses. Heart sounds: Normal heart sounds. Pulmonary:      Effort: Pulmonary effort is normal.      Breath sounds: Normal breath sounds. Abdominal:      General: Bowel sounds are normal. There is no distension. Palpations: Abdomen is soft. There is no fluid wave or mass. Tenderness: There is no abdominal tenderness. There is no right CVA tenderness or left CVA tenderness. Musculoskeletal:         General: Normal range of motion. Right upper arm: Normal.      Left upper arm: Normal.      Cervical back: Normal.      Thoracic back: Normal.      Lumbar back: Normal.      Right lower leg: Normal. No edema. Left lower leg: Normal. No edema. Skin:     General: Skin is warm and dry. Capillary Refill: Capillary refill takes less than 2 seconds. Neurological:      General: No focal deficit present. Mental Status: She is alert and oriented to person, place, and time. Psychiatric:         Mood and Affect: Mood normal.         Behavior: Behavior normal.           :       Diagnosis Orders   1. Gastrointestinal food sensitivity  MATT - Cassy Sotelo MD, Gastroenterology, [facility]   2. Diarrhea, unspecified type  MATT - Cassy Sotelo MD, Gastroenterology, [facility]    Gastrointestinal Panel, Molecular    O&P Panel ([employer]) #1   3. Intermittent tingling sensation of left hand and foot     4. Weight loss               :          1. Gastrointestinal food sensitivity  2. Diarrhea  3. Weight loss  Waxing and waning, GI referral given for further eval of persistent diarrhea and food intolerance, likely need colonoscopy. Pt advised to reintroduce foods she has stopped eating since there has been little effect on diarrhea. Will plan for stool cultures and O&P. Low concern for C. Diff as she is having formed stool intermittently. Add electrolyte replacement, multivitamin, and increased protein/nutritional supplement. PRN otc antidiarrheal as needed. - Eunice Perez MD, Gastroenterology, [facility]    4. Intermittent tingling sensation of left hand and foot  New, seems positional since only happening at night when laying and resolves with position change. Continue to monitor.        Return if symptoms worsen or fail to improve. Patient given educational materials - see patient instructions. Discussed use, benefit, and side effects of prescribed medications. All patient questions answered. Pt voiced understanding. Reviewed health maintenance. Instructed to continue current medications, diet and exercise. Patient agreed with treatment plan. Follow up as directed.        Electronicallysigned by RADHA Lima CNP on 3/26/2022 at 7:17 AM

## 2022-03-26 ENCOUNTER — TELEPHONE (OUTPATIENT)
Dept: PRIMARY CARE CLINIC | Age: 42
End: 2022-03-26

## 2022-03-26 ASSESSMENT — ENCOUNTER SYMPTOMS
BLOOD IN STOOL: 0
CONSTIPATION: 0
DIARRHEA: 1

## 2022-03-28 ENCOUNTER — TELEPHONE (OUTPATIENT)
Dept: PRIMARY CARE CLINIC | Age: 42
End: 2022-03-28

## 2022-03-28 DIAGNOSIS — R10.84 GENERALIZED ABDOMINAL PAIN: ICD-10-CM

## 2022-03-28 DIAGNOSIS — R19.7 DIARRHEA, UNSPECIFIED TYPE: Primary | ICD-10-CM

## 2022-03-28 DIAGNOSIS — R63.4 WEIGHT LOSS: ICD-10-CM

## 2022-03-28 NOTE — TELEPHONE ENCOUNTER
[facility] [employer] called in stating that the abdomen xray needs to specify which organ and to say \"limited\". Please advise and writer can put order in. Thank you.

## 2022-03-30 ENCOUNTER — HOSPITAL ENCOUNTER (OUTPATIENT)
Age: 42
Setting detail: SPECIMEN
Discharge: HOME | End: 2022-03-30

## 2022-03-30 DIAGNOSIS — R19.7 DIARRHEA, UNSPECIFIED TYPE: ICD-10-CM

## 2022-03-30 LAB
ABSOLUTE EOS #: 0.26 K/UL (ref 0–0.44)
ABSOLUTE IMMATURE GRANULOCYTE: 0.03 K/UL (ref 0–0.3)
ABSOLUTE LYMPH #: 2.06 K/UL (ref 1.1–3.7)
ABSOLUTE MONO #: 0.56 K/UL (ref 0.1–1.2)
ALBUMIN SERPL-MCNC: 4.5 G/DL (ref 3.5–5.2)
ALBUMIN/GLOBULIN RATIO: 2.1 (ref 1–2.5)
ALP BLD-CCNC: 53 U/L (ref 35–104)
ALT SERPL-CCNC: 14 U/L (ref 5–33)
ANION GAP SERPL CALCULATED.3IONS-SCNC: 13 MMOL/L (ref 9–17)
AST SERPL-CCNC: 15 U/L
BASOPHILS # BLD: 1 % (ref 0–2)
BASOPHILS ABSOLUTE: 0.05 K/UL (ref 0–0.2)
BILIRUB SERPL-MCNC: 0.88 MG/DL (ref 0.3–1.2)
BUN BLDV-MCNC: 5 MG/DL (ref 6–20)
CALCIUM SERPL-MCNC: 9.2 MG/DL (ref 8.6–10.4)
CHLORIDE BLD-SCNC: 104 MMOL/L (ref 98–107)
CO2: 23 MMOL/L (ref 20–31)
CREAT SERPL-MCNC: 0.52 MG/DL (ref 0.5–0.9)
EOSINOPHILS RELATIVE PERCENT: 3 % (ref 1–4)
GFR AFRICAN AMERICAN: >60 ML/MIN
GFR NON-AFRICAN AMERICAN: >60 ML/MIN
GFR SERPL CREATININE-BSD FRML MDRD: ABNORMAL ML/MIN/{1.73_M2}
GLUCOSE BLD-MCNC: 91 MG/DL (ref 70–99)
HCT VFR BLD CALC: 39.1 % (ref 36.3–47.1)
HEMOGLOBIN: 12.9 G/DL (ref 11.9–15.1)
IMMATURE GRANULOCYTES: 0 %
LYMPHOCYTES # BLD: 27 % (ref 24–43)
MCH RBC QN AUTO: 30.6 PG (ref 25.2–33.5)
MCHC RBC AUTO-ENTMCNC: 33 G/DL (ref 28.4–34.8)
MCV RBC AUTO: 92.9 FL (ref 82.6–102.9)
MONOCYTES # BLD: 7 % (ref 3–12)
NRBC AUTOMATED: 0 PER 100 WBC
PDW BLD-RTO: 12.7 % (ref 11.8–14.4)
PLATELET # BLD: 150 K/UL (ref 138–453)
PMV BLD AUTO: 11.6 FL (ref 8.1–13.5)
POTASSIUM SERPL-SCNC: 4.9 MMOL/L (ref 3.7–5.3)
RBC # BLD: 4.21 M/UL (ref 3.95–5.11)
SEG NEUTROPHILS: 62 % (ref 36–65)
SEGMENTED NEUTROPHILS ABSOLUTE COUNT: 4.73 K/UL (ref 1.5–8.1)
SODIUM BLD-SCNC: 140 MMOL/L (ref 135–144)
TOTAL PROTEIN: 6.6 G/DL (ref 6.4–8.3)
WBC # BLD: 7.7 K/UL (ref 3.5–11.3)

## 2022-03-31 ENCOUNTER — HOSPITAL ENCOUNTER (OUTPATIENT)
Age: 42
Setting detail: SPECIMEN
Discharge: HOME | End: 2022-03-31
Payer: COMMERCIAL

## 2022-03-31 DIAGNOSIS — R19.7 DIARRHEA, UNSPECIFIED TYPE: ICD-10-CM

## 2022-03-31 PROCEDURE — 87506 IADNA-DNA/RNA PROBE TQ 6-11: CPT

## 2022-03-31 PROCEDURE — 87328 CRYPTOSPORIDIUM AG IA: CPT

## 2022-03-31 PROCEDURE — 87329 GIARDIA AG IA: CPT

## 2022-04-01 LAB
CRYPTOSPORIDIUM ANTIGEN STOOL: NEGATIVE
GIARDIA ANTIGEN STOOL: NEGATIVE
SOURCE: NORMAL

## 2022-04-02 LAB
CAMPYLOBACTER PCR: NORMAL
E COLI ENTEROTOXIGENIC PCR: NORMAL
PLESIOMONAS SHIGELLOIDES PCR: NORMAL
SALMONELLA PCR: NORMAL
SHIGATOXIN GENE PCR: NORMAL
SHIGELLA SP PCR: NORMAL
SPECIMEN DESCRIPTION: NORMAL
VIBRIO PCR: NORMAL
YERSINIA ENTEROCOLITICA PCR: NORMAL

## 2022-04-04 ENCOUNTER — OFFICE VISIT (OUTPATIENT)
Dept: GASTROENTEROLOGY | Age: 42
End: 2022-04-04
Payer: COMMERCIAL

## 2022-04-04 ENCOUNTER — TELEPHONE (OUTPATIENT)
Dept: PRIMARY CARE CLINIC | Age: 42
End: 2022-04-04

## 2022-04-04 VITALS
TEMPERATURE: 96.3 F | HEIGHT: 62 IN | WEIGHT: 95 LBS | DIASTOLIC BLOOD PRESSURE: 72 MMHG | BODY MASS INDEX: 17.48 KG/M2 | HEART RATE: 81 BPM | SYSTOLIC BLOOD PRESSURE: 120 MMHG

## 2022-04-04 DIAGNOSIS — R63.4 WEIGHT LOSS: ICD-10-CM

## 2022-04-04 DIAGNOSIS — K21.9 GASTROESOPHAGEAL REFLUX DISEASE, UNSPECIFIED WHETHER ESOPHAGITIS PRESENT: ICD-10-CM

## 2022-04-04 DIAGNOSIS — R10.84 GENERALIZED ABDOMINAL PAIN: ICD-10-CM

## 2022-04-04 DIAGNOSIS — R19.7 DIARRHEA, UNSPECIFIED TYPE: Primary | ICD-10-CM

## 2022-04-04 PROCEDURE — 99204 OFFICE O/P NEW MOD 45 MIN: CPT | Performed by: INTERNAL MEDICINE

## 2022-04-04 RX ORDER — BISACODYL 5 MG
TABLET, DELAYED RELEASE (ENTERIC COATED) ORAL
Qty: 4 TABLET | Refills: 0 | Status: ON HOLD | OUTPATIENT
Start: 2022-04-04 | End: 2022-05-16 | Stop reason: ALTCHOICE

## 2022-04-04 RX ORDER — POLYETHYLENE GLYCOL 3350 17 G/17G
POWDER, FOR SOLUTION ORAL
Qty: 238 G | Refills: 0 | Status: ON HOLD | OUTPATIENT
Start: 2022-04-04 | End: 2022-05-16 | Stop reason: ALTCHOICE

## 2022-04-04 ASSESSMENT — ENCOUNTER SYMPTOMS
COUGH: 0
WHEEZING: 0
VOMITING: 0
SHORTNESS OF BREATH: 0
TROUBLE SWALLOWING: 0
ABDOMINAL PAIN: 0
RECTAL PAIN: 0
DIARRHEA: 1
ANAL BLEEDING: 0
CHOKING: 0
ABDOMINAL DISTENTION: 0
NAUSEA: 0
BLOOD IN STOOL: 0
CONSTIPATION: 0

## 2022-04-04 NOTE — TELEPHONE ENCOUNTER
PT called back and said she needs a referral placed to Dr. Aneudy Lynn in [place], she has an appt with them today at 2:15 and they will need referral and ov notes. Please place, thank you.     [facility] [phone]  Fax [phone]

## 2022-04-04 NOTE — TELEPHONE ENCOUNTER
Pt called stating she needs a new referral for gastroenterology, she states that Dr. Lackey Mi office does not take [employer]. Writer advise pt to contact her insurance company to clarify who is in network for her plan, then to contact the office with the name of the provider she would like to see and a referral would be placed. Pt verbalized understanding of writer's instructions.

## 2022-04-04 NOTE — PROGRESS NOTES
MEDICATIONS:    Current Outpatient Medications:     dicyclomine (BENTYL) 10 MG capsule, Take 1 capsule by mouth 4 times daily (Patient not taking: Reported on 4/4/2022), Disp: 90 capsule, Rfl: 1    vitamin D (ERGOCALCIFEROL) 1.25 MG (24317 UT) CAPS capsule, Take 1 capsule by mouth once a week, Disp: 12 capsule, Rfl: 1    fluticasone (FLONASE) 50 MCG/ACT nasal spray, 2 sprays by Each Nostril route daily, Disp: 16 g, Rfl: 3    loratadine (CLARITIN) 10 MG tablet, Take 1 tablet by mouth daily, Disp: 30 tablet, Rfl: 2    fluocinolone acetonide (SYNALAR) 0.01 % external solution, Use as directed, Disp: 90 mL, Rfl: 0    EQ EYE ITCH RELIEF 0.025 % ophthalmic solution, Place 1 drop into both eyes 2 times daily, Disp: 10 mL, Rfl: 0    ibuprofen (ADVIL;MOTRIN) 800 MG tablet, Take 1 tablet by mouth every 8 hours as needed for Pain, Disp: 30 tablet, Rfl: 0    omeprazole (PRILOSEC) 20 MG delayed release capsule, Take 1 capsule by mouth daily (Patient taking differently: Take 20 mg by mouth daily Indications: PRN ), Disp: 30 capsule, Rfl: 3    ALLERGIES:   Allergies   Allergen Reactions    Seasonal        FAMILY HISTORY:       Problem Relation Age of Onset    High Blood Pressure Mother     High Blood Pressure Father          SOCIAL HISTORY:   Social History     Socioeconomic History    Marital status:      Spouse name: Not on file    Number of children: Not on file    Years of education: Not on file    Highest education level: Not on file   Occupational History    Not on file   Tobacco Use    Smoking status: Never Smoker    Smokeless tobacco: Never Used   Vaping Use    Vaping Use: Never used   Substance and Sexual Activity    Alcohol use: No    Drug use: No    Sexual activity: Yes     Partners: Male     Birth control/protection: Surgical     Comment: tubal   Other Topics Concern    Not on file   Social History Narrative    Not on file     Social Determinants of Health     Financial Resource Strain: Low Risk     Difficulty of Paying Living Expenses: Not hard at all   Food Insecurity: No Food Insecurity    Worried About Running Out of Food in the Last Year: Never true    Mark Anthony of Food in the Last Year: Never true   Transportation Needs:     Lack of Transportation (Medical): Not on file    Lack of Transportation (Non-Medical): Not on file   Physical Activity:     Days of Exercise per Week: Not on file    Minutes of Exercise per Session: Not on file   Stress:     Feeling of Stress : Not on file   Social Connections:     Frequency of Communication with Friends and Family: Not on file    Frequency of Social Gatherings with Friends and Family: Not on file    Attends [employer] Services: Not on file    Active Member of [address] [place] [address] or Organizations: Not on file    Attends Club or Organization Meetings: Not on file    Marital Status: Not on file   Intimate Partner Violence:     Fear of Current or Ex-Partner: Not on file    Emotionally Abused: Not on file    Physically Abused: Not on file    Sexually Abused: Not on file   Housing Stability:     Unable to Pay for Housing in the Last Year: Not on file    Number of [place] in the Last Year: Not on file    Unstable Housing in the Last Year: Not on file       REVIEW OF SYSTEMS: A 12-point review of systemswas obtained and pertinent positives and negatives were enumerated above in the history of present illness. All other reviewed systems / symptoms were negative. Review of Systems   Constitutional: Negative for appetite change, fatigue and unexpected weight change. HENT: Negative for trouble swallowing. Respiratory: Negative for cough, choking, shortness of breath and wheezing. Cardiovascular: Negative for chest pain, palpitations and leg swelling. Gastrointestinal: Positive for diarrhea. Negative for abdominal distention, abdominal pain, anal bleeding, blood in stool, constipation, nausea, rectal pain and vomiting.         Heartburn Genitourinary: Negative for difficulty urinating. Allergic/Immunologic: Negative for environmental allergies and food allergies. Neurological: Negative for dizziness, weakness, light-headedness, numbness and headaches. Hematological: Does not bruise/bleed easily. Psychiatric/Behavioral: Negative for sleep disturbance. The patient is not nervous/anxious. LABORATORY DATA: Reviewed  Lab Results   Component Value Date    WBC 7.7 03/30/2022    HGB 12.9 03/30/2022    HCT 39.1 03/30/2022    MCV 92.9 03/30/2022     03/30/2022     03/30/2022    K 4.9 03/30/2022     03/30/2022    CO2 23 03/30/2022    BUN 5 (L) 03/30/2022    CREATININE 0.52 03/30/2022    LABALBU 4.5 03/30/2022    BILITOT 0.88 03/30/2022    ALKPHOS 53 03/30/2022    AST 15 03/30/2022    ALT 14 03/30/2022    INR 1.0 06/11/2019         Lab Results   Component Value Date    RBC 4.21 03/30/2022    HGB 12.9 03/30/2022    MCV 92.9 03/30/2022    MCH 30.6 03/30/2022    MCHC 33.0 03/30/2022    RDW 12.7 03/30/2022    MPV 11.6 03/30/2022    BASOPCT 1 03/30/2022    LYMPHSABS 2.06 03/30/2022    MONOSABS 0.56 03/30/2022    NEUTROABS 4.73 03/30/2022    EOSABS 0.26 03/30/2022    BASOSABS 0.05 03/30/2022         DIAGNOSTIC TESTING:     No results found. /72   Pulse 81   Temp 96.3 °F (35.7 °C)   Ht 5' 2\" (1.575 m)   Wt 95 lb (43.1 kg)   BMI 17.38 kg/m²     PHYSICAL EXAMINATION: Vital signs reviewed per the nursing documentation. Body mass index is 17.38 kg/m². Physical Exam  Vitals and nursing note reviewed. Constitutional:       General: She is not in acute distress. Appearance: She is well-developed. She is not diaphoretic. HENT:      Head: Normocephalic. Mouth/Throat:      Pharynx: No oropharyngeal exudate. Eyes:      General: No scleral icterus. Pupils: Pupils are equal, round, and reactive to light. Neck:      Thyroid: No thyromegaly. Vascular: No JVD.       Trachea: No tracheal deviation. Cardiovascular:      Rate and Rhythm: Normal rate and regular rhythm. Heart sounds: Normal heart sounds. No murmur heard. Pulmonary:      Effort: Pulmonary effort is normal. No respiratory distress. Breath sounds: Normal breath sounds. No wheezing. Abdominal:      General: Bowel sounds are normal. There is no distension. Palpations: Abdomen is soft. Tenderness: There is no abdominal tenderness. There is no guarding or rebound. Comments: No ascites   Musculoskeletal:         General: Normal range of motion. Cervical back: Normal range of motion and neck supple. Skin:     General: Skin is warm. Coloration: Skin is not pale. Findings: No erythema or rash. Comments: She is not diaphoretic   Neurological:      Mental Status: She is alert and oriented to person, place, and time. Deep Tendon Reflexes: Reflexes are normal and symmetric. Psychiatric:         Behavior: Behavior normal.         Thought Content: Thought content normal.         Judgment: Judgment normal.         IMPRESSION: Ms. Frances Barnes is a 35 y.o. female with      Diagnosis Orders   1. Diarrhea, unspecified type  Celiac Disease Panel    EGD    COLONOSCOPY W/ OR W/O BIOPSY   2. Gastroesophageal reflux disease, unspecified whether esophagitis present  EGD   Continue with Prilosec  Proceed with the above and will take it from there        Diet/life style/natural hx /complication of the dx were all explained in details   Past medical, past surgical, social history, psychiatric history, medications or allergies, all reviewed and  updated    Take Prilosec daily not PPR     Thank you for allowing me to participate in the care of Ms. Frances Barnes. For any further questions please do not hesitate to contact me. I have reviewed and agree with the MA/RN ROS. Note is dictated utilizing voice recognition software. Unfortunately this leads to occasional typographical errors.  Please contact our office if you have any questions.     Bc Perdomo MD  [facility]  O: #[phone]

## 2022-04-10 ENCOUNTER — HOSPITAL ENCOUNTER (EMERGENCY)
Age: 42
Discharge: HOME | End: 2022-04-10
Attending: EMERGENCY MEDICINE
Payer: COMMERCIAL

## 2022-04-10 VITALS
WEIGHT: 95.5 LBS | HEART RATE: 70 BPM | RESPIRATION RATE: 14 BRPM | SYSTOLIC BLOOD PRESSURE: 146 MMHG | TEMPERATURE: 98.6 F | DIASTOLIC BLOOD PRESSURE: 75 MMHG | HEIGHT: 62 IN | BODY MASS INDEX: 17.57 KG/M2 | OXYGEN SATURATION: 99 %

## 2022-04-10 DIAGNOSIS — R42 DIZZINESS: ICD-10-CM

## 2022-04-10 DIAGNOSIS — N93.9 VAGINAL BLEEDING: Primary | ICD-10-CM

## 2022-04-10 LAB
-: ABNORMAL
ABSOLUTE EOS #: 0.2 K/UL (ref 0–0.4)
ABSOLUTE LYMPH #: 1.2 K/UL (ref 1–4.8)
ABSOLUTE MONO #: 0.4 K/UL (ref 0.1–1.2)
BACTERIA: ABNORMAL
BASOPHILS # BLD: 1 % (ref 0–2)
BASOPHILS ABSOLUTE: 0.1 K/UL (ref 0–0.2)
BILIRUBIN URINE: NEGATIVE
COLOR: YELLOW
EOSINOPHILS RELATIVE PERCENT: 3 % (ref 1–4)
EPITHELIAL CELLS UA: ABNORMAL /HPF (ref 0–5)
GLUCOSE URINE: NEGATIVE
HCG QUALITATIVE: NEGATIVE
HCT VFR BLD CALC: 39.5 % (ref 36–46)
HEMOGLOBIN: 13.3 G/DL (ref 12–16)
KETONES, URINE: ABNORMAL
LEUKOCYTE ESTERASE, URINE: NEGATIVE
LYMPHOCYTES # BLD: 15 % (ref 24–44)
MCH RBC QN AUTO: 30.9 PG (ref 26–34)
MCHC RBC AUTO-ENTMCNC: 33.7 G/DL (ref 31–37)
MCV RBC AUTO: 91.7 FL (ref 80–100)
MONOCYTES # BLD: 5 % (ref 2–11)
NITRITE, URINE: NEGATIVE
OTHER OBSERVATIONS UA: ABNORMAL
PDW BLD-RTO: 13.3 % (ref 12.5–15.4)
PH UA: 6 (ref 5–8)
PLATELET # BLD: 131 K/UL (ref 140–450)
PMV BLD AUTO: 9.5 FL (ref 6–12)
PROTEIN UA: NEGATIVE
RBC # BLD: 4.3 M/UL (ref 4–5.2)
RBC UA: ABNORMAL /HPF (ref 0–2)
SEG NEUTROPHILS: 76 % (ref 36–66)
SEGMENTED NEUTROPHILS ABSOLUTE COUNT: 6 K/UL (ref 1.8–7.7)
SPECIFIC GRAVITY UA: 1.02 (ref 1–1.03)
TURBIDITY: ABNORMAL
URINE HGB: ABNORMAL
UROBILINOGEN, URINE: NORMAL
WBC # BLD: 7.9 K/UL (ref 3.5–11)
WBC UA: ABNORMAL /HPF (ref 0–5)

## 2022-04-10 PROCEDURE — 36415 COLL VENOUS BLD VENIPUNCTURE: CPT

## 2022-04-10 PROCEDURE — 84703 CHORIONIC GONADOTROPIN ASSAY: CPT

## 2022-04-10 PROCEDURE — 81001 URINALYSIS AUTO W/SCOPE: CPT

## 2022-04-10 PROCEDURE — 99284 EMERGENCY DEPT VISIT MOD MDM: CPT

## 2022-04-10 PROCEDURE — 85025 COMPLETE CBC W/AUTO DIFF WBC: CPT

## 2022-04-10 RX ORDER — ONDANSETRON 4 MG/1
4 TABLET, FILM COATED ORAL EVERY 8 HOURS PRN
Qty: 20 TABLET | Refills: 0 | Status: SHIPPED | OUTPATIENT
Start: 2022-04-10

## 2022-04-10 RX ORDER — OXYCODONE HYDROCHLORIDE AND ACETAMINOPHEN 5; 325 MG/1; MG/1
1 TABLET ORAL EVERY 6 HOURS PRN
Qty: 15 TABLET | Refills: 0 | Status: SHIPPED | OUTPATIENT
Start: 2022-04-10 | End: 2022-04-17

## 2022-04-10 ASSESSMENT — PAIN DESCRIPTION - FREQUENCY: FREQUENCY: CONTINUOUS

## 2022-04-10 ASSESSMENT — PAIN DESCRIPTION - PAIN TYPE: TYPE: ACUTE PAIN

## 2022-04-10 ASSESSMENT — PAIN DESCRIPTION - PROGRESSION: CLINICAL_PROGRESSION: NOT CHANGED

## 2022-04-10 ASSESSMENT — PAIN DESCRIPTION - LOCATION: LOCATION: FLANK

## 2022-04-10 ASSESSMENT — PAIN DESCRIPTION - ORIENTATION: ORIENTATION: RIGHT

## 2022-04-10 ASSESSMENT — PAIN DESCRIPTION - ONSET: ONSET: GRADUAL

## 2022-04-10 ASSESSMENT — PAIN - FUNCTIONAL ASSESSMENT: PAIN_FUNCTIONAL_ASSESSMENT: PREVENTS OR INTERFERES SOME ACTIVE ACTIVITIES AND ADLS

## 2022-04-10 ASSESSMENT — PAIN SCALES - GENERAL: PAINLEVEL_OUTOF10: 5

## 2022-04-10 ASSESSMENT — PAIN DESCRIPTION - DESCRIPTORS: DESCRIPTORS: ACHING

## 2022-04-10 NOTE — ED TRIAGE NOTES
Patient came to the ER with a chief complaint of heavy vaginal bleeding x 2 days. Patient also complains of feeling dizzy today. Patient has mild nausea but denies chest pain and breathing difficulties. Patient was triaged to room 3 and updated on the plan of care.

## 2022-04-10 NOTE — ED NOTES
The call service for Hugh Stacy CNP was contacted per Dr. Jud Berry request     Haseeb Lua RN  04/10/22 3721

## 2022-04-10 NOTE — ED PROVIDER NOTES
[id number] [facility] ED  [address] [facility] [address]  Phone: [phone]  Fax: Ariella Sanders 112      Pt Name: Manny Clinton  MRN: [id number]  [place] 1980  Date of evaluation: 4/10/2022    CHIEF COMPLAINT       Chief Complaint   Patient presents with    Vaginal Bleeding     Heavy vaginal bleeding x 2 days, passing clots, today she is feeling light headed and dizzy    Dizziness       HISTORY OF PRESENT ILLNESS    Manny Clinton is a 35 y.o. female who presents to the emergency department complaining of vaginal bleeding over the past couple of days. Passing clots. She feels lightheaded and dizzy. Patient states she has had periods off and on since December that have been intermittent. Bleeding every 4 days for approximately 4 days at a time. She has been seen by her gynecologist for this. Today she just felt lightheaded. Denies any other complaints. REVIEW OF SYSTEMS       Constitutional: No fevers or chills   HEENT: No sore throat, rhinorrhea, or earache   Eyes: No blurry vision or double vision no drainage   Cardiovascular: No chest pain or tachycardia   Respiratory: No wheezing or shortness of breath no cough   Gastrointestinal: No nausea, vomiting, diarrhea, constipation, or abdominal pain   : No hematuria or dysuria positive vaginal bleeding  Musculoskeletal: No swelling or pain   Skin: No rash   Neurological: No focal neurologic complaints, paresthesias, weakness, or headache     PAST MEDICAL HISTORY    has a past medical history of Dorsal back pain, DUB (dysfunctional uterine bleeding), Menorrhagia, Other specified fetal and placental problems affecting management of mother, antepartum, 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[address] Suspected placental problem not found, Thrombocytopenia ([address] Uterine leiomyoma, and Vitamin D deficiency. SURGICAL HISTORY      has a past surgical history that includes Tubal ligation (10/08/2012);   section, low transverse (2004);  section, low transverse (2008);  section, low transverse (10/2012); and Dilation and curettage of uterus (N/A, 2020). CURRENT MEDICATIONS       Previous Medications    BISACODYL (BISACODYL) 5 MG EC TABLET    Take 4 tablets in the morning    DICYCLOMINE (BENTYL) 10 MG CAPSULE    Take 1 capsule by mouth 4 times daily    EQ EYE ITCH RELIEF 0.025 % OPHTHALMIC SOLUTION    Place 1 drop into both eyes 2 times daily    FLUOCINOLONE ACETONIDE (SYNALAR) 0.01 % EXTERNAL SOLUTION    Use as directed    FLUTICASONE (FLONASE) 50 MCG/ACT NASAL SPRAY    2 sprays by Each Nostril route daily    IBUPROFEN (ADVIL;MOTRIN) 800 MG TABLET    Take 1 tablet by mouth every 8 hours as needed for Pain    LORATADINE (CLARITIN) 10 MG TABLET    Take 1 tablet by mouth daily    OMEPRAZOLE (PRILOSEC) 20 MG DELAYED RELEASE CAPSULE    Take 1 capsule by mouth daily    POLYETHYLENE GLYCOL (GLYCOLAX) 17 GM/SCOOP POWDER    Follow Instructions provided by physician's office    VITAMIN D (ERGOCALCIFEROL) 1.25 MG (34071 UT) CAPS CAPSULE    Take 1 capsule by mouth once a week       ALLERGIES     is allergic to seasonal.    FAMILY HISTORY     She indicated that her mother is alive. She indicated that her father is . family history includes High Blood Pressure in her father and mother. SOCIAL HISTORY      reports that she has never smoked. She has never used smokeless tobacco. She reports that she does not drink alcohol and does not use drugs.     PHYSICAL EXAM       ED Triage Vitals [04/10/22 1117]   BP Temp Temp Source Pulse Resp SpO2 Height Weight   (!) 149/80 98.6 °F (37 °C) Oral 86 14 99 % 5' 2\" (1.575 m) 95 lb 8 oz (43.3 kg)       Constitutional: Alert, oriented x3, nontoxic, answering questions appropriately, acting properly for age, in no acute distress   HEENT: Extraocular muscles intact,   Neck: Trachea midline   Cardiovascular: Regular rhythm and rate no murmurs   Respiratory: Clear to auscultation bilaterally no wheezes, rhonchi, rales, no respiratory distress no tachypnea no retractions no hypoxia  Gastrointestinal: Soft, nontender, nondistended, positive bowel sounds. No rebound, rigidity, or guarding. Musculoskeletal: No extremity pain or swelling   Neurologic: Moving all 4 extremities without difficulty there are no gross focal neurologic deficits   Skin: Warm and dry     DIFFERENTIAL DIAGNOSIS/ MDM:     Abdomen soft nonsurgical.  Will check labs.       DIAGNOSTIC RESULTS     EKG: All EKG's are interpreted by the Emergency Department Physician who either signs or Co-signs this chart in the absence of a cardiologist.        Not indicated unless otherwise documented above    LABS:  Results for orders placed or performed during the hospital encounter of 04/10/22   CBC with Auto Differential   Result Value Ref Range    WBC 7.9 3.5 - 11.0 k/uL    RBC 4.30 4.0 - 5.2 m/uL    Hemoglobin 13.3 12.0 - 16.0 g/dL    Hematocrit 39.5 36 - 46 %    MCV 91.7 80 - 100 fL    MCH 30.9 26 - 34 pg    MCHC 33.7 31 - 37 g/dL    RDW 13.3 12.5 - 15.4 %    Platelets 423 (L) 092 - 450 k/uL    MPV 9.5 6.0 - 12.0 fL    Seg Neutrophils 76 (H) 36 - 66 %    Lymphocytes 15 (L) 24 - 44 %    Monocytes 5 2 - 11 %    Eosinophils % 3 1 - 4 %    Basophils 1 0 - 2 %    Segs Absolute 6.00 1.8 - 7.7 k/uL    Absolute Lymph # 1.20 1.0 - 4.8 k/uL    Absolute Mono # 0.40 0.1 - 1.2 k/uL    Absolute Eos # 0.20 0.0 - 0.4 k/uL    Basophils Absolute 0.10 0.0 - 0.2 k/uL   HCG Qualitative, Serum   Result Value Ref Range    hCG Qual NEGATIVE NEGATIVE   Urinalysis with Reflex to Culture    Specimen: Urine, clean catch   Result Value Ref Range    Color, UA Yellow Yellow    Turbidity UA Cloudy (A) Clear    Glucose, Ur NEGATIVE NEGATIVE    Bilirubin Urine NEGATIVE NEGATIVE    Ketones, Urine TRACE (A) NEGATIVE    Specific Gravity, UA 1.020 1.005 - 1.030    Urine Hgb LARGE (A) NEGATIVE    pH, UA 6.0 5.0 - 8.0    Protein, UA NEGATIVE NEGATIVE    Urobilinogen, Urine Normal Normal    Nitrite, Urine NEGATIVE NEGATIVE    Leukocyte Esterase, Urine NEGATIVE NEGATIVE   Microscopic Urinalysis   Result Value Ref Range    -          WBC, UA 0 TO 2 0 - 5 /HPF    RBC, UA TOO NUMEROUS TO COUNT 0 - 2 /HPF    Epithelial Cells UA 2 TO 5 0 - 5 /HPF    Bacteria, UA None None    Other Observations UA (A) NOT REQ. Utilizing a urinalysis as the only screening method to exclude a potential uropathogen can be unreliable in many patient populations. Rapid screening tests are less sensitive than culture and if UTI is a clinical possibility, culture should be considered despite a negative urinalysis. Not indicated unless otherwise documented above    RADIOLOGY:   I reviewed the radiologist interpretations:    No orders to display       Not indicated unless otherwise documented above    EMERGENCY DEPARTMENT COURSE:     The patient was given the following medications:  Orders Placed This Encounter   Medications    ondansetron (ZOFRAN) 4 MG tablet     Sig: Take 1 tablet by mouth every 8 hours as needed for Nausea     Dispense:  20 tablet     Refill:  0    oxyCODONE-acetaminophen (PERCOCET) 5-325 MG per tablet     Sig: Take 1 tablet by mouth every 6 hours as needed for Pain for up to 7 days. OARRS Reviewed: ICD-10-CM Diagnosis Code R52 : Pain     Dispense:  15 tablet     Refill:  0        Vitals:   -------------------------  BP (!) 149/80   Pulse 86   Temp 98.6 °F (37 °C) (Oral)   Resp 14   Ht 5' 2\" (1.575 m)   Wt 43.3 kg (95 lb 8 oz)   SpO2 99%   BMI 17.47 kg/m²       12:30 PM discussed with Dr. Nyasia Fortune covering for Dr. Nyasia Fortune regarding lab results and patient's history. Does not recommend high-dose estrogen at this time but recommends following up with Dr. Nyasia Fortune specifically tomorrow calling for an appointment. She is hemodynamically stable. No hypotension or tachycardia. Hemoglobin 13.3. Prescription for pain medicine and Zofran.     The patient and her visitor understands that at this time there is no evidence for a more malignant underlying process, but also understands that early in the process of an illness or injury, an emergency department workup can be falsely reassuring. Routine discharge counseling was given, and it is understood that worsening, changing or persistent symptoms should prompt an immediate call or follow up with their primary physician or return to the emergency department. The importance of appropriate follow up was also discussed. I have reviewed the disposition diagnosis. I have answered the questions and given discharge instructions. There was voiced understanding of these instructions and no further questions or complaints. CRITICAL CARE:    None    CONSULTS:    None    PROCEDURES:    None      OARRS Report if indicated    Periodic Controlled Substance Monitoring: No signs of potential drug abuse or diversion identified. (Gera Abebe DO)        FINAL IMPRESSION      1. Vaginal bleeding    2. Dizziness          DISPOSITION/PLAN   DISPOSITION Decision To Discharge 04/10/2022 12:34:14 PM        CONDITION ON DISPOSITION: STABLE       PATIENT REFERRED TO:  Dr. Gal Andre    Call in 1 day        DISCHARGE MEDICATIONS:  New Prescriptions    ONDANSETRON (ZOFRAN) 4 MG TABLET    Take 1 tablet by mouth every 8 hours as needed for Nausea    OXYCODONE-ACETAMINOPHEN (PERCOCET) 5-325 MG PER TABLET    Take 1 tablet by mouth every 6 hours as needed for Pain for up to 7 days.  OARRS Reviewed: ICD-10-CM Diagnosis Code R52 : Pain       (Please note that portions of this note were completed with a voice recognition program.  Efforts were made to edit the dictations but occasionally words are mis-transcribed.)    Rachael Paris DO   Attending Emergency Physician      Rachael Paris DO  04/10/22 1551

## 2022-04-11 ENCOUNTER — TELEPHONE (OUTPATIENT)
Dept: OBGYN CLINIC | Age: 42
End: 2022-04-11

## 2022-04-11 RX ORDER — TRANEXAMIC ACID 650 1/1
1300 TABLET ORAL 3 TIMES DAILY
Qty: 30 TABLET | Refills: 0 | Status: SHIPPED | OUTPATIENT
Start: 2022-04-11 | End: 2022-04-16

## 2022-04-11 NOTE — TELEPHONE ENCOUNTER
Spoke with pt- was at ED yesterday. Hgb stable. Lysjosea sent, but she needs surgical consult w/MD for possible hysteroscopy and endometrial sampling. She is probably going to  her rx. Could you please call to schedule her this afternoon?

## 2022-04-11 NOTE — TELEPHONE ENCOUNTER
Pt called requesting to speak to you personally did not give much information besides she has been bleeding heavy and having other concerns please advise

## 2022-05-04 ENCOUNTER — INITIAL CONSULT (OUTPATIENT)
Dept: OBGYN CLINIC | Age: 42
End: 2022-05-04
Payer: COMMERCIAL

## 2022-05-04 ENCOUNTER — HOSPITAL ENCOUNTER (OUTPATIENT)
Age: 42
Setting detail: SPECIMEN
Discharge: HOME | End: 2022-05-04

## 2022-05-04 VITALS
HEART RATE: 78 BPM | SYSTOLIC BLOOD PRESSURE: 116 MMHG | HEIGHT: 62 IN | OXYGEN SATURATION: 99 % | WEIGHT: 94 LBS | DIASTOLIC BLOOD PRESSURE: 64 MMHG | BODY MASS INDEX: 17.3 KG/M2

## 2022-05-04 DIAGNOSIS — Z01.818 PREOPERATIVE EXAM FOR GYNECOLOGIC SURGERY: ICD-10-CM

## 2022-05-04 DIAGNOSIS — N92.1 IRREGULAR INTERMENSTRUAL BLEEDING: Primary | ICD-10-CM

## 2022-05-04 PROCEDURE — 58100 BIOPSY OF UTERUS LINING: CPT | Performed by: STUDENT IN AN ORGANIZED HEALTH CARE EDUCATION/TRAINING PROGRAM

## 2022-05-04 NOTE — PROGRESS NOTES
[address] [facility]  [address]      Procedure Note    Jackie Okeefe  5/4/2022                       Primary Care Physician: RADHA Whiteside - MARTITA      Subjective:   Jackie Okeefe 35 y.o. female [id number] is here for previously scheduled EMB due to history of AUB following Ablation. Patient's last menstrual period was 11/12/2021 (approximate). . She has no complaints today. Vitals:   Blood pressure 116/64, pulse 78, height 5' 2\" (1.575 m), weight 94 lb (42.6 kg), last menstrual period 11/12/2021, SpO2 99 %, not currently breastfeeding. Procedure: EMB    Indications: AUB following Ablation    Procedure Details: The patient was counseled on the procedure. Risks, benefits and alternatives were reviewed. The patient is aware that this is diagnostic and not curative and a second procedure may be needed. A consent was reviewed and obtained. Endometrial Biopsy  A sterile speculum was placed into the vagina and the cervix was identified. The cervix was cleansed with Betadine. It was stabilized with an allis clamp and the aspirator was then gently passed into the endometrial cavity. Tissue was obtained and sent to pathology. The patient tolerated the procedure well. Post procedure restrictions were reviewed and given to the patient. All counts and instruments were correct at the end of the procedure.      Lab:  Pregnancy Test:  Lab Results   Component Value Date    PREGTESTUR positive 04/25/2012    HCG NEGATIVE 02/07/2020       Assessment & Plan:  Jackie Isaac y.o. female [id number]  Patient Active Problem List    Diagnosis Date Noted    2/7/20 Hysteroscopy, D&C, Novasure ablation  02/07/2020    Arthralgia of both hands 06/20/2017    Central positional vertigo 06/15/2017    Unilateral vestibular weakness 06/15/2017    Thrombocytopenia ([facility] [address] 10/22/2013    Underweight 10/22/2013    Dorsal back pain 06/23/2013       The patient was counseled on follow up and home care with restrictions noted.    Return in about 4 weeks (around 6/1/2022) for Results Follow up.;     Tasneem Schwab, [address] [facility]   5/4/2022, 9:58 AM

## 2022-05-06 LAB — SURGICAL PATHOLOGY REPORT: NORMAL

## 2022-05-12 ENCOUNTER — HOSPITAL ENCOUNTER (OUTPATIENT)
Age: 42
Discharge: HOME | End: 2022-05-12
Payer: COMMERCIAL

## 2022-05-12 ENCOUNTER — ANESTHESIA EVENT (OUTPATIENT)
Dept: OPERATING ROOM | Age: 42
End: 2022-05-12
Payer: COMMERCIAL

## 2022-05-12 DIAGNOSIS — R19.7 DIARRHEA, UNSPECIFIED TYPE: ICD-10-CM

## 2022-05-12 PROCEDURE — 36415 COLL VENOUS BLD VENIPUNCTURE: CPT

## 2022-05-12 PROCEDURE — 83516 IMMUNOASSAY NONANTIBODY: CPT

## 2022-05-12 PROCEDURE — 82784 ASSAY IGA/IGD/IGG/IGM EACH: CPT

## 2022-05-12 RX ORDER — SODIUM CHLORIDE 0.9 % (FLUSH) 0.9 %
5-40 SYRINGE (ML) INJECTION EVERY 12 HOURS SCHEDULED
Status: CANCELLED | OUTPATIENT
Start: 2022-05-12

## 2022-05-12 RX ORDER — SODIUM CHLORIDE 9 MG/ML
INJECTION, SOLUTION INTRAVENOUS PRN
Status: CANCELLED | OUTPATIENT
Start: 2022-05-12

## 2022-05-12 RX ORDER — SODIUM CHLORIDE 0.9 % (FLUSH) 0.9 %
5-40 SYRINGE (ML) INJECTION PRN
Status: CANCELLED | OUTPATIENT
Start: 2022-05-12

## 2022-05-12 RX ORDER — SODIUM CHLORIDE 9 MG/ML
INJECTION, SOLUTION INTRAVENOUS CONTINUOUS
Status: CANCELLED | OUTPATIENT
Start: 2022-05-12

## 2022-05-12 RX ORDER — SODIUM CHLORIDE, SODIUM LACTATE, POTASSIUM CHLORIDE, CALCIUM CHLORIDE 600; 310; 30; 20 MG/100ML; MG/100ML; MG/100ML; MG/100ML
INJECTION, SOLUTION INTRAVENOUS CONTINUOUS
Status: CANCELLED | OUTPATIENT
Start: 2022-05-12

## 2022-05-13 RX ORDER — ERGOCALCIFEROL 1.25 MG/1
50000 CAPSULE ORAL WEEKLY
Qty: 4 CAPSULE | Refills: 2 | Status: SHIPPED | OUTPATIENT
Start: 2022-05-13 | End: 2022-08-16

## 2022-05-13 NOTE — TELEPHONE ENCOUNTER
Last Visit Date: 3/25/2022   Next Visit Date: Visit date not found Portable finger xray performed per RT at this time.

## 2022-05-16 ENCOUNTER — ANESTHESIA (OUTPATIENT)
Dept: OPERATING ROOM | Age: 42
End: 2022-05-16
Payer: COMMERCIAL

## 2022-05-16 ENCOUNTER — HOSPITAL ENCOUNTER (OUTPATIENT)
Age: 42
Setting detail: OUTPATIENT SURGERY
Discharge: HOSPICE | End: 2022-05-16
Attending: INTERNAL MEDICINE | Admitting: INTERNAL MEDICINE
Payer: COMMERCIAL

## 2022-05-16 VITALS
BODY MASS INDEX: 17.12 KG/M2 | TEMPERATURE: 96.8 F | HEIGHT: 62 IN | WEIGHT: 93.03 LBS | OXYGEN SATURATION: 100 % | SYSTOLIC BLOOD PRESSURE: 124 MMHG | DIASTOLIC BLOOD PRESSURE: 92 MMHG | RESPIRATION RATE: 17 BRPM | HEART RATE: 84 BPM

## 2022-05-16 LAB
GLIADIN DEAMINIDATED PEPTIDE AB IGA: 1 U/ML
GLIADIN DEAMINIDATED PEPTIDE AB IGG: <0.4 U/ML
HCG, PREGNANCY URINE (POC): NEGATIVE
IGA: 127 MG/DL (ref 70–400)
TISSUE TRANSGLUTAMINASE IGA: 0.2 U/ML

## 2022-05-16 PROCEDURE — 81025 URINE PREGNANCY TEST: CPT

## 2022-05-16 PROCEDURE — 7100000001 HC PACU RECOVERY - ADDTL 15 MIN: Performed by: INTERNAL MEDICINE

## 2022-05-16 PROCEDURE — 7100000010 HC PHASE II RECOVERY - FIRST 15 MIN: Performed by: INTERNAL MEDICINE

## 2022-05-16 PROCEDURE — 88305 TISSUE EXAM BY PATHOLOGIST: CPT

## 2022-05-16 PROCEDURE — 2580000003 HC RX 258: Performed by: ANESTHESIOLOGY

## 2022-05-16 PROCEDURE — 3609012400 HC EGD TRANSORAL BIOPSY SINGLE/MULTIPLE: Performed by: INTERNAL MEDICINE

## 2022-05-16 PROCEDURE — 2500000003 HC RX 250 WO HCPCS: Performed by: ANESTHESIOLOGY

## 2022-05-16 PROCEDURE — 6360000002 HC RX W HCPCS: Performed by: ANESTHESIOLOGY

## 2022-05-16 PROCEDURE — 7100000011 HC PHASE II RECOVERY - ADDTL 15 MIN: Performed by: INTERNAL MEDICINE

## 2022-05-16 PROCEDURE — 2709999900 HC NON-CHARGEABLE SUPPLY: Performed by: INTERNAL MEDICINE

## 2022-05-16 PROCEDURE — 43239 EGD BIOPSY SINGLE/MULTIPLE: CPT | Performed by: INTERNAL MEDICINE

## 2022-05-16 PROCEDURE — 3609010300 HC COLONOSCOPY W/BIOPSY SINGLE/MULTIPLE: Performed by: INTERNAL MEDICINE

## 2022-05-16 PROCEDURE — 3700000001 HC ADD 15 MINUTES (ANESTHESIA): Performed by: INTERNAL MEDICINE

## 2022-05-16 PROCEDURE — 7100000000 HC PACU RECOVERY - FIRST 15 MIN: Performed by: INTERNAL MEDICINE

## 2022-05-16 PROCEDURE — 3700000000 HC ANESTHESIA ATTENDED CARE: Performed by: INTERNAL MEDICINE

## 2022-05-16 PROCEDURE — 45380 COLONOSCOPY AND BIOPSY: CPT | Performed by: INTERNAL MEDICINE

## 2022-05-16 RX ORDER — SODIUM CHLORIDE 0.9 % (FLUSH) 0.9 %
5-40 SYRINGE (ML) INJECTION EVERY 12 HOURS SCHEDULED
Status: DISCONTINUED | OUTPATIENT
Start: 2022-05-16 | End: 2022-05-16 | Stop reason: HOSPADM

## 2022-05-16 RX ORDER — PROPOFOL 10 MG/ML
INJECTION, EMULSION INTRAVENOUS PRN
Status: DISCONTINUED | OUTPATIENT
Start: 2022-05-16 | End: 2022-05-16 | Stop reason: SDUPTHER

## 2022-05-16 RX ORDER — MORPHINE SULFATE 1 MG/ML
1 INJECTION, SOLUTION EPIDURAL; INTRATHECAL; INTRAVENOUS EVERY 5 MIN PRN
Status: DISCONTINUED | OUTPATIENT
Start: 2022-05-16 | End: 2022-05-16 | Stop reason: HOSPADM

## 2022-05-16 RX ORDER — DIPHENHYDRAMINE HYDROCHLORIDE 50 MG/ML
12.5 INJECTION INTRAMUSCULAR; INTRAVENOUS
Status: DISCONTINUED | OUTPATIENT
Start: 2022-05-16 | End: 2022-05-16 | Stop reason: HOSPADM

## 2022-05-16 RX ORDER — MEPERIDINE HYDROCHLORIDE 50 MG/ML
12.5 INJECTION INTRAMUSCULAR; INTRAVENOUS; SUBCUTANEOUS ONCE
Status: DISCONTINUED | OUTPATIENT
Start: 2022-05-16 | End: 2022-05-16 | Stop reason: HOSPADM

## 2022-05-16 RX ORDER — SODIUM CHLORIDE 0.9 % (FLUSH) 0.9 %
5-40 SYRINGE (ML) INJECTION PRN
Status: DISCONTINUED | OUTPATIENT
Start: 2022-05-16 | End: 2022-05-16 | Stop reason: HOSPADM

## 2022-05-16 RX ORDER — SODIUM CHLORIDE, SODIUM LACTATE, POTASSIUM CHLORIDE, CALCIUM CHLORIDE 600; 310; 30; 20 MG/100ML; MG/100ML; MG/100ML; MG/100ML
INJECTION, SOLUTION INTRAVENOUS CONTINUOUS PRN
Status: DISCONTINUED | OUTPATIENT
Start: 2022-05-16 | End: 2022-05-16 | Stop reason: SDUPTHER

## 2022-05-16 RX ORDER — SODIUM CHLORIDE 9 MG/ML
25 INJECTION, SOLUTION INTRAVENOUS PRN
Status: DISCONTINUED | OUTPATIENT
Start: 2022-05-16 | End: 2022-05-16 | Stop reason: HOSPADM

## 2022-05-16 RX ORDER — LIDOCAINE HYDROCHLORIDE 10 MG/ML
INJECTION, SOLUTION EPIDURAL; INFILTRATION; INTRACAUDAL; PERINEURAL PRN
Status: DISCONTINUED | OUTPATIENT
Start: 2022-05-16 | End: 2022-05-16 | Stop reason: SDUPTHER

## 2022-05-16 RX ORDER — ONDANSETRON 2 MG/ML
4 INJECTION INTRAMUSCULAR; INTRAVENOUS
Status: DISCONTINUED | OUTPATIENT
Start: 2022-05-16 | End: 2022-05-16 | Stop reason: HOSPADM

## 2022-05-16 RX ADMIN — LIDOCAINE HYDROCHLORIDE 50 MG: 10 INJECTION, SOLUTION EPIDURAL; INFILTRATION; INTRACAUDAL; PERINEURAL at 08:48

## 2022-05-16 RX ADMIN — PROPOFOL 30 MG: 10 INJECTION, EMULSION INTRAVENOUS at 09:05

## 2022-05-16 RX ADMIN — PROPOFOL 50 MG: 10 INJECTION, EMULSION INTRAVENOUS at 08:50

## 2022-05-16 RX ADMIN — PROPOFOL 30 MG: 10 INJECTION, EMULSION INTRAVENOUS at 09:02

## 2022-05-16 RX ADMIN — PROPOFOL 150 MG: 10 INJECTION, EMULSION INTRAVENOUS at 08:48

## 2022-05-16 RX ADMIN — PROPOFOL 30 MG: 10 INJECTION, EMULSION INTRAVENOUS at 08:59

## 2022-05-16 RX ADMIN — SODIUM CHLORIDE, POTASSIUM CHLORIDE, SODIUM LACTATE AND CALCIUM CHLORIDE: 600; 310; 30; 20 INJECTION, SOLUTION INTRAVENOUS at 08:43

## 2022-05-16 RX ADMIN — PROPOFOL 30 MG: 10 INJECTION, EMULSION INTRAVENOUS at 09:08

## 2022-05-16 RX ADMIN — PROPOFOL 30 MG: 10 INJECTION, EMULSION INTRAVENOUS at 08:56

## 2022-05-16 RX ADMIN — PROPOFOL 40 MG: 10 INJECTION, EMULSION INTRAVENOUS at 08:53

## 2022-05-16 NOTE — OP NOTE
PROCEDURE NOTE    DATE OF PROCEDURE: 5/16/2022     SURGEON: Kusum Mckenna MD  Facility: [facility]   ASSISTANT: None  Anesthesia: mac   PREOPERATIVE DIAGNOSIS:   GERD  Diarrhea    Diagnosis:  Mild esophagitis biopsies were taken    Gastritis biopsies were taken    Random small bowel biopsies were taken because of the diarrhea        POSTOPERATIVE DIAGNOSIS: As described below    OPERATION: Upper GI endoscopy with Biopsy    ANESTHESIA: Moderate Sedation     ESTIMATED BLOOD LOSS: Less than 50 ml    COMPLICATIONS: None. SPECIMENS:  Was Obtained:     Mild esophagitis biopsies were taken    Gastritis biopsies were taken    Random small bowel biopsies were taken because of the diarrhea    HISTORY: The patient is a 38y.o. year old female with history of above preop diagnosis. I recommended esophagogastroduodenoscopy with possible biopsy and I explained the risk, benefits, expected outcome, and alternatives to the procedure. Risks included but are not limited to bleeding, infection, respiratory distress, hypotension, and perforation of the esophagus, stomach, or duodenum. Patient understands and is in agreement. The patient was counseled at length about the risks of alfonzo Covid-19 during their perioperative period and any recovery window from their procedure. The patient was made aware that alfonzo Covid-19  may worsen their prognosis for recovering from their procedure  and lend to a higher morbidity and/or mortality risk. All material risks, benefits, and reasonable alternatives including postponing the procedure were discussed. The patient does wish to proceed with the procedure at this time. PROCEDURE: The patient was given IV conscious sedation. The patient's SPO2 remained above 90% throughout the procedure. The gastroscope was inserted orally and advanced under direct vision through the esophagus, through the stomach, through the pylorus, and into the descending duodenum. Post sedation note : The patient's SPO2 remained above 90% throughout the procedure. the vital signs remained stable , and no immediate complication form the procedure noted, patient will be ready for d/c when criteria is met . Findings:    Retropharyngeal area was grossly normal appearing    Esophagus: abnormal: Mild esophagitis biopsies were taken    Stomach:    Gastritis biopsies were taken        Duodenum:     Random small bowel biopsies were taken because of the diarrhea    The scope was removed and the patient tolerated the procedure well. Recommendations/Plan:   1. F/U Biopsies  2. F/U In Office in 3-4 weeks  3.  Discussed with the family    Electronically signed by Sobia Stevenson MD  on 5/16/2022 at 8:55 AM

## 2022-05-16 NOTE — H&P
Procedure History and Physical    Pre-Procedural Diagnosis:        Diagnosis Orders   1. Diarrhea, unspecified type  Celiac Disease Panel     EGD     COLONOSCOPY W/ OR W/O BIOPSY   2. Gastroesophageal reflux disease, unspecified whether esophagitis present  EGD         Indications:  same    Procedure Planned: endoscopy and colonoscopy     History Obtained From:  patient    HISTORY OF PRESENT ILLNESS:       The patient is a 38 y.o. female who presents for the above procedure. Past Medical History:    Past Medical History:   Diagnosis Date    Dorsal back pain 2013    DUB (dysfunctional uterine bleeding) 2013    Menorrhagia 10/20/2014    Other specified fetal and placental problems affecting management of mother, antepartum 2012    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 [address]     Suspected placental problem not found 2012    Thrombocytopenia ([facility]     Uterine leiomyoma 2018    Vitamin D deficiency        Past Surgical History:    Past Surgical History:   Procedure Laterality Date     SECTION, LOW TRANSVERSE  2004     SECTION, LOW TRANSVERSE  2008     SECTION, LOW TRANSVERSE  10/2012    DILATION AND CURETTAGE OF UTERUS N/A 2020    DILATATION AND CURETTAGE HYSTEROSCOPY, NOVASURE performed by Yaron Arora MD at [place] [address]  10/08/2012       Medications:  No current facility-administered medications for this encounter. Allergies:    Allergies   Allergen Reactions    Seasonal                  Social   Social History     Tobacco Use    Smoking status: Never Smoker    Smokeless tobacco: Never Used   Substance Use Topics    Alcohol use: No        PSYCH HISTORY:  Depression No  Anxiety No  Suicide No       Family History   Problem Relation Age of Onset    High Blood Pressure Mother     High Blood Pressure Father       No family history of colon cancer, Crohn's disease, or ulcerative colitis    Problems with Sedation/Anesthesia in the past? no    REVIEW OF SYSTEMS:  12 point review of systems negative other than mentioned above. PHYSICAL EXAM:    Vitals:  /70   Pulse 67   Temp 98 °F (36.7 °C)   Resp 17   Ht 5' 2\" (1.575 m)   Wt 93 lb 0.4 oz (42.2 kg)   SpO2 100%   BMI 17.01 kg/m²     Focused Exam related to procedure:    General appearance: NAD, conversant   Eyes: anicteric sclerae, moist conjunctivae; no lid-lag; PERRLA   Lungs: CTA, with normal respiratory effort and no intercostal retractions   CV: RRR, no MRGs   Abdomen: Soft, non-tender; no masses or HSM   Skin: Normal temperature, turgor and texture; no rash, ulcers or subcutaneous nodules     DATA:  CBC:   Lab Results   Component Value Date    WBC 7.9 04/10/2022    HGB 13.3 04/10/2022    HCT 39.5 04/10/2022    MCV 91.7 04/10/2022     (L) 04/10/2022     BUN/Cr:   Lab Results   Component Value Date    BUN 5 (L) 03/30/2022   ,   Lab Results   Component Value Date    CREATININE 0.52 03/30/2022     Potassium:   Lab Results   Component Value Date    K 4.9 03/30/2022     PT/INR:   Lab Results   Component Value Date    INR 1.0 06/11/2019    INR 1.1 04/09/2018    PROTIME 12.0 06/11/2019    PROTIME 12.2 04/09/2018       ASSESSMENT AND PLAN:       1. Patient is a 38 y.o. female with above specified procedure planned. Expected Sedation/Anesthesia Type: General and MAC    2. ASA ([address] Anesthesiology) Anesthesia Status: Class 1 - A normal healthy patient    3. Mallampati: I (soft palate, uvula, fauces, tonsillar pillars visible)  4. Procedure options, risks and benefits reviewed with Patient. Patient expresses understanding.     5.  Consent has been signed:  Yes    Chloe Bains MD

## 2022-05-16 NOTE — ANESTHESIA PRE PROCEDURE
Department of Anesthesiology  Preprocedure Note       Name:  Imelda Bee   Age:  38 y.o.  :  1980                                          MRN:  [id number]         Date:  2022      Surgeon: Nora Lao):  Lonell Simmonds, MD    Procedure: Procedure(s):  EGD BIOPSY  COLONOSCOPY DIAGNOSTIC    Medications prior to admission:   Prior to Admission medications    Medication Sig Start Date End Date Taking? Authorizing Provider   vitamin D (ERGOCALCIFEROL) 1.25 MG (08247 UT) CAPS capsule Take 1 capsule by mouth once a week 22   RADHA Sweet CNP   tranexamic acid (LYSTEDA) 650 MG TABS tablet Take 2 tablets by mouth 3 times daily for 5 days 22  RADHA Ko CNP   ondansetron (ZOFRAN) 4 MG tablet Take 1 tablet by mouth every 8 hours as needed for Nausea 4/10/22   Gera Abebe DO   fluticasone (FLONASE) 50 MCG/ACT nasal spray 2 sprays by Each Nostril route daily 21   RADHA Sweet - MARTITA   loratadine (CLARITIN) 10 MG tablet Take 1 tablet by mouth daily 21   RADHA Sweet CNP   vitamin D (ERGOCALCIFEROL) 1.25 MG (39863 UT) CAPS capsule Take 1 capsule by mouth once a week 21   Elisha Hannon MD   ibuprofen (ADVIL;MOTRIN) 800 MG tablet Take 1 tablet by mouth every 8 hours as needed for Pain 20   Yael Katz DO       Current medications:    No current facility-administered medications for this encounter. Allergies:     Allergies   Allergen Reactions    Seasonal        Problem List:    Patient Active Problem List   Diagnosis Code    Dorsal back pain M54.9    Thrombocytopenia (HCC) D69.6    Underweight R63.6    Arthralgia of both hands M25.541, M25.542    Central positional vertigo H81.4    Unilateral vestibular weakness H81.8X9    20 Hysteroscopy, D&C, Novasure ablation  Z98.890       Past Medical History:        Diagnosis Date    Dorsal back pain 2013    DUB (dysfunctional uterine bleeding) 2013    Menorrhagia 10/20/2014    Other specified fetal and placental problems affecting management of mother, antepartum 2012    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[facility] [address]     Suspected placental problem not found 2012    Thrombocytopenia ([facility] [address]     Uterine leiomyoma 2018    Vitamin D deficiency        Past Surgical History:        Procedure Laterality Date     SECTION, LOW TRANSVERSE  2004     SECTION, LOW TRANSVERSE  2008     SECTION, LOW TRANSVERSE  10/2012    DILATION AND CURETTAGE OF UTERUS N/A 2020    DILATATION AND CURETTAGE HYSTEROSCOPY, NOVASURE performed by Ricky Rudd MD at [address] [place] [address]  10/08/2012       Social History:    Social History     Tobacco Use    Smoking status: Never Smoker    Smokeless tobacco: Never Used   Substance Use Topics    Alcohol use: No                                Counseling given: Not Answered      Vital Signs (Current):   Vitals:    22 0800 22 0817   BP:  121/70   Pulse:  67   Resp:  17   Temp:  98 °F (36.7 °C)   SpO2:  100%   Weight: 93 lb 0.4 oz (42.2 kg)    Height: 5' 2\" (1.575 m)                                               BP Readings from Last 3 Encounters:   22 121/70   22 116/64   04/10/22 (!) 146/75       NPO Status: Time of last liquid consumption: 0                        Time of last solid consumption:                         Date of last liquid consumption: 05/15/22                        Date of last solid food consumption: 05/15/22    BMI:   Wt Readings from Last 3 Encounters:   22 93 lb 0.4 oz (42.2 kg)   22 94 lb (42.6 kg)   04/10/22 95 lb 8 oz (43.3 kg)     Body mass index is 17.01 kg/m².     CBC:   Lab Results   Component Value Date    WBC 7.9 04/10/2022    RBC 4.30 04/10/2022    RBC 3.61 2012    HGB 13.3 04/10/2022    HCT 39.5 04/10/2022    MCV 91.7 04/10/2022    RDW 13.3 04/10/2022     04/10/2022     2012       CMP:   Lab Results discussed with CRNA.                   Duane Mtz MD   5/16/2022

## 2022-05-16 NOTE — OP NOTE
PROCEDURE NOTE    DATE OF PROCEDURE: 5/16/2022    SURGEON: Reese Helm MD  Facility : [facility]   ASSISTANT: None  Anesthesia: MAC  PREOPERATIVE DIAGNOSIS:   Diarrhea    POSTOPERATIVE DIAGNOSIS: as described below    OPERATION: Total colonoscopy     ANESTHESIA: Moderate Sedation    ESTIMATED BLOOD LOSS: less than 50     COMPLICATIONS: None. SPECIMENS:  Was Obtained:     Random colon biopsies were taken because of the diarrhea    HISTORY: The patient is a 38y.o. year old female with history of above preop diagnosis. I recommended colonoscopy with possible biopsy or polypectomy and I explained the risk, benefits, expected outcome, and alternatives to the procedure. Risks included but are not limited to bleeding, infection, respiratory distress, hypotension, and perforation of the colon and possibility of missing a lesion. The patient understands and is in agreement. The patient was counseled at length about the risks of alfonzo Covid-19 during their perioperative period and any recovery window from their procedure. The patient was made aware that alfonzo Covid-19  may worsen their prognosis for recovering from their procedure  and lend to a higher morbidity and/or mortality risk. All material risks, benefits, and reasonable alternatives including postponing the procedure were discussed. The patient does wish to proceed with the procedure at this time. PROCEDURE: The patient was given IV conscious sedation. The patient's SPO2 remained above 90% throughout the procedure. The colonoscope was inserted per rectum and advanced under direct vision to the cecum without difficulty. Post sedation note : The patient's SPO2 remained above 90% throughout the procedure. the vital signs remained stable , and no immediate complication form the procedure noted, patient will be ready for d/c when criteria is met . The prep was good.       Findings:  Terminal ileum: normal the last 5 cm    Cecum/Ascending colon: normal    Transverse colon: normal    Descending/Sigmoid colon: normal    Rectum/Anus: examined in normal and retroflexed positions and was normal      Random colon biopsies were taken because of the diarrhea    Withdrawal Time was (minutes): 10    The colon was decompressed and the scope was removed. The patient tolerated the procedure well. Recommendations/Plan:   1. Lifestyle and dietary modifications as discussed  2. F/U Biopsies  3. F/U In OfficeYes  4. Discussed with the family  5.  Repeat colonoscopy [id number]    Electronically signed by Lonell Simmonds, MD  on 5/16/2022 at 9:11 AM

## 2022-05-16 NOTE — ANESTHESIA POSTPROCEDURE EVALUATION
Department of Anesthesiology  Postprocedure Note    Patient: Evelin Fox  MRN: [id number]  [place]: 1980  Date of evaluation: 5/16/2022  Time:  9:15 AM     Procedure Summary     Date: 05/16/22 Room / Location: [address] [place] [address]    Anesthesia Start: 8481 Anesthesia Stop: 0915    Procedures:       EGD BIOPSY (N/A )      COLONOSCOPY WITH BIOPSY (N/A ) Diagnosis: (GERD DIARRHEA)    Surgeons: Hugo Chavez MD Responsible Provider: Lilia Morataya MD    Anesthesia Type: MAC ASA Status: 1          Anesthesia Type: No value filed. Mary Phase I:      Mary Phase II:      Last vitals: Reviewed and per EMR flowsheets.        Anesthesia Post Evaluation    Patient location during evaluation: PACU  Patient participation: complete - patient participated  Level of consciousness: awake and alert  Airway patency: patent  Nausea & Vomiting: no nausea and no vomiting  Complications: no  Cardiovascular status: hemodynamically stable  Respiratory status: nasal cannula and spontaneous ventilation  Hydration status: euvolemic  Multimodal analgesia pain management approach

## 2022-05-17 LAB — SURGICAL PATHOLOGY REPORT: NORMAL

## 2022-07-07 DIAGNOSIS — K21.9 GASTROESOPHAGEAL REFLUX DISEASE, UNSPECIFIED WHETHER ESOPHAGITIS PRESENT: ICD-10-CM

## 2022-07-07 DIAGNOSIS — R19.7 DIARRHEA, UNSPECIFIED TYPE: ICD-10-CM

## 2022-08-16 RX ORDER — ERGOCALCIFEROL 1.25 MG/1
50000 CAPSULE ORAL WEEKLY
Qty: 12 CAPSULE | Refills: 1 | Status: SHIPPED | OUTPATIENT
Start: 2022-08-16

## 2022-09-15 NOTE — PROGRESS NOTES
GI CLINIC FOLLOW UP    INTERVAL HISTORY:   No referring provider defined for this encounter. Chief Complaint   Patient presents with    Diarrhea     Patient is f/u on diarrhea, EGD, colonoscopy and GERD. She denies diarrhea at this time. HISTORY OF PRESENT ILLNESS: Arrie Cogan is a 38 y.o. female , referred for evaluation of  GERD, diarrhea     Here for f/u    Last  visit  Egd/colon   Sympotms resolved   PPI prn   Labs : cbc /LFTs  3/2022  No N/v   no abd pain   no melena/hematemsis/hematochezia  No dysphagia/odynophagia   no wt loss   no diarrhea /contipation      Findings:       Retropharyngeal area was grossly normal appearing       Esophagus: abnormal: Mild esophagitis biopsies were taken       Stomach:       Gastritis biopsies were taken         Duodenum:       Random small bowel biopsies were taken because of the diarrhea       The scope was removed and the patient tolerated the procedure well. Recommendations/Plan:   1. F/U Biopsies   2. F/U In Office in 3-4 weeks   3. Discussed with the family       Electronically signed by Wiley Gomez MD  on 5/16/2022 at 8:55 AM   The prep was good. Findings:   Terminal ileum: normal the last 5 cm       Cecum/Ascending colon: normal       Transverse colon: normal       Descending/Sigmoid colon: normal       Rectum/Anus: examined in normal and retroflexed positions and was normal           Random colon biopsies were taken because of the diarrhea       Withdrawal Time was (minutes): 10       The colon was decompressed and the scope was removed. The patient tolerated the procedure well. Recommendations/Plan:   1. Lifestyle and dietary modifications as discussed   2. F/U Biopsies   3. F/U In OfficeYes   4. Discussed with the family   5. Repeat colonoscopy [id number]       Electronically signed by Wiley Gomez MD  on 5/16/2022 at 9:11 AM   -- Diagnosis --   A. SMALL INTESTINE, BIOPSY:        -NO HISTOLOGIC ABNORMALITY IDENTIFIED. B. STOMACH, BIOPSY:        -MINIMAL INACTIVE CHRONIC INFLAMMATION. C.  ESOPHAGUS, BIOPSY (SQUAMOUS MUCOSA AND SCANT GLAND FRAGMENT):        -NO HISTOLOGIC ABNORMALITY IDENTIFIED. D.  COLORECTUM, BIOPSY (RANDOM):        -NO HISTOLOGIC ABNORMALITY IDENTIFIED. Wendie Baltazar M.D.   **Electronically Signed Out**         [facility]/2022     Past Medical,Family, and Social History reviewed and does contribute to the patient presentingcondition. Patient's PMH/PSH,SH,PSYCH Hx, MEDs, ALLERGIES, and ROS were all reviewed and updated in the appropriate sections.     PAST MEDICAL HISTORY:  Past Medical History:   Diagnosis Date    Dorsal back pain 2013    DUB (dysfunctional uterine bleeding) 2013    Menorrhagia 10/20/2014    Other specified fetal and placental problems affecting management of mother, antepartum 2012    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[facility] [address]     Suspected placental problem not found 2012    Thrombocytopenia ([facility] [address]     Uterine leiomyoma 2018    Vitamin D deficiency        Past Surgical History:   Procedure Laterality Date     SECTION, LOW TRANSVERSE  2004     SECTION, LOW TRANSVERSE  2008     SECTION, LOW TRANSVERSE  10/2012    COLONOSCOPY  2022    COLONOSCOPY N/A 2022    COLONOSCOPY WITH BIOPSY performed by Marie Harmon MD at [address] 2020    DILATATION AND CURETTAGE HYSTEROSCOPY, NOVASURE performed by Liv Alonso MD at [address]  10/08/2012    UPPER GASTROINTESTINAL ENDOSCOPY  2022    UPPER GASTROINTESTINAL ENDOSCOPY N/A 2022    EGD BIOPSY performed by Marie Harmon MD at [address] [facility]:    Current Outpatient Medications:     vitamin D (ERGOCALCIFEROL) 1.25 MG (98145 UT) CAPS capsule, Take 1 capsule by mouth once a week, Disp: 12 capsule, Rfl: 1    tranexamic acid (LYSTEDA) 650 MG TABS tablet, Take 2 tablets by mouth 3 times daily for 5 days, Disp: 30 tablet, Rfl: 0    ondansetron (ZOFRAN) 4 MG tablet, Take 1 tablet by mouth every 8 hours as needed for Nausea, Disp: 20 tablet, Rfl: 0    fluticasone (FLONASE) 50 MCG/ACT nasal spray, 2 sprays by Each Nostril route daily, Disp: 16 g, Rfl: 3    loratadine (CLARITIN) 10 MG tablet, Take 1 tablet by mouth daily, Disp: 30 tablet, Rfl: 2    ibuprofen (ADVIL;MOTRIN) 800 MG tablet, Take 1 tablet by mouth every 8 hours as needed for Pain, Disp: 30 tablet, Rfl: 0    ALLERGIES:   Allergies   Allergen Reactions    Seasonal        FAMILY HISTORY:       Problem Relation Age of Onset    High Blood Pressure Mother     High Blood Pressure Father          SOCIAL HISTORY:   Social History     Socioeconomic History    Marital status:      Spouse name: Not on file    Number of children: Not on file    Years of education: Not on file    Highest education level: Not on file   Occupational History    Not on file   Tobacco Use    Smoking status: Never    Smokeless tobacco: Never   Vaping Use    Vaping Use: Never used   Substance and Sexual Activity    Alcohol use: No    Drug use: No    Sexual activity: Yes     Partners: Male     Birth control/protection: Surgical     Comment: tubal   Other Topics Concern    Not on file   Social History Narrative    Not on file     Social Determinants of Health     Financial Resource Strain: Not on file   Food Insecurity: Not on file   Transportation Needs: Not on file   Physical Activity: Not on file   Stress: Not on file   Social Connections: Not on file   Intimate Partner Violence: Not on file   Housing Stability: Not on file       REVIEW OF SYSTEMS: A 12-point review of systemswas obtained and pertinent positives and negatives were enumerated above in the history of present illness. All other reviewed systems / symptoms were negative. Review of Systems   Constitutional:  Negative for appetite change, fatigue and unexpected weight change.    HENT:  Negative for trouble swallowing. Respiratory:  Negative for cough, choking, shortness of breath and wheezing. Cardiovascular:  Negative for chest pain, palpitations and leg swelling. Gastrointestinal:  Negative for abdominal distention, abdominal pain, anal bleeding, blood in stool, constipation, diarrhea, nausea, rectal pain and vomiting. Heartburn   Genitourinary:  Negative for difficulty urinating. Allergic/Immunologic: Negative for environmental allergies and food allergies. Neurological:  Negative for dizziness, weakness, light-headedness, numbness and headaches. Hematological:  Does not bruise/bleed easily. Psychiatric/Behavioral:  Negative for sleep disturbance. The patient is not nervous/anxious. LABORATORY DATA: Reviewed  Lab Results   Component Value Date    WBC 7.9 04/10/2022    HGB 13.3 04/10/2022    HCT 39.5 04/10/2022    MCV 91.7 04/10/2022     (L) 04/10/2022     03/30/2022    K 4.9 03/30/2022     03/30/2022    CO2 23 03/30/2022    BUN 5 (L) 03/30/2022    CREATININE 0.52 03/30/2022    LABALBU 4.5 03/30/2022    BILITOT 0.88 03/30/2022    ALKPHOS 53 03/30/2022    AST 15 03/30/2022    ALT 14 03/30/2022    INR 1.0 06/11/2019         Lab Results   Component Value Date    RBC 4.30 04/10/2022    HGB 13.3 04/10/2022    MCV 91.7 04/10/2022    MCH 30.9 04/10/2022    MCHC 33.7 04/10/2022    RDW 13.3 04/10/2022    MPV 9.5 04/10/2022    BASOPCT 1 04/10/2022    LYMPHSABS 1.20 04/10/2022    MONOSABS 0.40 04/10/2022    NEUTROABS 6.00 04/10/2022    EOSABS 0.20 04/10/2022    BASOSABS 0.10 04/10/2022         DIAGNOSTIC TESTING:     No results found. PHYSICAL EXAMINATION: Vital signs reviewed per the nursing documentation. /71   Pulse 75   Temp 97.2 °F (36.2 °C)   Wt 95 lb (43.1 kg)   BMI 17.38 kg/m²   Body mass index is 17.38 kg/m². Physical Exam  Vitals and nursing note reviewed. Constitutional:       General: She is not in acute distress.      Appearance: She is well-developed. She is not diaphoretic. HENT:      Head: Normocephalic. Mouth/Throat:      Pharynx: No oropharyngeal exudate. Eyes:      General: No scleral icterus. Pupils: Pupils are equal, round, and reactive to light. Neck:      Thyroid: No thyromegaly. Vascular: No JVD. Trachea: No tracheal deviation. Cardiovascular:      Rate and Rhythm: Normal rate and regular rhythm. Heart sounds: Normal heart sounds. No murmur heard. Pulmonary:      Effort: Pulmonary effort is normal. No respiratory distress. Breath sounds: Normal breath sounds. No wheezing. Abdominal:      General: Bowel sounds are normal. There is no distension. Palpations: Abdomen is soft. Tenderness: There is no abdominal tenderness. There is no guarding or rebound. Comments: No ascites   Musculoskeletal:         General: Normal range of motion. Cervical back: Normal range of motion and neck supple. Skin:     General: Skin is warm. Coloration: Skin is not pale. Findings: No erythema or rash. Comments: She is not diaphoretic   Neurological:      Mental Status: She is alert and oriented to person, place, and time. Deep Tendon Reflexes: Reflexes are normal and symmetric. Psychiatric:         Behavior: Behavior normal.         Thought Content: Thought content normal.         Judgment: Judgment normal.         IMPRESSION: Ms. Estefany Adamson is a 38 y.o. female with      Diagnosis Orders   1. Diarrhea, unspecified type        2.  Gastroesophageal reflux disease, unspecified whether esophagitis present          As long the patient is completely asymptomatic we will get a continue to watch  Told her to let me know if there is any recurrence of her symptoms    Diet/life style/natural hx /complication of the dx were all explained in details   Past medical, past surgical, social history, psychiatric history, medications or allergies, all reviewed and  updated    Thank you for allowing me to participate in the care of Ms. Syed Beckham. For any further questions please do not hesitate to contact me. I have reviewed and agree with the ROS entered by the MA/RN. Note is dictated utilizing voice recognition software. Unfortunately this leads to occasional typographical errors. Please contact our office if you have any questions.       Alida Burgos MD  [facility]  O: #[phone]

## 2022-09-19 ENCOUNTER — OFFICE VISIT (OUTPATIENT)
Dept: GASTROENTEROLOGY | Age: 42
End: 2022-09-19
Payer: COMMERCIAL

## 2022-09-19 VITALS
WEIGHT: 95 LBS | BODY MASS INDEX: 17.38 KG/M2 | HEART RATE: 75 BPM | SYSTOLIC BLOOD PRESSURE: 111 MMHG | TEMPERATURE: 97.2 F | DIASTOLIC BLOOD PRESSURE: 71 MMHG

## 2022-09-19 DIAGNOSIS — R19.7 DIARRHEA, UNSPECIFIED TYPE: Primary | ICD-10-CM

## 2022-09-19 DIAGNOSIS — K21.9 GASTROESOPHAGEAL REFLUX DISEASE, UNSPECIFIED WHETHER ESOPHAGITIS PRESENT: ICD-10-CM

## 2022-09-19 PROCEDURE — 99214 OFFICE O/P EST MOD 30 MIN: CPT | Performed by: INTERNAL MEDICINE

## 2022-09-19 ASSESSMENT — ENCOUNTER SYMPTOMS
DIARRHEA: 0
CHOKING: 0
COUGH: 0
BLOOD IN STOOL: 0
TROUBLE SWALLOWING: 0
ABDOMINAL PAIN: 0
WHEEZING: 0
VOMITING: 0
ANAL BLEEDING: 0
RECTAL PAIN: 0
NAUSEA: 0
CONSTIPATION: 0
SHORTNESS OF BREATH: 0
ABDOMINAL DISTENTION: 0

## 2023-01-12 RX ORDER — ERGOCALCIFEROL 1.25 MG/1
50000 CAPSULE ORAL WEEKLY
Qty: 12 CAPSULE | Refills: 1 | Status: SHIPPED | OUTPATIENT
Start: 2023-01-12

## 2023-05-02 ENCOUNTER — HOSPITAL ENCOUNTER (OUTPATIENT)
Dept: MAMMOGRAPHY | Age: 43
Discharge: HOME | End: 2023-05-04
Payer: COMMERCIAL

## 2023-05-02 VITALS — BODY MASS INDEX: 17.85 KG/M2 | HEIGHT: 62 IN | WEIGHT: 97 LBS

## 2023-05-02 DIAGNOSIS — Z12.31 ENCOUNTER FOR SCREENING MAMMOGRAM FOR MALIGNANT NEOPLASM OF BREAST: ICD-10-CM

## 2023-05-02 PROCEDURE — 77063 BREAST TOMOSYNTHESIS BI: CPT

## 2023-05-22 ENCOUNTER — OFFICE VISIT (OUTPATIENT)
Dept: PRIMARY CARE CLINIC | Age: 43
End: 2023-05-22
Payer: COMMERCIAL

## 2023-05-22 VITALS
BODY MASS INDEX: 17.7 KG/M2 | SYSTOLIC BLOOD PRESSURE: 108 MMHG | DIASTOLIC BLOOD PRESSURE: 70 MMHG | RESPIRATION RATE: 14 BRPM | WEIGHT: 96.8 LBS | OXYGEN SATURATION: 99 % | HEART RATE: 70 BPM

## 2023-05-22 DIAGNOSIS — K58.2 IRRITABLE BOWEL SYNDROME WITH BOTH CONSTIPATION AND DIARRHEA: ICD-10-CM

## 2023-05-22 DIAGNOSIS — K21.9 GASTROESOPHAGEAL REFLUX DISEASE WITHOUT ESOPHAGITIS: ICD-10-CM

## 2023-05-22 DIAGNOSIS — M25.50 GENERALIZED JOINT PAIN: Primary | ICD-10-CM

## 2023-05-22 DIAGNOSIS — D69.6 THROMBOCYTOPENIA (HCC): ICD-10-CM

## 2023-05-22 PROCEDURE — 99214 OFFICE O/P EST MOD 30 MIN: CPT | Performed by: NURSE PRACTITIONER

## 2023-05-22 RX ORDER — IBUPROFEN 800 MG/1
800 TABLET ORAL EVERY 8 HOURS PRN
Qty: 30 TABLET | Refills: 2 | Status: SHIPPED | OUTPATIENT
Start: 2023-05-22

## 2023-05-22 ASSESSMENT — PATIENT HEALTH QUESTIONNAIRE - PHQ9
SUM OF ALL RESPONSES TO PHQ QUESTIONS 1-9: 0
1. LITTLE INTEREST OR PLEASURE IN DOING THINGS: 0
2. FEELING DOWN, DEPRESSED OR HOPELESS: 0
SUM OF ALL RESPONSES TO PHQ9 QUESTIONS 1 & 2: 0
SUM OF ALL RESPONSES TO PHQ QUESTIONS 1-9: 0

## 2023-05-22 ASSESSMENT — ENCOUNTER SYMPTOMS
SORE THROAT: 0
SHORTNESS OF BREATH: 0
ABDOMINAL PAIN: 0
CHEST TIGHTNESS: 0
VOMITING: 0
RHINORRHEA: 0
NAUSEA: 0
COLOR CHANGE: 0

## 2023-05-22 NOTE — PROGRESS NOTES
Pneumococcal 0-64 years Vaccine  Aged Out    Depression Monitoring  Discontinued    Hepatitis C screen  Discontinued       Subjective:      Review of Systems   Constitutional:  Negative for activity change, fatigue and fever. HENT:  Negative for congestion, rhinorrhea and sore throat. Eyes:  Negative for visual disturbance. Respiratory:  Negative for chest tightness and shortness of breath. Cardiovascular:  Negative for chest pain and palpitations. Gastrointestinal:  Positive for constipation and diarrhea. Negative for abdominal pain, blood in stool, nausea and vomiting. Endocrine: Negative for polydipsia. Genitourinary:  Negative for difficulty urinating. Musculoskeletal:  Negative for arthralgias and myalgias. Skin:  Negative for color change. Neurological:  Negative for weakness and headaches. Psychiatric/Behavioral:  Negative for behavioral problems. The patient is not nervous/anxious. All other systems reviewed and are negative. Objective:   /70   Pulse 70   Resp 14   Wt 96 lb 12.8 oz (43.9 kg)   LMP 04/05/2023   SpO2 99%   BMI 17.70 kg/m²   Physical Exam  Vitals reviewed. Constitutional:       General: She is not in acute distress. Appearance: Normal appearance. Comments: Thin, weight stable   HENT:      Head: Normocephalic. Eyes:      Pupils: Pupils are equal, round, and reactive to light. Cardiovascular:      Rate and Rhythm: Normal rate and regular rhythm. Pulses: Normal pulses. Heart sounds: Normal heart sounds. Pulmonary:      Effort: Pulmonary effort is normal.      Breath sounds: Normal breath sounds. Abdominal:      General: There is no distension. Musculoskeletal:         General: Normal range of motion. Cervical back: Neck supple. Right lower leg: No edema. Left lower leg: No edema. Lymphadenopathy:      Cervical: No cervical adenopathy. Skin:     General: Skin is warm and dry.       Capillary Refill: Capillary

## 2023-05-24 PROBLEM — K21.9 GASTROESOPHAGEAL REFLUX DISEASE WITHOUT ESOPHAGITIS: Status: ACTIVE | Noted: 2023-05-24

## 2023-05-24 PROBLEM — M25.50 GENERALIZED JOINT PAIN: Status: ACTIVE | Noted: 2023-05-24

## 2023-05-24 PROBLEM — K58.2 IRRITABLE BOWEL SYNDROME WITH BOTH CONSTIPATION AND DIARRHEA: Status: ACTIVE | Noted: 2023-05-24

## 2023-05-24 ASSESSMENT — ENCOUNTER SYMPTOMS
BLOOD IN STOOL: 0
DIARRHEA: 1
CONSTIPATION: 1

## 2023-07-04 DIAGNOSIS — K21.00 GASTROESOPHAGEAL REFLUX DISEASE WITH ESOPHAGITIS WITHOUT HEMORRHAGE: ICD-10-CM

## 2023-07-05 RX ORDER — PANTOPRAZOLE SODIUM 20 MG/1
TABLET, DELAYED RELEASE ORAL
Qty: 30 TABLET | Refills: 0 | Status: SHIPPED | OUTPATIENT
Start: 2023-07-05

## 2023-08-15 RX ORDER — ERGOCALCIFEROL 1.25 MG/1
50000 CAPSULE ORAL WEEKLY
Qty: 12 CAPSULE | Refills: 1 | Status: SHIPPED | OUTPATIENT
Start: 2023-08-15

## 2024-02-06 RX ORDER — TRANEXAMIC ACID 650 MG/1
1300 TABLET ORAL 3 TIMES DAILY
Qty: 30 TABLET | Refills: 0 | Status: SHIPPED | OUTPATIENT
Start: 2024-02-06 | End: 2024-02-11

## 2024-02-07 ENCOUNTER — TELEPHONE (OUTPATIENT)
Dept: PRIMARY CARE CLINIC | Age: 44
End: 2024-02-07

## 2024-02-07 NOTE — TELEPHONE ENCOUNTER
Patient called stating she has been on her  menstrual cycle for almost a month and feels very weak. Patient stated does not have an OB.    Patient was advised of ER.    Patient stated she really only wants to see PCP.    Appointment made by Carmella for 02/08/24.

## 2024-02-08 ENCOUNTER — TELEMEDICINE (OUTPATIENT)
Dept: PRIMARY CARE CLINIC | Age: 44
End: 2024-02-08
Payer: COMMERCIAL

## 2024-02-08 ENCOUNTER — HOSPITAL ENCOUNTER (OUTPATIENT)
Age: 44
Setting detail: SPECIMEN
Discharge: HOME | End: 2024-02-08

## 2024-02-08 DIAGNOSIS — N93.9 ABNORMAL UTERINE BLEEDING (AUB): Primary | ICD-10-CM

## 2024-02-08 DIAGNOSIS — N93.9 ABNORMAL UTERINE BLEEDING (AUB): ICD-10-CM

## 2024-02-08 DIAGNOSIS — R53.83 FATIGUE, UNSPECIFIED TYPE: ICD-10-CM

## 2024-02-08 LAB
BASOPHILS # BLD: 0.03 K/UL (ref 0–0.2)
BASOPHILS NFR BLD: 0 % (ref 0–2)
EOSINOPHIL # BLD: 0.39 K/UL (ref 0–0.44)
EOSINOPHILS RELATIVE PERCENT: 5 % (ref 1–4)
ERYTHROCYTE [DISTWIDTH] IN BLOOD BY AUTOMATED COUNT: 13.2 % (ref 11.8–14.4)
FERRITIN SERPL-MCNC: 30 NG/ML (ref 13–150)
HCT VFR BLD AUTO: 39.5 % (ref 36.3–47.1)
HGB BLD-MCNC: 12.8 G/DL (ref 11.9–15.1)
IMM GRANULOCYTES # BLD AUTO: 0.03 K/UL (ref 0–0.3)
IMM GRANULOCYTES NFR BLD: 0 %
IRON SATN MFR SERPL: 37 % (ref 20–55)
IRON SERPL-MCNC: 120 UG/DL (ref 37–145)
LYMPHOCYTES NFR BLD: 1.5 K/UL (ref 1.1–3.7)
LYMPHOCYTES RELATIVE PERCENT: 20 % (ref 24–43)
MCH RBC QN AUTO: 31 PG (ref 25.2–33.5)
MCHC RBC AUTO-ENTMCNC: 32.4 G/DL (ref 28.4–34.8)
MCV RBC AUTO: 95.6 FL (ref 82.6–102.9)
MONOCYTES NFR BLD: 0.65 K/UL (ref 0.1–1.2)
MONOCYTES NFR BLD: 9 % (ref 3–12)
NEUTROPHILS NFR BLD: 66 % (ref 36–65)
NEUTS SEG NFR BLD: 5.01 K/UL (ref 1.5–8.1)
NRBC BLD-RTO: 0 PER 100 WBC
PLATELET # BLD AUTO: 132 K/UL (ref 138–453)
PMV BLD AUTO: 12.8 FL (ref 8.1–13.5)
RBC # BLD AUTO: 4.13 M/UL (ref 3.95–5.11)
TIBC SERPL-MCNC: 321 UG/DL (ref 250–450)
UNSATURATED IRON BINDING CAPACITY: 201 UG/DL (ref 112–347)
WBC OTHER # BLD: 7.6 K/UL (ref 3.5–11.3)

## 2024-02-08 PROCEDURE — 99214 OFFICE O/P EST MOD 30 MIN: CPT | Performed by: NURSE PRACTITIONER

## 2024-02-08 RX ORDER — LANOLIN ALCOHOL/MO/W.PET/CERES
325 CREAM (GRAM) TOPICAL 2 TIMES DAILY
Qty: 60 TABLET | Refills: 3 | Status: SHIPPED | OUTPATIENT
Start: 2024-02-08

## 2024-02-08 ASSESSMENT — ENCOUNTER SYMPTOMS
DIARRHEA: 0
SORE THROAT: 0
SHORTNESS OF BREATH: 0
VOMITING: 0
NAUSEA: 0
CHEST TIGHTNESS: 0
ABDOMINAL PAIN: 0
COLOR CHANGE: 0
RHINORRHEA: 0

## 2024-02-08 ASSESSMENT — PATIENT HEALTH QUESTIONNAIRE - PHQ9
SUM OF ALL RESPONSES TO PHQ QUESTIONS 1-9: 0
SUM OF ALL RESPONSES TO PHQ QUESTIONS 1-9: 0
SUM OF ALL RESPONSES TO PHQ9 QUESTIONS 1 & 2: 0
SUM OF ALL RESPONSES TO PHQ QUESTIONS 1-9: 0
1. LITTLE INTEREST OR PLEASURE IN DOING THINGS: 0
SUM OF ALL RESPONSES TO PHQ QUESTIONS 1-9: 0
2. FEELING DOWN, DEPRESSED OR HOPELESS: 0

## 2024-02-08 NOTE — PROGRESS NOTES
2024    TELEHEALTH EVALUATION -- Audio/Visual    HPI:    Vin Escalera (:  1980) has requested an audio/video evaluation for the following concern(s):    Presents with acute complaint continuous vaginal bleeding x 1 month   Hx of fibroid removal, we discussed pelvic US to evaluate further and f/u with GYN who she is already established with but she would lke new referral for someone closer to her residence   Does have some fatigue due to prolonged bleeding  Open to trying PO iron and getting labs to evaluate further, suspect SONJA contributing to feeling tired   No bowel or bladder complaints    Review of Systems   Constitutional:  Negative for activity change, fatigue and fever.   HENT:  Negative for congestion, rhinorrhea and sore throat.    Eyes:  Negative for visual disturbance.   Respiratory:  Negative for chest tightness and shortness of breath.    Cardiovascular:  Negative for chest pain and palpitations.   Gastrointestinal:  Negative for abdominal pain, diarrhea, nausea and vomiting.   Endocrine: Negative for polydipsia.   Genitourinary:  Positive for menstrual problem. Negative for difficulty urinating.   Musculoskeletal:  Negative for arthralgias and myalgias.   Skin:  Negative for color change.   Neurological:  Negative for weakness and headaches.   Psychiatric/Behavioral:  Negative for behavioral problems. The patient is not nervous/anxious.    All other systems reviewed and are negative.      Prior to Visit Medications    Medication Sig Taking? Authorizing Provider   ferrous sulfate (FE TABS) 325 (65 Fe) MG EC tablet Take 1 tablet by mouth 2 times daily Yes Rima Porter, APRN - CNP   tranexamic acid (LYSTEDA) 650 MG TABS tablet Take 2 tablets by mouth 3 times daily for 5 days Yes Luz Ferguson MD   vitamin D (ERGOCALCIFEROL) 1.25 MG (68129 UT) CAPS capsule Take 1 capsule by mouth once a week Yes Riam Porter APRN - CNP   pantoprazole (PROTONIX) 20 MG tablet TAKE 1 TABLET

## 2024-02-09 ENCOUNTER — TELEPHONE (OUTPATIENT)
Dept: PRIMARY CARE CLINIC | Age: 44
End: 2024-02-09

## 2024-02-09 NOTE — TELEPHONE ENCOUNTER
----- Message from Ayana Yonimaggy sent at 2/9/2024 12:45 PM EST -----  Subject: Results Request    QUESTIONS  Results: Blood test ; Ordered by:   Date Performed: 2024-02-08  ---------------------------------------------------------------------------  --------------  CALL BACK INFO    [phone]; OK to leave message on voicemail  ---------------------------------------------------------------------------  --------------

## 2024-02-12 ENCOUNTER — TELEPHONE (OUTPATIENT)
Dept: PRIMARY CARE CLINIC | Age: 44
End: 2024-02-12

## 2024-02-12 NOTE — TELEPHONE ENCOUNTER
Call made to the patient to inform them of their PCP's instructions, writer left a voice message for the patient to contact the office back to further discuss the PCP's instructions.

## 2024-02-12 NOTE — TELEPHONE ENCOUNTER
I gave her oral iron, that won't stop the bleeding but was meant to help with fatigue from blood loss.

## 2024-02-12 NOTE — TELEPHONE ENCOUNTER
Patient called stating she is still having vaginal bleeding the medication she was given is not working, she would like to know if Pcp can change the dose.

## 2024-02-14 ENCOUNTER — TELEPHONE (OUTPATIENT)
Dept: PRIMARY CARE CLINIC | Age: 44
End: 2024-02-14

## 2024-02-14 DIAGNOSIS — N93.9 ABNORMAL UTERINE BLEEDING (AUB): Primary | ICD-10-CM

## 2024-02-14 NOTE — TELEPHONE ENCOUNTER
[facility] called and said Pt is scheduled tomorrow to get a US Pelvic Complete. They asked if Rima wanted a Transvaginal US as well, they are assuming yes but they would need an order, please advise, said they can see in system once placed.

## 2024-02-15 ENCOUNTER — HOSPITAL ENCOUNTER (OUTPATIENT)
Dept: ULTRASOUND IMAGING | Age: 44
Discharge: HOME | End: 2024-02-17
Payer: COMMERCIAL

## 2024-02-15 DIAGNOSIS — N93.9 ABNORMAL UTERINE BLEEDING (AUB): ICD-10-CM

## 2024-02-15 PROCEDURE — 76830 TRANSVAGINAL US NON-OB: CPT

## 2024-02-15 PROCEDURE — 76856 US EXAM PELVIC COMPLETE: CPT

## 2024-02-16 ENCOUNTER — TELEPHONE (OUTPATIENT)
Dept: OBGYN CLINIC | Age: 44
End: 2024-02-16

## 2024-02-16 ENCOUNTER — TELEPHONE (OUTPATIENT)
Dept: FAMILY MEDICINE CLINIC | Age: 44
End: 2024-02-16

## 2024-02-16 NOTE — TELEPHONE ENCOUNTER
Writer called patient to inform US results. Writer advised patient of PCP instruction to follow up with GYN. Patient states she could not get in with GYN until 3/14/24 and asked if she can be seen in the walk-in clinic to discuss her problems with abnormal bleeding. Writer informed patient she would send a message to walk-in provider to see if the walk-in clinic will be any further help for her.     Please advise, thank you.

## 2024-03-12 ENCOUNTER — HOSPITAL ENCOUNTER (OUTPATIENT)
Age: 44
Setting detail: SPECIMEN
Discharge: HOME | End: 2024-03-12

## 2024-03-12 ENCOUNTER — OFFICE VISIT (OUTPATIENT)
Dept: OBGYN CLINIC | Age: 44
End: 2024-03-12
Payer: COMMERCIAL

## 2024-03-12 VITALS
DIASTOLIC BLOOD PRESSURE: 70 MMHG | SYSTOLIC BLOOD PRESSURE: 100 MMHG | BODY MASS INDEX: 17.48 KG/M2 | HEIGHT: 62 IN | WEIGHT: 95 LBS

## 2024-03-12 DIAGNOSIS — Z01.419 WELL WOMAN EXAM: Primary | ICD-10-CM

## 2024-03-12 DIAGNOSIS — Z98.890 HISTORY OF ENDOMETRIAL ABLATION: ICD-10-CM

## 2024-03-12 DIAGNOSIS — Z01.419 WELL WOMAN EXAM: ICD-10-CM

## 2024-03-12 DIAGNOSIS — N80.03 ADENOMYOSIS: ICD-10-CM

## 2024-03-12 DIAGNOSIS — N89.8 VAGINAL DISCHARGE: ICD-10-CM

## 2024-03-12 DIAGNOSIS — N93.9 ABNORMAL UTERINE BLEEDING (AUB): ICD-10-CM

## 2024-03-12 PROCEDURE — 99386 PREV VISIT NEW AGE 40-64: CPT | Performed by: ADVANCED PRACTICE MIDWIFE

## 2024-03-12 NOTE — PROGRESS NOTES
Prolonged bleeding since January, on and off  Before this had normal periods  Feels weakness  No pain      Chaperone for Intimate Exam  Chaperone was offered as part of the rooming process. Patient declined and agrees to continue with exam without a chaperone.  Chaperone: n/a

## 2024-03-12 NOTE — PROGRESS NOTES
[facility] [address]  [address]  [facility] [address]  Dept: [phone]    Patient Name: Vin Escalera  Patient Age: 43 y.o.  Date of Visit: 3/12/2024    Subjective  Chief Complaint   Patient presents with    New Patient    ABNORMAL BLEEDING     No LMP recorded. (Menstrual status: Other - See Notes).    Chaperone for Intimate Exam  Chaperone was offered as part of the rooming process. Patient declined and agrees to continue with exam without a chaperone.  Chaperone: n/a    HPI  Patient arrives for annual exam as a new patient to the practice, and new to me.  Vin Escalera does admit to any concerns today. Vin would like to discuss abnormal uterine bleeding. Had an endometrial ablation in 2020 due to abnormal uterine bleeding. Reports it stopped for a year and then returned. Reports since then having irregular/abnormal uterine bleeding. Reports in 2022 having an endometrial biopsy. Reports having an ultrasound in 2/24.    Reported previous gyn history:  Menstrual cycles began at the age of 12/15  Vin Escalera reports having monthly menstrual cycles  Reports menstrual cycles usually last 3-4 days.   Reports menstrual flow during cycle is moderate  Vin Escalera does report cramping with menstrual cycle  Vin Escalera does not currently have concerns about her menstrual cycle.  Vin Escalera reports a history of dysmenorrhea and heavy menstrual bleeding. And her current treatment plan includes has a history of uterine ablation.    Patient reports is not sexually active. Has male parnter  Reports is  protecting against a pregnancy. Current method(s) including has a hx of ablation with tubal  Patient is not using a barrier method with sexual encounters   Patient denies a history of sexually transmitted infection(s)   If yes, including none  Patient does not want screening for sexually transmitted infection(s).   Vin Escalera

## 2024-03-13 DIAGNOSIS — B96.89 BV (BACTERIAL VAGINOSIS): Primary | ICD-10-CM

## 2024-03-13 DIAGNOSIS — N76.0 BV (BACTERIAL VAGINOSIS): Primary | ICD-10-CM

## 2024-03-13 LAB
CANDIDA SPECIES: NEGATIVE
GARDNERELLA VAGINALIS: POSITIVE
SOURCE: ABNORMAL
TRICHOMONAS: NEGATIVE

## 2024-03-13 RX ORDER — METRONIDAZOLE 500 MG/1
500 TABLET ORAL 2 TIMES DAILY
Qty: 14 TABLET | Refills: 0 | Status: SHIPPED | OUTPATIENT
Start: 2024-03-13 | End: 2024-03-20

## 2024-03-14 LAB
HPV I/H RISK 4 DNA CVX QL NAA+PROBE: NOT DETECTED
HPV SAMPLE: NORMAL
HPV, INTERPRETATION: NORMAL
HPV16 DNA CVX QL NAA+PROBE: NOT DETECTED
HPV18 DNA CVX QL NAA+PROBE: NOT DETECTED
SPECIMEN DESCRIPTION: NORMAL

## 2024-03-25 LAB — CYTOLOGY REPORT: NORMAL

## 2024-04-02 ENCOUNTER — TELEPHONE (OUTPATIENT)
Dept: OBGYN CLINIC | Age: 44
End: 2024-04-02

## 2024-04-02 NOTE — TELEPHONE ENCOUNTER
Pt advised to go to ER for acute bleeding and scheduled for a consult with Dr. Drake. Pt expressed understanding of all instructions.

## 2024-04-02 NOTE — TELEPHONE ENCOUNTER
If concerns for acute bleeding needs to be seen in ER otherwise recommend consultation with  thank you

## 2024-04-02 NOTE — TELEPHONE ENCOUNTER
Pt bleeding through 4/5 pads a day bleeding for 5/6 days. Pt advised to go to ER for bleeding through more than 1 pad an hour, if she feels light headed, dizzy. Appt scheduled for today-want to make sure there is no confusion on heaviness of bleeding.

## 2024-04-17 ENCOUNTER — OFFICE VISIT (OUTPATIENT)
Dept: OBGYN CLINIC | Age: 44
End: 2024-04-17
Payer: COMMERCIAL

## 2024-04-17 VITALS — WEIGHT: 94.5 LBS | BODY MASS INDEX: 17.28 KG/M2 | SYSTOLIC BLOOD PRESSURE: 110 MMHG | DIASTOLIC BLOOD PRESSURE: 62 MMHG

## 2024-04-17 DIAGNOSIS — N93.9 ABNORMAL UTERINE BLEEDING (AUB): Primary | ICD-10-CM

## 2024-04-17 DIAGNOSIS — Z98.890 HISTORY OF ENDOMETRIAL ABLATION: ICD-10-CM

## 2024-04-17 DIAGNOSIS — N89.8 VAGINAL ITCHING: ICD-10-CM

## 2024-04-17 DIAGNOSIS — N92.1 IRREGULAR INTERMENSTRUAL BLEEDING: ICD-10-CM

## 2024-04-17 PROCEDURE — 99214 OFFICE O/P EST MOD 30 MIN: CPT | Performed by: STUDENT IN AN ORGANIZED HEALTH CARE EDUCATION/TRAINING PROGRAM

## 2024-04-17 RX ORDER — METRONIDAZOLE 7.5 MG/G
1 GEL VAGINAL DAILY
Qty: 70 G | Refills: 0 | Status: SHIPPED | OUTPATIENT
Start: 2024-04-17 | End: 2024-04-22

## 2024-04-17 NOTE — PROGRESS NOTES
[facility] Problem Visit    Vin Escalera  2024                       Primary Care Physician: Rima Porter, APRN - CNP    CC:   Chief Complaint   Patient presents with    Menorrhagia         HPI: Vin Escalera is a 43 y.o. female      The patient was seen and examined. She is here for AUB. Heavy bleeding started in January. Has tried Lysteda. Uses 4-5 pads a day on heaviest days. Passing clots. Not very painful. History of endometrial ablation .  Discussed need to evaluate the endometrium due to abnormal uterine bleeding.  Recommended hysteroscopy dilation curettage in the operating room given prior history of endometrial ablation.  Patient amenable.  Discussed treatment with daily progesterone for the bleeding.  Discussed all options of medical versus surgical management.  Will start with hysteroscopy dilation and curettage as well as progesterone and wait for pathology and response of progesterone to determine further steps.  All questions answered.    -US 2024: Uterus 8.4 x 6.2 x 4.7 cm.  ES 1.1 cm.  Right and left ovaries normal.  Myometrial features suggestive of adenomyosis.  For pelvic congestion syndrome.  -pap 3/2024 NILM HPV neg    The patient was counseled on risks, benefits, alternatives to surgery including pain, bleeding, infection, scarring, damage to surrounding structures (bowel, bladder, ureters, uterus, tubes, ovaries, nerves, arteries, veins), need for further procedures, need for blood or blood products, post operative complications (pneumonia, blood clots), anesthesia complications, injury or death. Verbal and informed consent obtained.       Review of Systems:   Constitutional: negative fever, negative chills  Cardiovasc: negative dyspnea, negative cough, negative chest pain,  negative palpitations  Gastrointestinal: negative abdominal pain, negative RUQ pain, negative N/V, negative diarrhea, negative constipation  Genitourinary: negative dysuria, negative

## 2024-04-18 ENCOUNTER — PREP FOR PROCEDURE (OUTPATIENT)
Dept: OBGYN CLINIC | Age: 44
End: 2024-04-18

## 2024-04-18 DIAGNOSIS — Z01.818 PRE-OP TESTING: Primary | ICD-10-CM

## 2024-04-26 RX ORDER — ERGOCALCIFEROL 1.25 MG/1
50000 CAPSULE ORAL WEEKLY
Qty: 12 CAPSULE | Refills: 1 | Status: SHIPPED | OUTPATIENT
Start: 2024-04-26

## 2024-05-14 ENCOUNTER — TELEPHONE (OUTPATIENT)
Dept: OBGYN CLINIC | Age: 44
End: 2024-05-14

## 2024-05-29 ENCOUNTER — HOSPITAL ENCOUNTER (OUTPATIENT)
Age: 44
Setting detail: SPECIMEN
Discharge: HOME | End: 2024-05-29

## 2024-05-29 DIAGNOSIS — Z01.818 PRE-OP TESTING: ICD-10-CM

## 2024-05-29 LAB
B-HCG SERPL EIA 3RD IS-ACNC: <0.2 MIU/ML (ref 0–7)
BACTERIA URNS QL MICRO: ABNORMAL
BASOPHILS # BLD: 0.03 K/UL (ref 0–0.2)
BASOPHILS NFR BLD: 1 % (ref 0–2)
BILIRUB UR QL STRIP: NEGATIVE
CASTS #/AREA URNS LPF: ABNORMAL /LPF (ref 0–8)
CLARITY UR: CLEAR
COLOR UR: YELLOW
EOSINOPHIL # BLD: 0.24 K/UL (ref 0–0.44)
EOSINOPHILS RELATIVE PERCENT: 4 % (ref 1–4)
EPI CELLS #/AREA URNS HPF: ABNORMAL /HPF (ref 0–5)
ERYTHROCYTE [DISTWIDTH] IN BLOOD BY AUTOMATED COUNT: 12.6 % (ref 11.8–14.4)
GLUCOSE UR STRIP-MCNC: NEGATIVE MG/DL
HCT VFR BLD AUTO: 38.4 % (ref 36.3–47.1)
HGB BLD-MCNC: 12.3 G/DL (ref 11.9–15.1)
HGB UR QL STRIP.AUTO: ABNORMAL
IMM GRANULOCYTES # BLD AUTO: <0.03 K/UL (ref 0–0.3)
IMM GRANULOCYTES NFR BLD: 0 %
KETONES UR STRIP-MCNC: NEGATIVE MG/DL
LEUKOCYTE ESTERASE UR QL STRIP: NEGATIVE
LYMPHOCYTES NFR BLD: 1.9 K/UL (ref 1.1–3.7)
LYMPHOCYTES RELATIVE PERCENT: 30 % (ref 24–43)
MCH RBC QN AUTO: 29.6 PG (ref 25.2–33.5)
MCHC RBC AUTO-ENTMCNC: 32 G/DL (ref 28.4–34.8)
MCV RBC AUTO: 92.5 FL (ref 82.6–102.9)
MONOCYTES NFR BLD: 0.53 K/UL (ref 0.1–1.2)
MONOCYTES NFR BLD: 8 % (ref 3–12)
NEUTROPHILS NFR BLD: 57 % (ref 36–65)
NEUTS SEG NFR BLD: 3.63 K/UL (ref 1.5–8.1)
NITRITE UR QL STRIP: NEGATIVE
NRBC BLD-RTO: 0 PER 100 WBC
PH UR STRIP: 6 [PH] (ref 5–8)
PLATELET # BLD AUTO: 182 K/UL (ref 138–453)
PMV BLD AUTO: 12 FL (ref 8.1–13.5)
PROT UR STRIP-MCNC: NEGATIVE MG/DL
RBC # BLD AUTO: 4.15 M/UL (ref 3.95–5.11)
RBC #/AREA URNS HPF: ABNORMAL /HPF (ref 0–4)
SP GR UR STRIP: 1.02 (ref 1–1.03)
UROBILINOGEN UR STRIP-ACNC: NORMAL EU/DL (ref 0–1)
WBC #/AREA URNS HPF: ABNORMAL /HPF (ref 0–5)
WBC OTHER # BLD: 6.3 K/UL (ref 3.5–11.3)

## 2024-05-30 LAB
MICROORGANISM SPEC CULT: NO GROWTH
SERVICE CMNT-IMP: NORMAL
SPECIMEN DESCRIPTION: NORMAL

## 2024-06-05 ENCOUNTER — ANESTHESIA EVENT (OUTPATIENT)
Dept: OPERATING ROOM | Age: 44
End: 2024-06-05
Payer: COMMERCIAL

## 2024-06-05 NOTE — H&P
OB/GYN Pre-Op H&P  [facility]    Patient Name: Vin Escalera     Patient : 1980  Room/Bed: [facility] OR [place] RM/NONE  Admission Date/Time: 2024  6:29 AM  Primary Care Physician: Rima oPrter APRN - CNP  MRN: [id number]    Date: 2024  Time: 7:07 AM    The patient was seen in pre-op holding. She is here for Hysteroscopy Dilation & Curettage.    Vin Escalera 44 y.o. female  presents for surgical evaluation and management of Abnormal Uterine Bleeding (AUB). Ultrasound in 2024 revealed a uterus measuring 8.4x6.2x4.7 cm with endometrial stripe of 1.1 cm and findings suggestive of adenomyosis. Pap smear on 3/2024 was NILM. The patient has a history of endometrial ablation in . The patient would like to proceed to the operating room for surgical management at this time.     The procedure risks and complications were reviewed.  The labs, Consent, and H&P were reviewed and updated.  The patient was counseled on the possibility of  the need of a second surgery.  The patient voiced understanding and had all of her questions answered. The possibility of incomplete removal of abnormal tissue was discussed.    OBSTETRICAL HISTORY:   OB History    Para Term  AB Living   4 2 2 0 1 3   SAB IAB Ectopic Molar Multiple Live Births   1 0 0 0 0 3      # Outcome Date GA Lbr Kasi/2nd Weight Sex Delivery Anes PTL Lv   4  10/08/12 39w0d  2.835 kg (6 lb 4 oz) M CS-Unspec   GISSELL      Apgar1: 8  Apgar5: 9   3 Term 2008     CS-Unspec   GISSELL   2 Term 2004     CS-Unspec   GISSELL   1 SAB                PAST MEDICAL HISTORY:   has a past medical history of Dorsal back pain, DUB (dysfunctional uterine bleeding), Menorrhagia, Other specified fetal and placental problems affecting management of mother, antepartum, 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HCC), Suspected placental problem not found, Thrombocytopenia (HCC), Uterine leiomyoma, and Vitamin D deficiency.    PAST SURGICAL HISTORY:   has a  past surgical history that includes Tubal ligation (10/08/2012);  section, low transverse (2004);  section, low transverse (2008);  section, low transverse (10/2012); Dilation and curettage of uterus (N/A, 2020); Upper gastrointestinal endoscopy (2022); Colonoscopy (2022); Upper gastrointestinal endoscopy (N/A, 2022); and Colonoscopy (N/A, 2022).    ALLERGIES:  Allergies as of 2024 - Fully Reviewed 2024   Allergen Reaction Noted    Seasonal  2022       MEDICATIONS:  Current Facility-Administered Medications   Medication Dose Route Frequency Provider Last Rate Last Admin    strong iodine 5 % solution                FAMILY HISTORY:  family history includes High Blood Pressure in her father and mother.    SOCIAL HISTORY:   reports that she has never smoked. She has never used smokeless tobacco. She reports that she does not drink alcohol and does not use drugs.    VITALS:  BP (!) 142/75   Pulse 79   Temp 98.2 °F (36.8 °C) (Infrared)   Resp 16   Ht 1.575 m (5' 2\")   Wt 46 kg (101 lb 6.4 oz)   LMP 2024   SpO2 98%   BMI 18.55 kg/m²                                                                                                                            PHYSICAL EXAM:     Unchanged from Prior H&P  CONSTITUTIONAL:  Alert and oriented, no acute distress  HEAD: normocephalic, atraumatic  EYES: Pupils equal and reactive to light, Extraocular muscles intact, sclera non icteric  ENT: Mucus membranes moist, No otorrhea, no rhinorrhea  NECK:  supple, symmetrical, trachea midline   LUNGS:  No increased WOBb  CARDIOVASCULAR: Regular rate  ABDOMEN: soft, non tender, non distended, no rebound or guarding, no hernias, no hepatomegaly, no splenomegly  MUSCULOSKELETAL:  Equal strength bilaterally, normal muscle tone  SKIN: No abscess or rash  NEUROLOGIC:  Cranial nerves 2-12 grossly intact, no focal deficits  PSYCH: affect appropriate  Pelvic Exam:

## 2024-06-06 ENCOUNTER — ANESTHESIA (OUTPATIENT)
Dept: OPERATING ROOM | Age: 44
End: 2024-06-06
Payer: COMMERCIAL

## 2024-06-06 ENCOUNTER — HOSPITAL ENCOUNTER (OUTPATIENT)
Age: 44
Setting detail: OUTPATIENT SURGERY
Discharge: HOME | End: 2024-06-06
Attending: STUDENT IN AN ORGANIZED HEALTH CARE EDUCATION/TRAINING PROGRAM | Admitting: STUDENT IN AN ORGANIZED HEALTH CARE EDUCATION/TRAINING PROGRAM
Payer: COMMERCIAL

## 2024-06-06 VITALS
RESPIRATION RATE: 21 BRPM | BODY MASS INDEX: 18.66 KG/M2 | OXYGEN SATURATION: 100 % | SYSTOLIC BLOOD PRESSURE: 155 MMHG | HEIGHT: 62 IN | WEIGHT: 101.4 LBS | DIASTOLIC BLOOD PRESSURE: 79 MMHG | TEMPERATURE: 96.8 F | HEART RATE: 64 BPM

## 2024-06-06 DIAGNOSIS — M25.50 GENERALIZED JOINT PAIN: ICD-10-CM

## 2024-06-06 DIAGNOSIS — Z98.890 HISTORY OF ENDOMETRIAL ABLATION: ICD-10-CM

## 2024-06-06 DIAGNOSIS — N93.9 ABNORMAL UTERINE BLEEDING (AUB): ICD-10-CM

## 2024-06-06 LAB — HCG, PREGNANCY URINE (POC): NEGATIVE

## 2024-06-06 PROCEDURE — 7100000011 HC PHASE II RECOVERY - ADDTL 15 MIN: Performed by: STUDENT IN AN ORGANIZED HEALTH CARE EDUCATION/TRAINING PROGRAM

## 2024-06-06 PROCEDURE — 88305 TISSUE EXAM BY PATHOLOGIST: CPT

## 2024-06-06 PROCEDURE — 2580000003 HC RX 258: Performed by: ANESTHESIOLOGY

## 2024-06-06 PROCEDURE — 2709999900 HC NON-CHARGEABLE SUPPLY: Performed by: STUDENT IN AN ORGANIZED HEALTH CARE EDUCATION/TRAINING PROGRAM

## 2024-06-06 PROCEDURE — 58558 HYSTEROSCOPY BIOPSY: CPT | Performed by: STUDENT IN AN ORGANIZED HEALTH CARE EDUCATION/TRAINING PROGRAM

## 2024-06-06 PROCEDURE — 7100000000 HC PACU RECOVERY - FIRST 15 MIN: Performed by: STUDENT IN AN ORGANIZED HEALTH CARE EDUCATION/TRAINING PROGRAM

## 2024-06-06 PROCEDURE — 3700000001 HC ADD 15 MINUTES (ANESTHESIA): Performed by: STUDENT IN AN ORGANIZED HEALTH CARE EDUCATION/TRAINING PROGRAM

## 2024-06-06 PROCEDURE — 7100000010 HC PHASE II RECOVERY - FIRST 15 MIN: Performed by: STUDENT IN AN ORGANIZED HEALTH CARE EDUCATION/TRAINING PROGRAM

## 2024-06-06 PROCEDURE — 3600000003 HC SURGERY LEVEL 3 BASE: Performed by: STUDENT IN AN ORGANIZED HEALTH CARE EDUCATION/TRAINING PROGRAM

## 2024-06-06 PROCEDURE — 7100000001 HC PACU RECOVERY - ADDTL 15 MIN: Performed by: STUDENT IN AN ORGANIZED HEALTH CARE EDUCATION/TRAINING PROGRAM

## 2024-06-06 PROCEDURE — 3700000000 HC ANESTHESIA ATTENDED CARE: Performed by: STUDENT IN AN ORGANIZED HEALTH CARE EDUCATION/TRAINING PROGRAM

## 2024-06-06 PROCEDURE — 81025 URINE PREGNANCY TEST: CPT

## 2024-06-06 PROCEDURE — 3600000013 HC SURGERY LEVEL 3 ADDTL 15MIN: Performed by: STUDENT IN AN ORGANIZED HEALTH CARE EDUCATION/TRAINING PROGRAM

## 2024-06-06 PROCEDURE — 2500000003 HC RX 250 WO HCPCS

## 2024-06-06 PROCEDURE — 6360000002 HC RX W HCPCS

## 2024-06-06 RX ORDER — ACETAMINOPHEN 500 MG
1000 TABLET ORAL EVERY 6 HOURS PRN
Qty: 30 TABLET | Refills: 1 | Status: SHIPPED | OUTPATIENT
Start: 2024-06-06

## 2024-06-06 RX ORDER — LIDOCAINE HYDROCHLORIDE 10 MG/ML
1 INJECTION, SOLUTION EPIDURAL; INFILTRATION; INTRACAUDAL; PERINEURAL
Status: DISCONTINUED | OUTPATIENT
Start: 2024-06-06 | End: 2024-06-06 | Stop reason: HOSPADM

## 2024-06-06 RX ORDER — SODIUM CHLORIDE 9 MG/ML
INJECTION, SOLUTION INTRAVENOUS PRN
Status: DISCONTINUED | OUTPATIENT
Start: 2024-06-06 | End: 2024-06-06 | Stop reason: HOSPADM

## 2024-06-06 RX ORDER — SENNA AND DOCUSATE SODIUM 50; 8.6 MG/1; MG/1
1 TABLET, FILM COATED ORAL 2 TIMES DAILY
Qty: 60 TABLET | Refills: 0 | Status: SHIPPED | OUTPATIENT
Start: 2024-06-06

## 2024-06-06 RX ORDER — IODINE SOLUTION STRONG 5% (LUGOL'S) 5 %
SOLUTION ORAL
Status: DISCONTINUED
Start: 2024-06-06 | End: 2024-06-06 | Stop reason: HOSPADM

## 2024-06-06 RX ORDER — METHYLERGONOVINE MALEATE 0.2 MG/ML
INJECTION INTRAVENOUS
Status: DISCONTINUED
Start: 2024-06-06 | End: 2024-06-06 | Stop reason: HOSPADM

## 2024-06-06 RX ORDER — LABETALOL HYDROCHLORIDE 5 MG/ML
10 INJECTION, SOLUTION INTRAVENOUS
Status: DISCONTINUED | OUTPATIENT
Start: 2024-06-06 | End: 2024-06-06 | Stop reason: HOSPADM

## 2024-06-06 RX ORDER — SODIUM CHLORIDE 0.9 % (FLUSH) 0.9 %
5-40 SYRINGE (ML) INJECTION EVERY 12 HOURS SCHEDULED
Status: DISCONTINUED | OUTPATIENT
Start: 2024-06-06 | End: 2024-06-06 | Stop reason: HOSPADM

## 2024-06-06 RX ORDER — HYDRALAZINE HYDROCHLORIDE 20 MG/ML
10 INJECTION INTRAMUSCULAR; INTRAVENOUS
Status: DISCONTINUED | OUTPATIENT
Start: 2024-06-06 | End: 2024-06-06 | Stop reason: HOSPADM

## 2024-06-06 RX ORDER — KETOROLAC TROMETHAMINE 30 MG/ML
INJECTION, SOLUTION INTRAMUSCULAR; INTRAVENOUS PRN
Status: DISCONTINUED | OUTPATIENT
Start: 2024-06-06 | End: 2024-06-06 | Stop reason: SDUPTHER

## 2024-06-06 RX ORDER — SODIUM CHLORIDE, SODIUM LACTATE, POTASSIUM CHLORIDE, CALCIUM CHLORIDE 600; 310; 30; 20 MG/100ML; MG/100ML; MG/100ML; MG/100ML
INJECTION, SOLUTION INTRAVENOUS CONTINUOUS
Status: DISCONTINUED | OUTPATIENT
Start: 2024-06-06 | End: 2024-06-06 | Stop reason: HOSPADM

## 2024-06-06 RX ORDER — IBUPROFEN 600 MG/1
600 TABLET ORAL EVERY 6 HOURS PRN
Qty: 40 TABLET | Refills: 1 | Status: SHIPPED | OUTPATIENT
Start: 2024-06-06

## 2024-06-06 RX ORDER — PROPOFOL 10 MG/ML
INJECTION, EMULSION INTRAVENOUS PRN
Status: DISCONTINUED | OUTPATIENT
Start: 2024-06-06 | End: 2024-06-06 | Stop reason: SDUPTHER

## 2024-06-06 RX ORDER — LIDOCAINE HYDROCHLORIDE 10 MG/ML
INJECTION, SOLUTION INFILTRATION; PERINEURAL PRN
Status: DISCONTINUED | OUTPATIENT
Start: 2024-06-06 | End: 2024-06-06 | Stop reason: SDUPTHER

## 2024-06-06 RX ORDER — NALOXONE HYDROCHLORIDE 0.4 MG/ML
INJECTION, SOLUTION INTRAMUSCULAR; INTRAVENOUS; SUBCUTANEOUS PRN
Status: DISCONTINUED | OUTPATIENT
Start: 2024-06-06 | End: 2024-06-06 | Stop reason: HOSPADM

## 2024-06-06 RX ORDER — SODIUM CHLORIDE 0.9 % (FLUSH) 0.9 %
5-40 SYRINGE (ML) INJECTION PRN
Status: DISCONTINUED | OUTPATIENT
Start: 2024-06-06 | End: 2024-06-06 | Stop reason: HOSPADM

## 2024-06-06 RX ORDER — PROCHLORPERAZINE EDISYLATE 5 MG/ML
5 INJECTION INTRAMUSCULAR; INTRAVENOUS
Status: DISCONTINUED | OUTPATIENT
Start: 2024-06-06 | End: 2024-06-06 | Stop reason: HOSPADM

## 2024-06-06 RX ORDER — DEXAMETHASONE SODIUM PHOSPHATE 10 MG/ML
INJECTION, SOLUTION INTRAMUSCULAR; INTRAVENOUS PRN
Status: DISCONTINUED | OUTPATIENT
Start: 2024-06-06 | End: 2024-06-06 | Stop reason: SDUPTHER

## 2024-06-06 RX ORDER — ONDANSETRON 2 MG/ML
INJECTION INTRAMUSCULAR; INTRAVENOUS PRN
Status: DISCONTINUED | OUTPATIENT
Start: 2024-06-06 | End: 2024-06-06 | Stop reason: SDUPTHER

## 2024-06-06 RX ORDER — FENTANYL CITRATE 50 UG/ML
INJECTION, SOLUTION INTRAMUSCULAR; INTRAVENOUS PRN
Status: DISCONTINUED | OUTPATIENT
Start: 2024-06-06 | End: 2024-06-06 | Stop reason: SDUPTHER

## 2024-06-06 RX ADMIN — FENTANYL CITRATE 25 MCG: 50 INJECTION, SOLUTION INTRAMUSCULAR; INTRAVENOUS at 08:11

## 2024-06-06 RX ADMIN — KETOROLAC TROMETHAMINE 30 MG: 30 INJECTION, SOLUTION INTRAMUSCULAR; INTRAVENOUS at 08:06

## 2024-06-06 RX ADMIN — ONDANSETRON 4 MG: 2 INJECTION INTRAMUSCULAR; INTRAVENOUS at 08:07

## 2024-06-06 RX ADMIN — LIDOCAINE HYDROCHLORIDE 50 MG: 10 INJECTION, SOLUTION INFILTRATION; PERINEURAL at 07:45

## 2024-06-06 RX ADMIN — PROPOFOL 150 MG: 10 INJECTION, EMULSION INTRAVENOUS at 07:45

## 2024-06-06 RX ADMIN — FENTANYL CITRATE 50 MCG: 50 INJECTION, SOLUTION INTRAMUSCULAR; INTRAVENOUS at 07:46

## 2024-06-06 RX ADMIN — FENTANYL CITRATE 25 MCG: 50 INJECTION, SOLUTION INTRAMUSCULAR; INTRAVENOUS at 08:09

## 2024-06-06 RX ADMIN — SODIUM CHLORIDE, POTASSIUM CHLORIDE, SODIUM LACTATE AND CALCIUM CHLORIDE: 600; 310; 30; 20 INJECTION, SOLUTION INTRAVENOUS at 07:09

## 2024-06-06 RX ADMIN — DEXAMETHASONE SODIUM PHOSPHATE 10 MG: 10 INJECTION INTRAMUSCULAR; INTRAVENOUS at 07:59

## 2024-06-06 ASSESSMENT — PAIN SCALES - GENERAL
PAINLEVEL_OUTOF10: 0

## 2024-06-06 ASSESSMENT — PAIN - FUNCTIONAL ASSESSMENT: PAIN_FUNCTIONAL_ASSESSMENT: 0-10

## 2024-06-06 ASSESSMENT — PAIN DESCRIPTION - DESCRIPTORS: DESCRIPTORS: ACHING

## 2024-06-06 NOTE — ANESTHESIA POSTPROCEDURE EVALUATION
Department of Anesthesiology  Postprocedure Note    Patient: iVn Escalera  MRN: [id number]  YOB: 1980  Date of evaluation: 6/6/2024    Procedure Summary       Date: 06/06/24 Room / Location: [address]    Anesthesia Start: 0742 Anesthesia Stop: 0823    Procedure: DILATATION AND CURETTAGE HYSTEROSCOPY Diagnosis:       Abnormal uterine bleeding (AUB)      History of endometrial ablation      (Abnormal uterine bleeding (AUB) [N93.9])      (History of endometrial ablation [Z98.890])    Surgeons: Albina Drake DO Responsible Provider: Chayito James MD    Anesthesia Type: general ASA Status: 2            Anesthesia Type: No value filed.    Mary Phase I: Mary Score: 7    Mary Phase II:      Anesthesia Post Evaluation    Patient location during evaluation: PACU  Patient participation: complete - patient participated  Level of consciousness: awake and awake and alert  Pain score: 1  Nausea & Vomiting: no nausea and no vomiting  Cardiovascular status: hemodynamically stable and blood pressure returned to baseline  Respiratory status: acceptable  Hydration status: euvolemic  Multimodal analgesia pain management approach  Pain management: adequate    No notable events documented.

## 2024-06-06 NOTE — ANESTHESIA PRE PROCEDURE
Department of Anesthesiology  Preprocedure Note       Name:  Vin Escalera   Age:  44 y.o.  :  1980                                          MRN:  [id number]         Date:  2024      Surgeon: Surgeon(s):  Albina Drake DO    Procedure: Procedure(s):  DILATATION AND CURETTAGE HYSTEROSCOPY MYOSURE    Medications prior to admission:   Prior to Admission medications    Medication Sig Start Date End Date Taking? Authorizing Provider   ibuprofen (ADVIL;MOTRIN) 600 MG tablet Take 1 tablet by mouth every 6 hours as needed for Pain 24  Yes Schuyler Tanner MD   sennosides-docusate sodium (SENOKOT-S) 8.6-50 MG tablet Take 1 tablet by mouth in the morning and at bedtime 24  Yes Schuyler Tanner MD   acetaminophen (TYLENOL) 500 MG tablet Take 2 tablets by mouth every 6 hours as needed for Pain 24  Yes Schuyler Tanner MD   vitamin D (ERGOCALCIFEROL) 1.25 MG (18496 UT) CAPS capsule Take 1 capsule by mouth once a week 24   Rima Porter APRN - CNP   norethindrone (AYGESTIN) 5 MG tablet Take 1 tablet by mouth daily 24   Albina Drake DO   ferrous sulfate (FE TABS) 325 (65 Fe) MG EC tablet Take 1 tablet by mouth 2 times daily 24   Rima Porter APRN - CNP   pantoprazole (PROTONIX) 20 MG tablet TAKE 1 TABLET BY MOUTH ONCE DAILY IN THE MORNING BEFORE BREAKFAST 23   Meek Meek PA-C   fluticasone (FLONASE) 50 MCG/ACT nasal spray 2 sprays by Each Nostril route daily 21   Rima Porter APRN - CNP   loratadine (CLARITIN) 10 MG tablet Take 1 tablet by mouth daily 21   Rima Porter APRN - CNP       Current medications:    Current Facility-Administered Medications   Medication Dose Route Frequency Provider Last Rate Last Admin    lidocaine PF 1 % injection 1 mL  1 mL IntraDERmal Once PRN Pierce Wong MD        lactated ringers IV soln infusion   IntraVENous Continuous Pierce Wong  mL/hr at 24 0709 New Bag at 24 0709

## 2024-06-06 NOTE — OP NOTE
Operative Note  Department of Obstetrics and Gynecology  [facility]       Patient: Vin Escalera   : 1980  MRN: [id number]       Acct: [id number]   PCP: Rima Porter APRN - CNP  Date of Procedure: 24    Pre-operative Diagnosis: 44 y.o. female    Abnormal Uterine Bleeding   History of Endometrial Ablation   Thrombocytopenia  Irritable Bowel Syndrome   Vertigo   BMI 18    Post-operative Diagnosis:   Abnormal Uterine Bleeding   History of Endometrial Ablation   Thrombocytopenia  Irritable Bowel Syndrome   Vertigo   BMI 18    Procedure: Hysteroscopy, Dilation and Curettage    Surgeon: Dr. Drake  Assistants: Schuyler Tanner MD, PGY2    Anesthesia: general    Indications: Vin Escalera 44 y.o. female  presents for surgical evaluation and management of Abnormal Uterine Bleeding (AUB). Ultrasound in 2024 revealed a uterus measuring 8.4x6.2x4.7 cm with endometrial stripe of 1.1 cm and findings suggestive of adenomyosis. Pap smear on 3/2024 was NILM. The patient has a history of endometrial ablation in . The patient would like to proceed to the operating room for surgical management at this time.     Procedure Details:   The patient was seen in the pre-op room. The risks, benefits, complications, treatment options, and expected outcomes were discussed with the patient. The patient concurred with the proposed plan, giving informed consent. The patient was taken to the Operating Room and identified as Vin Escalera and the procedure was verified. A Time Out was held and the above information confirmed.     After administration of general anesthesia, the patient was placed in the dorsolithotomy position with yellow-fin stirrups and examination under general anesthesia performed with findings as noted below. The patient was prepped and draped in the usual sterile fashion. The bladder was drained with a straight catheter, and a weighted speculum was placed into the

## 2024-06-10 ENCOUNTER — TELEPHONE (OUTPATIENT)
Dept: OBGYN CLINIC | Age: 44
End: 2024-06-10

## 2024-06-10 NOTE — TELEPHONE ENCOUNTER
Still having bleeding from her surgery on 6/6 and is wondering if she can take the Aygestin?    She had D&C

## 2024-06-11 LAB — SURGICAL PATHOLOGY REPORT: NORMAL

## 2024-07-17 ENCOUNTER — HOSPITAL ENCOUNTER (OUTPATIENT)
Age: 44
Setting detail: SPECIMEN
Discharge: HOME | End: 2024-07-17

## 2024-07-17 ENCOUNTER — OFFICE VISIT (OUTPATIENT)
Dept: OBGYN CLINIC | Age: 44
End: 2024-07-17
Payer: COMMERCIAL

## 2024-07-17 VITALS — SYSTOLIC BLOOD PRESSURE: 110 MMHG | DIASTOLIC BLOOD PRESSURE: 60 MMHG

## 2024-07-17 DIAGNOSIS — N93.9 ABNORMAL UTERINE BLEEDING (AUB): Primary | ICD-10-CM

## 2024-07-17 DIAGNOSIS — R53.83 OTHER FATIGUE: ICD-10-CM

## 2024-07-17 DIAGNOSIS — Z98.890 HISTORY OF HYSTEROSCOPY: ICD-10-CM

## 2024-07-17 DIAGNOSIS — Z98.890 STATUS POST HYSTEROSCOPY: ICD-10-CM

## 2024-07-17 DIAGNOSIS — N93.9 ABNORMAL UTERINE BLEEDING (AUB): ICD-10-CM

## 2024-07-17 LAB
ALBUMIN SERPL-MCNC: 4.6 G/DL (ref 3.5–5.2)
ALBUMIN/GLOB SERPL: 2 {RATIO} (ref 1–2.5)
ALP SERPL-CCNC: 49 U/L (ref 35–104)
ALT SERPL-CCNC: 10 U/L (ref 10–35)
ANION GAP SERPL CALCULATED.3IONS-SCNC: 10 MMOL/L (ref 9–16)
AST SERPL-CCNC: 18 U/L (ref 10–35)
BILIRUB SERPL-MCNC: 0.3 MG/DL (ref 0–1.2)
BUN SERPL-MCNC: 9 MG/DL (ref 6–20)
CALCIUM SERPL-MCNC: 9.6 MG/DL (ref 8.6–10.4)
CHLORIDE SERPL-SCNC: 106 MMOL/L (ref 98–107)
CO2 SERPL-SCNC: 24 MMOL/L (ref 20–31)
CREAT SERPL-MCNC: 0.8 MG/DL (ref 0.5–0.9)
ERYTHROCYTE [DISTWIDTH] IN BLOOD BY AUTOMATED COUNT: 14.2 % (ref 11.8–14.4)
GFR, ESTIMATED: >90 ML/MIN/1.73M2
GLUCOSE SERPL-MCNC: 76 MG/DL (ref 74–99)
HCT VFR BLD AUTO: 36.8 % (ref 36.3–47.1)
HGB BLD-MCNC: 11.2 G/DL (ref 11.9–15.1)
INR PPP: 1.1
MCH RBC QN AUTO: 26.9 PG (ref 25.2–33.5)
MCHC RBC AUTO-ENTMCNC: 30.4 G/DL (ref 28.4–34.8)
MCV RBC AUTO: 88.2 FL (ref 82.6–102.9)
NRBC BLD-RTO: 0 PER 100 WBC
PARTIAL THROMBOPLASTIN TIME: 28.1 SEC (ref 23–36.5)
PLATELET # BLD AUTO: 192 K/UL (ref 138–453)
PMV BLD AUTO: 12.5 FL (ref 8.1–13.5)
POTASSIUM SERPL-SCNC: 5 MMOL/L (ref 3.7–5.3)
PROT SERPL-MCNC: 6.9 G/DL (ref 6.6–8.7)
PROTHROMBIN TIME: 13.8 SEC (ref 11.7–14.9)
RBC # BLD AUTO: 4.17 M/UL (ref 3.95–5.11)
SODIUM SERPL-SCNC: 140 MMOL/L (ref 136–145)
TSH SERPL DL<=0.05 MIU/L-ACNC: 0.78 UIU/ML (ref 0.27–4.2)
WBC OTHER # BLD: 6.7 K/UL (ref 3.5–11.3)

## 2024-07-17 PROCEDURE — 99214 OFFICE O/P EST MOD 30 MIN: CPT | Performed by: STUDENT IN AN ORGANIZED HEALTH CARE EDUCATION/TRAINING PROGRAM

## 2024-07-17 RX ORDER — SENNOSIDES 8.6 MG
1 TABLET ORAL DAILY
Qty: 120 TABLET | Refills: 0 | Status: SHIPPED | OUTPATIENT
Start: 2024-07-17

## 2024-07-30 ENCOUNTER — OFFICE VISIT (OUTPATIENT)
Dept: OBGYN CLINIC | Age: 44
End: 2024-07-30
Payer: COMMERCIAL

## 2024-07-30 VITALS — SYSTOLIC BLOOD PRESSURE: 110 MMHG | BODY MASS INDEX: 19.57 KG/M2 | DIASTOLIC BLOOD PRESSURE: 62 MMHG | WEIGHT: 107 LBS

## 2024-07-30 DIAGNOSIS — Z98.890 HISTORY OF HYSTEROSCOPY: ICD-10-CM

## 2024-07-30 DIAGNOSIS — R68.89 THIN BUILD: ICD-10-CM

## 2024-07-30 DIAGNOSIS — N93.9 ABNORMAL UTERINE BLEEDING: Primary | ICD-10-CM

## 2024-07-30 DIAGNOSIS — Z98.890 HISTORY OF ENDOMETRIAL ABLATION: ICD-10-CM

## 2024-07-30 PROCEDURE — 99213 OFFICE O/P EST LOW 20 MIN: CPT | Performed by: STUDENT IN AN ORGANIZED HEALTH CARE EDUCATION/TRAINING PROGRAM

## 2024-07-30 NOTE — PROGRESS NOTES
contact    ________________________________________________________________________      OBSTETRICAL HISTORY:  OB History    Para Term  AB Living   4 2 2 0 1 3   SAB IAB Ectopic Molar Multiple Live Births   1 0 0   0 3      # Outcome Date GA Lbr Kasi/2nd Weight Sex Delivery Anes PTL Lv   4  10/08/12 39w0d  2.835 kg (6 lb 4 oz) M CS-Unspec   GISSELL   3 Term 2008     CS-Unspec   GISSELL   2 Term 2004     CS-Unspec   GISSELL   1 SAB                PAST MEDICAL HISTORY:      Diagnosis Date    Dorsal back pain 2013    DUB (dysfunctional uterine bleeding) 2013    Menorrhagia 10/20/2014    Other specified fetal and placental problems affecting management of mother, antepartum 2012    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HCC)     Suspected placental problem not found 2012    Thrombocytopenia (HCC)     Uterine leiomyoma 2018    Vitamin D deficiency        PAST SURGICAL HISTORY:                                                                    Procedure Laterality Date     SECTION, LOW TRANSVERSE  2004     SECTION, LOW TRANSVERSE  2008     SECTION, LOW TRANSVERSE  10/2012    COLONOSCOPY  2022    COLONOSCOPY N/A 2022    COLONOSCOPY WITH BIOPSY performed by Miguel Soto MD at [facility] OR    DILATION AND CURETTAGE OF UTERUS N/A 2020    DILATATION AND CURETTAGE HYSTEROSCOPY, NOVASURE performed by Lee Be MD at [facility] OR    DILATION AND CURETTAGE OF UTERUS N/A 2024    DILATATION AND CURETTAGE HYSTEROSCOPY performed by Albina Drake DO at [facility] OR    TUBAL LIGATION  10/08/2012    UPPER GASTROINTESTINAL ENDOSCOPY  2022    UPPER GASTROINTESTINAL ENDOSCOPY N/A 2022    EGD BIOPSY performed by Miguel Soto MD at [facility] OR       MEDICATIONS:  Current Outpatient Medications   Medication Sig Dispense Refill    senna (SENOKOT) 8.6 MG TABS tablet Take 1 tablet by mouth daily 120 tablet 0    ibuprofen (ADVIL;MOTRIN) 600 MG

## 2024-08-06 ENCOUNTER — TELEPHONE (OUTPATIENT)
Dept: OBGYN CLINIC | Age: 44
End: 2024-08-06

## 2024-08-06 DIAGNOSIS — N93.9 ABNORMAL UTERINE BLEEDING (AUB): Primary | ICD-10-CM

## 2024-08-06 RX ORDER — NORETHINDRONE ACETATE AND ETHINYL ESTRADIOL .03; 1.5 MG/1; MG/1
1 TABLET ORAL DAILY
Qty: 1 PACKET | Refills: 11 | Status: SHIPPED | OUTPATIENT
Start: 2024-08-06

## 2024-08-06 NOTE — TELEPHONE ENCOUNTER
Pt called stating she is bleeding through 3/4 pads a day and has been bleeding this heavy for 5 days.

## 2024-08-07 NOTE — TELEPHONE ENCOUNTER
Recommend estrogen+progesterone contraceptive as discussed at previous visit. Rx sent to pharmacy. Start the medication when she picks it up from the pharmacy. Take once daily at the same hour/time each day.   Dr. Drake

## 2024-10-20 RX ORDER — ERGOCALCIFEROL 1.25 MG/1
50000 CAPSULE, LIQUID FILLED ORAL WEEKLY
Qty: 12 CAPSULE | Refills: 1 | Status: SHIPPED | OUTPATIENT
Start: 2024-10-20

## 2024-11-07 DIAGNOSIS — N93.9 ABNORMAL UTERINE BLEEDING (AUB): ICD-10-CM

## 2024-11-07 RX ORDER — LOPERAMIDE HYDROCHLORIDE 2 MG/1
2 CAPSULE ORAL 4 TIMES DAILY PRN
Qty: 30 CAPSULE | Refills: 0 | Status: SHIPPED | OUTPATIENT
Start: 2024-11-07 | End: 2024-11-17

## 2024-11-07 RX ORDER — NORETHINDRONE 5 MG/1
5 TABLET ORAL DAILY
Qty: 30 TABLET | Refills: 0 | Status: SHIPPED | OUTPATIENT
Start: 2024-11-07

## 2024-12-05 DIAGNOSIS — N93.9 ABNORMAL UTERINE BLEEDING (AUB): ICD-10-CM

## 2024-12-05 RX ORDER — NORETHINDRONE 5 MG/1
5 TABLET ORAL DAILY
Qty: 30 TABLET | Refills: 5 | Status: SHIPPED | OUTPATIENT
Start: 2024-12-05

## 2025-02-18 ENCOUNTER — OFFICE VISIT (OUTPATIENT)
Dept: FAMILY MEDICINE CLINIC | Age: 45
End: 2025-02-18

## 2025-02-18 VITALS
HEART RATE: 101 BPM | OXYGEN SATURATION: 98 % | SYSTOLIC BLOOD PRESSURE: 110 MMHG | WEIGHT: 106 LBS | RESPIRATION RATE: 14 BRPM | TEMPERATURE: 100.8 F | BODY MASS INDEX: 19.39 KG/M2 | DIASTOLIC BLOOD PRESSURE: 76 MMHG

## 2025-02-18 DIAGNOSIS — R52 GENERALIZED BODY ACHES: ICD-10-CM

## 2025-02-18 DIAGNOSIS — R50.9 FEVER, UNSPECIFIED FEVER CAUSE: ICD-10-CM

## 2025-02-18 DIAGNOSIS — J10.1 INFLUENZA A: Primary | ICD-10-CM

## 2025-02-18 LAB
INFLUENZA A ANTIGEN, POC: POSITIVE
INFLUENZA B ANTIGEN, POC: NEGATIVE
Lab: NORMAL
QC PASS/FAIL: NORMAL
SARS-COV-2 RDRP RESP QL NAA+PROBE: NEGATIVE
VALID INTERNAL CONTROL, POC: ABNORMAL

## 2025-02-18 RX ORDER — OSELTAMIVIR PHOSPHATE 75 MG/1
75 CAPSULE ORAL 2 TIMES DAILY
Qty: 10 CAPSULE | Refills: 0 | Status: SHIPPED | OUTPATIENT
Start: 2025-02-18 | End: 2025-02-23

## 2025-02-18 ASSESSMENT — PATIENT HEALTH QUESTIONNAIRE - PHQ9
2. FEELING DOWN, DEPRESSED OR HOPELESS: SEVERAL DAYS
1. LITTLE INTEREST OR PLEASURE IN DOING THINGS: NOT AT ALL
SUM OF ALL RESPONSES TO PHQ QUESTIONS 1-9: 1
SUM OF ALL RESPONSES TO PHQ9 QUESTIONS 1 & 2: 1
SUM OF ALL RESPONSES TO PHQ QUESTIONS 1-9: 1

## 2025-02-18 NOTE — PROGRESS NOTES
[facility]  [address]  [address]  [place] [address]  Dept: [phone]  Dept Fax: [phone]    Vin Escalera is a 44 y.o. female who presents today for her medical conditions/complaints of   Chief Complaint   Patient presents with    Cough     X 1 day    Fever    Vomiting     Only in AM           HPI:     /76   Pulse (!) 101   Temp (!) 100.8 °F (38.2 °C)   Resp 14   Wt 48.1 kg (106 lb)   SpO2 98%   BMI 19.39 kg/m²       HPI  Pt presented to the clinic today with c/o fever and chills. This is a new problem. The current episode started 1 days ago. Associated symptoms include: vomiting in the AM only, cough, runny nose, body aches, headache.  Pertinent negatives include: No SOB, chest pain, abdominal pain .  Pt has tried Tylenol with little improvement.       Past Medical History:   Diagnosis Date    Dorsal back pain 2013    DUB (dysfunctional uterine bleeding) 2013    Menorrhagia 10/20/2014    Other specified fetal and placental problems affecting management of mother, antepartum 2012    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HCC)     Suspected placental problem not found 2012    Thrombocytopenia (HCC)     Uterine leiomyoma 2018    Vitamin D deficiency         Past Surgical History:   Procedure Laterality Date     SECTION, LOW TRANSVERSE  2004     SECTION, LOW TRANSVERSE  2008     SECTION, LOW TRANSVERSE  10/2012    COLONOSCOPY  2022    COLONOSCOPY N/A 2022    COLONOSCOPY WITH BIOPSY performed by Miguel Soto MD at [facility] OR    DILATION AND CURETTAGE OF UTERUS N/A 2020    DILATATION AND CURETTAGE HYSTEROSCOPY, NOVASURE performed by Lee Be MD at [facility] OR    DILATION AND CURETTAGE OF UTERUS N/A 2024    DILATATION AND CURETTAGE HYSTEROSCOPY performed by Albina Drake DO at [facility] OR    TUBAL LIGATION  10/08/2012    UPPER

## 2025-02-26 ENCOUNTER — TELEPHONE (OUTPATIENT)
Dept: PRIMARY CARE CLINIC | Age: 45
End: 2025-02-26

## 2025-02-26 NOTE — TELEPHONE ENCOUNTER
Patient called into office stating she has an ongoing diarrhea and upset stomach x 8 days. She stated it started after she began taking Tamiflu on 2/18 and won't stop even after she was done with the medication. She said she is unable to eat anything because she uses the restroom immediately     Please advise  Thanks    none pain:/pt. has foot and buttocks pain none none

## 2025-05-30 RX ORDER — ERGOCALCIFEROL 1.25 MG/1
50000 CAPSULE, LIQUID FILLED ORAL WEEKLY
Qty: 12 CAPSULE | Refills: 1 | Status: SHIPPED | OUTPATIENT
Start: 2025-05-30

## 2025-08-06 ENCOUNTER — OFFICE VISIT (OUTPATIENT)
Dept: FAMILY MEDICINE CLINIC | Age: 45
End: 2025-08-06
Payer: COMMERCIAL

## 2025-08-06 VITALS
DIASTOLIC BLOOD PRESSURE: 64 MMHG | SYSTOLIC BLOOD PRESSURE: 98 MMHG | BODY MASS INDEX: 19.5 KG/M2 | OXYGEN SATURATION: 99 % | RESPIRATION RATE: 14 BRPM | TEMPERATURE: 97.2 F | HEART RATE: 84 BPM | WEIGHT: 106.6 LBS

## 2025-08-06 DIAGNOSIS — M75.21 BICEPS TENDONITIS ON RIGHT: Primary | ICD-10-CM

## 2025-08-06 PROCEDURE — 99213 OFFICE O/P EST LOW 20 MIN: CPT | Performed by: NURSE PRACTITIONER

## 2025-08-06 RX ORDER — IBUPROFEN 800 MG/1
800 TABLET, FILM COATED ORAL EVERY 8 HOURS PRN
Qty: 30 TABLET | Refills: 0 | Status: SHIPPED | OUTPATIENT
Start: 2025-08-06

## 2025-08-06 SDOH — ECONOMIC STABILITY: FOOD INSECURITY: WITHIN THE PAST 12 MONTHS, THE FOOD YOU BOUGHT JUST DIDN'T LAST AND YOU DIDN'T HAVE MONEY TO GET MORE.: NEVER TRUE

## 2025-08-06 SDOH — ECONOMIC STABILITY: FOOD INSECURITY: WITHIN THE PAST 12 MONTHS, YOU WORRIED THAT YOUR FOOD WOULD RUN OUT BEFORE YOU GOT MONEY TO BUY MORE.: NEVER TRUE

## 2025-08-06 ASSESSMENT — PATIENT HEALTH QUESTIONNAIRE - PHQ9
2. FEELING DOWN, DEPRESSED OR HOPELESS: NOT AT ALL
SUM OF ALL RESPONSES TO PHQ QUESTIONS 1-9: 0
1. LITTLE INTEREST OR PLEASURE IN DOING THINGS: NOT AT ALL

## (undated) DEVICE — BITEBLOCK 54FR W/ DENT RIM BLOX

## (undated) DEVICE — PERRYSBURG ENDO PACK: Brand: MEDLINE INDUSTRIES, INC.

## (undated) DEVICE — SOLUTION IRRIG 1000ML 0.9% SOD CHL USP POUR PLAS BTL

## (undated) DEVICE — SVMMC GYN MIN PK

## (undated) DEVICE — SET ENDOSCP SEAL HYSTEROSCOPE RIG OUTFLO CHN DISP MYOSURE

## (undated) DEVICE — SOLUTION PREP POVIDONE IOD FOR SKIN MUCOUS MEM PRIOR TO

## (undated) DEVICE — FORCEPS BX L240CM JAW DIA2.8MM L CAP W/ NDL MIC MESH TOOTH

## (undated) DEVICE — MHPB GYN MINOR PACK: Brand: MEDLINE INDUSTRIES, INC.

## (undated) DEVICE — GARMENT,MEDLINE,DVT,INT,CALF,MED, GEN2: Brand: MEDLINE

## (undated) DEVICE — SOLUTION IRRIG 3000ML 0.9% SOD CHL USP UROMATIC PLAS CONT

## (undated) DEVICE — UNDERPANTS MAT L XL SEAMLESS CLR CODE WAISTBAND KNIT

## (undated) DEVICE — GLOVE SURG SZ 65 THK91MIL LTX FREE SYN POLYISOPRENE

## (undated) DEVICE — YANKAUER,BULB TIP,W/O VENT,RIGID,STERILE: Brand: MEDLINE

## (undated) DEVICE — STRAP,POSITIONING,KNEE/BODY,FOAM,4X60": Brand: MEDLINE

## (undated) DEVICE — GLOVE ORANGE PI 7 1/2   MSG9075

## (undated) DEVICE — KIT THERMOABLATION 6MM ENDOMET DEV NOVASURE

## (undated) DEVICE — SOLUTION SCRB 4OZ 10% POVIDONE IOD ANTIMIC BTL

## (undated) DEVICE — FLUID MGMT SYS FLUENT KIT 6/PK

## (undated) DEVICE — 4-PORT MANIFOLD: Brand: NEPTUNE 2

## (undated) DEVICE — GLOVE ORANGE PI 7   MSG9070

## (undated) DEVICE — PAD,SANITARY,11 IN,MAXI,W/WINGS,N-STRL: Brand: MEDLINE

## (undated) DEVICE — 1016 S-DRAPE IRRIG POUCH 10/BOX: Brand: STERI-DRAPE™

## (undated) DEVICE — SHEET DRAPE FULL 70X100

## (undated) DEVICE — TUBING, SUCTION, 9/32" X 20', STRAIGHT: Brand: MEDLINE INDUSTRIES, INC.